# Patient Record
Sex: MALE | Race: WHITE | Employment: OTHER | URBAN - METROPOLITAN AREA
[De-identification: names, ages, dates, MRNs, and addresses within clinical notes are randomized per-mention and may not be internally consistent; named-entity substitution may affect disease eponyms.]

---

## 2017-04-17 PROBLEM — J45.20 MILD INTERMITTENT ASTHMA WITHOUT COMPLICATION: Status: ACTIVE | Noted: 2017-04-17

## 2018-05-04 ENCOUNTER — OFFICE VISIT (OUTPATIENT)
Dept: PRIMARY CARE CLINIC | Age: 73
End: 2018-05-04
Payer: COMMERCIAL

## 2018-05-04 VITALS
BODY MASS INDEX: 27 KG/M2 | HEIGHT: 67 IN | TEMPERATURE: 97.9 F | WEIGHT: 172 LBS | HEART RATE: 64 BPM | OXYGEN SATURATION: 95 % | DIASTOLIC BLOOD PRESSURE: 72 MMHG | SYSTOLIC BLOOD PRESSURE: 138 MMHG

## 2018-05-04 DIAGNOSIS — F41.8 DEPRESSION WITH ANXIETY: ICD-10-CM

## 2018-05-04 DIAGNOSIS — Z00.00 ROUTINE GENERAL MEDICAL EXAMINATION AT A HEALTH CARE FACILITY: Primary | ICD-10-CM

## 2018-05-04 PROCEDURE — G0438 PPPS, INITIAL VISIT: HCPCS | Performed by: FAMILY MEDICINE

## 2018-05-04 RX ORDER — CLONAZEPAM 0.5 MG/1
0.5 TABLET ORAL 3 TIMES DAILY PRN
Qty: 90 TABLET | Refills: 2 | Status: SHIPPED | OUTPATIENT
Start: 2018-05-04 | End: 2018-08-15 | Stop reason: SDUPTHER

## 2018-05-04 ASSESSMENT — LIFESTYLE VARIABLES: HOW OFTEN DO YOU HAVE A DRINK CONTAINING ALCOHOL: 0

## 2018-05-04 ASSESSMENT — PATIENT HEALTH QUESTIONNAIRE - PHQ9
SUM OF ALL RESPONSES TO PHQ QUESTIONS 1-9: 0
SUM OF ALL RESPONSES TO PHQ QUESTIONS 1-9: 2

## 2018-05-04 ASSESSMENT — ANXIETY QUESTIONNAIRES
GAD7 TOTAL SCORE: 1
GAD7 TOTAL SCORE: 0

## 2018-08-15 ENCOUNTER — OFFICE VISIT (OUTPATIENT)
Dept: PRIMARY CARE CLINIC | Age: 73
End: 2018-08-15
Payer: COMMERCIAL

## 2018-08-15 VITALS
WEIGHT: 168 LBS | OXYGEN SATURATION: 98 % | SYSTOLIC BLOOD PRESSURE: 126 MMHG | TEMPERATURE: 98.2 F | HEART RATE: 56 BPM | BODY MASS INDEX: 26.31 KG/M2 | DIASTOLIC BLOOD PRESSURE: 82 MMHG

## 2018-08-15 DIAGNOSIS — F41.8 DEPRESSION WITH ANXIETY: ICD-10-CM

## 2018-08-15 PROCEDURE — 99213 OFFICE O/P EST LOW 20 MIN: CPT | Performed by: FAMILY MEDICINE

## 2018-08-15 RX ORDER — CLONAZEPAM 0.5 MG/1
0.5 TABLET ORAL 3 TIMES DAILY PRN
Qty: 90 TABLET | Refills: 2 | Status: SHIPPED | OUTPATIENT
Start: 2018-08-15 | End: 2018-11-14 | Stop reason: SDUPTHER

## 2018-08-15 NOTE — PROGRESS NOTES
visit:    Depression with anxiety  -     clonazePAM (KLONOPIN) 0.5 MG tablet; Take 1 tablet by mouth 3 times daily as needed for Anxiety for up to 30 days. .      Controlled Substances Monitoring:     RX Monitoring 8/15/2018   Attestation The Prescription Monitoring Report for this patient was reviewed today. Documentation No signs of potential drug abuse or diversion identified. - continue current med and dose  - continue exercise  - [facility]. Return in about 3 months (around 11/15/2018) for or for acute problem.     Pavan Carney, DO

## 2018-11-14 ENCOUNTER — OFFICE VISIT (OUTPATIENT)
Dept: PRIMARY CARE CLINIC | Age: 73
End: 2018-11-14
Payer: COMMERCIAL

## 2018-11-14 VITALS
DIASTOLIC BLOOD PRESSURE: 78 MMHG | TEMPERATURE: 97.4 F | WEIGHT: 169 LBS | HEART RATE: 66 BPM | OXYGEN SATURATION: 95 % | BODY MASS INDEX: 26.47 KG/M2 | SYSTOLIC BLOOD PRESSURE: 122 MMHG

## 2018-11-14 DIAGNOSIS — F41.8 DEPRESSION WITH ANXIETY: ICD-10-CM

## 2018-11-14 PROCEDURE — 99213 OFFICE O/P EST LOW 20 MIN: CPT | Performed by: FAMILY MEDICINE

## 2018-11-14 RX ORDER — CLONAZEPAM 0.5 MG/1
0.5 TABLET ORAL 3 TIMES DAILY PRN
Qty: 90 TABLET | Refills: 2 | Status: SHIPPED | OUTPATIENT
Start: 2018-11-14 | End: 2019-02-11 | Stop reason: SDUPTHER

## 2018-11-14 ASSESSMENT — ENCOUNTER SYMPTOMS: CONSTIPATION: 1

## 2019-02-11 ENCOUNTER — OFFICE VISIT (OUTPATIENT)
Dept: PRIMARY CARE CLINIC | Age: 74
End: 2019-02-11
Payer: COMMERCIAL

## 2019-02-11 VITALS
TEMPERATURE: 96.9 F | OXYGEN SATURATION: 98 % | WEIGHT: 166 LBS | BODY MASS INDEX: 26 KG/M2 | HEART RATE: 67 BPM | DIASTOLIC BLOOD PRESSURE: 64 MMHG | SYSTOLIC BLOOD PRESSURE: 122 MMHG

## 2019-02-11 DIAGNOSIS — Z12.11 COLON CANCER SCREENING: Primary | ICD-10-CM

## 2019-02-11 DIAGNOSIS — F41.8 DEPRESSION WITH ANXIETY: ICD-10-CM

## 2019-02-11 PROCEDURE — 99213 OFFICE O/P EST LOW 20 MIN: CPT | Performed by: FAMILY MEDICINE

## 2019-02-11 RX ORDER — CLONAZEPAM 0.5 MG/1
0.5 TABLET ORAL 3 TIMES DAILY PRN
Qty: 90 TABLET | Refills: 2 | Status: SHIPPED | OUTPATIENT
Start: 2019-02-11 | End: 2019-08-19 | Stop reason: SDUPTHER

## 2019-02-11 ASSESSMENT — PATIENT HEALTH QUESTIONNAIRE - PHQ9
2. FEELING DOWN, DEPRESSED OR HOPELESS: 0
SUM OF ALL RESPONSES TO PHQ QUESTIONS 1-9: 0
1. LITTLE INTEREST OR PLEASURE IN DOING THINGS: 0
SUM OF ALL RESPONSES TO PHQ9 QUESTIONS 1 & 2: 0
SUM OF ALL RESPONSES TO PHQ QUESTIONS 1-9: 0

## 2019-02-11 ASSESSMENT — ENCOUNTER SYMPTOMS: BACK PAIN: 1

## 2019-03-25 DIAGNOSIS — Z12.11 COLON CANCER SCREENING: ICD-10-CM

## 2019-05-13 ENCOUNTER — OFFICE VISIT (OUTPATIENT)
Dept: PRIMARY CARE CLINIC | Age: 74
End: 2019-05-13
Payer: COMMERCIAL

## 2019-05-13 VITALS
TEMPERATURE: 98 F | HEIGHT: 67 IN | DIASTOLIC BLOOD PRESSURE: 76 MMHG | WEIGHT: 153 LBS | BODY MASS INDEX: 24.01 KG/M2 | OXYGEN SATURATION: 96 % | HEART RATE: 68 BPM | SYSTOLIC BLOOD PRESSURE: 118 MMHG

## 2019-05-13 DIAGNOSIS — F41.8 DEPRESSION WITH ANXIETY: ICD-10-CM

## 2019-05-13 DIAGNOSIS — Z00.00 ROUTINE GENERAL MEDICAL EXAMINATION AT A HEALTH CARE FACILITY: Primary | ICD-10-CM

## 2019-05-13 PROCEDURE — G0439 PPPS, SUBSEQ VISIT: HCPCS | Performed by: FAMILY MEDICINE

## 2019-05-13 RX ORDER — CLONAZEPAM 0.5 MG/1
0.5 TABLET ORAL 3 TIMES DAILY PRN
Qty: 90 TABLET | Refills: 2 | Status: SHIPPED | OUTPATIENT
Start: 2019-05-13 | End: 2019-10-14 | Stop reason: SDUPTHER

## 2019-05-13 RX ORDER — CLONAZEPAM 0.5 MG/1
0.5 TABLET ORAL 3 TIMES DAILY PRN
COMMUNITY
End: 2019-05-13 | Stop reason: SDUPTHER

## 2019-05-13 ASSESSMENT — PATIENT HEALTH QUESTIONNAIRE - PHQ9
SUM OF ALL RESPONSES TO PHQ QUESTIONS 1-9: 0
SUM OF ALL RESPONSES TO PHQ QUESTIONS 1-9: 0

## 2019-05-13 ASSESSMENT — ANXIETY QUESTIONNAIRES: GAD7 TOTAL SCORE: 0

## 2019-05-13 ASSESSMENT — ENCOUNTER SYMPTOMS
CONSTIPATION: 1
BLOOD IN STOOL: 0
DIARRHEA: 0

## 2019-05-13 ASSESSMENT — LIFESTYLE VARIABLES: HOW OFTEN DO YOU HAVE A DRINK CONTAINING ALCOHOL: 0

## 2019-05-13 NOTE — PATIENT INSTRUCTIONS
Patient Education        Advance Directives: Care Instructions  Your Care Instructions  An advance directive is a legal way to state your wishes at the end of your life. It tells your family and your doctor what to do if you can no longer say what you want. There are two main types of advance directives. You can change them any time that your wishes change. · A living will tells your family and your doctor your wishes about life support and other treatment. · A durable power of  for health care lets you name a person to make treatment decisions for you when you can't speak for yourself. This person is called a health care agent. If you do not have an advance directive, decisions about your medical care may be made by a doctor or a  who doesn't know you. It may help to think of an advance directive as a gift to the people who care for you. If you have one, they won't have to make tough decisions by themselves. Follow-up care is a key part of your treatment and safety. Be sure to make and go to all appointments, and call your doctor if you are having problems. It's also a good idea to know your test results and keep a list of the medicines you take. How can you care for yourself at home? · Discuss your wishes with your loved ones and your doctor. This way, there are no surprises. · Many states have a unique form. Or you might use a universal form that has been approved by many states. This kind of form can sometimes be completed and stored online. Your electronic copy will then be available wherever you have a connection to the Internet. In most cases, doctors will respect your wishes even if you have a form from a different state. · You don't need a  to do an advance directive. But you may want to get legal advice. · Think about these questions when you prepare an advance directive:  ? Who do you want to make decisions about your medical care if you are not able to?  Many people choose a family member or close friend. ? Do you know enough about life support methods that might be used? If not, talk to your doctor so you understand. ? What are you most afraid of that might happen? You might be afraid of having pain, losing your independence, or being kept alive by machines. ? Where would you prefer to die? Choices include your home, a hospital, or a nursing home. ? Would you like to have information about hospice care to support you and your family? ? Do you want to donate organs when you die? ? Do you want certain Cheondoism practices performed before you die? If so, put your wishes in the advance directive. · Read your advance directive every year, and make changes as needed. When should you call for help? Be sure to contact your doctor if you have any questions. Where can you learn more? Go to https://[email or web address] org and sign in to your [employer] account. Enter R264 in the Dialogic box to learn more about \"Advance Directives: Care Instructions. \"     If you do not have an account, please click on the \"Sign Up Now\" link. Current as of: April 18, 2018  Content Version: 12.0  © 4671-4414 Healthwise, [employer]. Care instructions adapted under license by [facility] ([facility]). If you have questions about a medical condition or this instruction, always ask your healthcare professional. [place] [address] any warranty or liability for your use of this information. Patient Education        Advance Directives: Care Instructions  Your Care Instructions  An advance directive is a legal way to state your wishes at the end of your life. It tells your family and your doctor what to do if you can no longer say what you want. There are two main types of advance directives. You can change them any time that your wishes change. · A living will tells your family and your doctor your wishes about life support and other treatment.   · A durable power of want to donate organs when you die? ? Do you want certain Temple practices performed before you die? If so, put your wishes in the advance directive. · Read your advance directive every year, and make changes as needed. When should you call for help? Be sure to contact your doctor if you have any questions. Where can you learn more? Go to [email or web address] and sign in to your [employer] account. Enter R264 in the ZOOM TV box to learn more about \"Advance Directives: Care Instructions. \"     If you do not have an account, please click on the \"Sign Up Now\" link. Current as of: April 18, 2018  Content Version: 12.0  © 1081-3971 Healthwise, [employer]. Care instructions adapted under license by [facility] ([facility]). If you have questions about a medical condition or this instruction, always ask your healthcare professional. [address] any warranty or liability for your use of this information. Personalized Preventive Plan for Ruth Mueller - 5/13/2019  Medicare offers a range of preventive health benefits. Some of the tests and screenings are paid in full while other may be subject to a deductible, co-insurance, and/or copay. Some of these benefits include a comprehensive review of your medical history including lifestyle, illnesses that may run in your family, and various assessments and screenings as appropriate. After reviewing your medical record and screening and assessments performed today your provider may have ordered immunizations, labs, imaging, and/or referrals for you. A list of these orders (if applicable) as well as your Preventive Care list are included within your After Visit Summary for your review. Other Preventive Recommendations:    · A preventive eye exam performed by an eye specialist is recommended every 1-2 years to screen for glaucoma; cataracts, macular degeneration, and other eye disorders.   · A preventive

## 2019-05-13 NOTE — PROGRESS NOTES
Medicare Annual Wellness Visit subsequent  Name: Fanny Magana Date: 2019   MRN: <[id number]> Sex: Male   Age: 68 y.o. Ethnicity: Non-/Non    : 1945 Race: White      Perry Benz Record is here for [facility] for behavioral, psychosocial and functional/safety risks, and cognitive dysfunction are all negative except as indicated below. These results, as well as other patient data from the [address] form, are documented in Flowsheets linked to this Encounter. No Known Allergies  Prior to Visit Medications    Medication Sig Taking? Authorizing Provider   clonazePAM (KLONOPIN) 0.5 MG tablet Take 0.5 mg by mouth 3 times daily as needed. Yes Historical Provider, MD   fluticasone (FLONASE) 50 MCG/ACT nasal spray 2 sprays by Nasal route daily Yes Jude Carney DO   Misc Natural Products (PROSTATE SUPPORT PO) Take by mouth daily Yes Historical Provider, MD   SYMBICORT 160-4.5 MCG/ACT AERO 2 puffs as needed  Yes Historical Provider, MD   clonazePAM (KLONOPIN) 0.5 MG tablet Take 1 tablet by mouth 3 times daily as needed for Anxiety for up to 30 days. Jun Zarate DO     Past Medical History:   Diagnosis Date    Anxiety     Asthma     Bipolar disorder ([facility] [address]     Depression     Diverticulosis      Past Surgical History:   Procedure Laterality Date    CHOLECYSTECTOMY      COLONOSCOPY  2004    HERNIA REPAIR       No family history on file.     CareTeam (Including outside providers/suppliers regularly involved in providing care):   Patient Care Team:  Mateo Parikh DO as PCP - General  Mateo Parikh DO as PCP - S Attributed Provider    Wt Readings from Last 3 Encounters:   19 153 lb (69.4 kg)   19 166 lb (75.3 kg)   18 169 lb (76.7 kg)     Vitals:    19 0938   BP: 118/76   Pulse: 68   Temp: 98 °F (36.7 °C)   SpO2: 96%   Weight: 153 lb (69.4 kg)   Height: 5' 7\" (1.702 m) Body mass index is 23.96 kg/m². Based upon direct observation of the patient, evaluation of cognition reveals recent and remote memory intact. General Appearance: alert and oriented to person, place and time, well developed and well- nourished, in no acute distress  Skin: warm and dry, no rash or erythema  Head: normocephalic and atraumatic  Eyes: pupils equal, round, and reactive to light, extraocular eye movements intact, conjunctivae normal  ENT: tympanic membrane, external ear and ear canal normal bilaterally, nose without deformity, nasal mucosa and turbinates normal without polyps  Neck: supple and non-tender without mass, no thyromegaly or thyroid nodules, no cervical lymphadenopathy  Pulmonary/Chest: clear to auscultation bilaterally- no wheezes, rales or rhonchi, normal air movement, no respiratory distress  Cardiovascular: normal rate, regular rhythm, normal S1 and S2, no murmurs, rubs, clicks, or gallops, distal pulses intact, no carotid bruits  Abdomen: soft, non-tender, non-distended, normal bowel sounds, no masses or organomegaly  Extremities: no cyanosis, clubbing or edema  Musculoskeletal: normal range of motion, no joint swelling, deformity or tenderness  Neurologic: reflexes normal and symmetric, no cranial nerve deficit, gait, coordination and speech normal    Patient's complete Health Risk Assessment and screening values have been reviewed and are found in Flowsheets. The following problems were reviewed today and where indicated follow up appointments were made and/or referrals ordered. Positive Risk Factor Screenings with Interventions:     Health Habits/Nutrition:  Health Habits/Nutrition  Do you exercise for at least 20 minutes 2-3 times per week?: Yes  Have you lost any weight without trying in the past 3 months?: (!) Yes  Do you eat fewer than 2 meals per day?: (!) Yes  Have you seen a dentist within the past year?: Yes  Body mass index is 23.96 kg/m².   Health Habits/Nutrition Interventions:  · thinks wt loss is due to all his walking, which is 9 miles 3 times a wk. not eating meat and desserts. irecommend eating 3 meals. Safety:  Safety  Do you have working smoke detectors?: Yes  Have all throw rugs been removed or fastened?: (!) No(no throw rugs in house)  Do you have non-slip mats in all bathtubs?: Yes  Do all of your stairways have a railing or banister?: Yes  Are your doorways, halls and stairs free of clutter?: Yes  Do you always fasten your seatbelt when you are in a car?: Yes  Safety Interventions:  · Home safety tips provided    Personalized Preventive Plan   Current Health Maintenance Status  Immunization History   Administered Date(s) Administered    Pneumococcal 13-valent Conjugate ([id number]) 01/26/2018    Pneumococcal Polysaccharide ([id number]) 02/22/2016        Health Maintenance   Topic Date Due    Hepatitis C screen  1945    DTaP/Tdap/Td vaccine (1 - Tdap) 08/17/1964    Shingles Vaccine (1 of 2) 08/17/1995    Flu vaccine (Season Ended) 09/01/2019    Colon cancer screen fecal DNA test (Cologuard)  03/03/2022    Lipid screen  10/01/2023    Pneumococcal 65+ years Vaccine  Completed     Recommendations for Preventive Services Due: see orders and patient instructions/AVS.  .   Recommended screening schedule for the next 5-10 years is provided to the patient in written form: see Patient Instructions/AVS.

## 2019-05-13 NOTE — PROGRESS NOTES
Peterson Arita, a male of 68 y.o. came to the office 5/13/2019. Patient Active Problem List   Diagnosis    Depression with anxiety    Mild intermittent asthma without complication          HPI anxiety/depression: doing well with med. However, he says he forgetting things. No Known Allergies    Current Outpatient Medications on File Prior to Visit   Medication Sig Dispense Refill    fluticasone (FLONASE) 50 MCG/ACT nasal spray 2 sprays by Nasal route daily 1 Bottle 3    Misc Natural Products (PROSTATE SUPPORT PO) Take by mouth daily      SYMBICORT 160-4.5 MCG/ACT AERO 2 puffs as needed       clonazePAM (KLONOPIN) 0.5 MG tablet Take 1 tablet by mouth 3 times daily as needed for Anxiety for up to 30 days. . 90 tablet 2     No current facility-administered medications on file prior to visit. Review of Systems   Gastrointestinal: Positive for constipation. Negative for blood in stool and diarrhea. Psychiatric/Behavioral: Positive for sleep disturbance. Negative for agitation and dysphoric mood. The patient is not nervous/anxious. All other review of systems reviewed and are negative    OBJECTIVE:  /76   Pulse 68   Temp 98 °F (36.7 °C)   Ht 5' 7\" (1.702 m)   Wt 153 lb (69.4 kg)   SpO2 96%   BMI 23.96 kg/m²      Physical Exam   Constitutional: He is oriented to person, place, and time. He appears well-nourished. Eyes: Conjunctivae are normal. No scleral icterus. Neck: Neck supple. Carotid bruit is not present. No thyromegaly present. Cardiovascular: Normal rate and regular rhythm. No murmur heard. Pulmonary/Chest: Effort normal and breath sounds normal. He has no wheezes. He has no rales. Abdominal: Soft. Bowel sounds are normal. He exhibits no mass. There is no tenderness. There is no rebound and no guarding. Musculoskeletal: Normal range of motion. He exhibits no edema. Lymphadenopathy:     He has no cervical adenopathy.    Neurological: He is alert and oriented to person, place, and time. Skin: Skin is warm and dry. Psychiatric: He has a normal mood and affect. Vitals reviewed. ASSESSMENT AND PLAN:    Charlie Anna was seen today for medicare awv. Diagnoses and all orders for this visit:    Routine general medical examination at a health care facility    Depression with anxiety  -     clonazePAM (KLONOPIN) 0.5 MG tablet; Take 1 tablet by mouth 3 times daily as needed for Anxiety for up to 31 days. - encourage 3 meals a day. Get protein in diet, ie start to eat meat. - monitor memory, may need mmse next ov. Controlled Substances Monitoring:     RX Monitoring 5/13/2019   Attestation The Prescription Monitoring Report for this patient was reviewed today. Chronic Pain Routine Monitoring No signs of potential drug abuse or diversion identified: otherwise, see note documentation         Return for Medicare Annual Wellness Visit in 1 year.     Darylene Frizzle Economus,

## 2019-07-02 LAB
AVERAGE GLUCOSE: NORMAL
CHOLESTEROL, TOTAL: 147 MG/DL
CHOLESTEROL/HDL RATIO: NORMAL
HBA1C MFR BLD: 4.8 %
HDLC SERPL-MCNC: 60 MG/DL (ref 35–70)
LDL CHOLESTEROL CALCULATED: 76 MG/DL (ref 0–160)
PROSTATE SPECIFIC ANTIGEN: 13.3 NG/ML
TRIGL SERPL-MCNC: 58 MG/DL
VLDLC SERPL CALC-MCNC: NORMAL MG/DL

## 2019-08-19 ENCOUNTER — OFFICE VISIT (OUTPATIENT)
Dept: PRIMARY CARE CLINIC | Age: 74
End: 2019-08-19
Payer: COMMERCIAL

## 2019-08-19 VITALS
TEMPERATURE: 97.3 F | DIASTOLIC BLOOD PRESSURE: 80 MMHG | OXYGEN SATURATION: 98 % | WEIGHT: 159 LBS | HEART RATE: 66 BPM | BODY MASS INDEX: 24.96 KG/M2 | SYSTOLIC BLOOD PRESSURE: 130 MMHG | HEIGHT: 67 IN

## 2019-08-19 DIAGNOSIS — F41.8 DEPRESSION WITH ANXIETY: ICD-10-CM

## 2019-08-19 DIAGNOSIS — R97.20 ELEVATED PSA, BETWEEN 10 AND LESS THAN 20 NG/ML: Primary | ICD-10-CM

## 2019-08-19 PROCEDURE — 99213 OFFICE O/P EST LOW 20 MIN: CPT | Performed by: FAMILY MEDICINE

## 2019-08-19 RX ORDER — CLONAZEPAM 0.5 MG/1
0.5 TABLET ORAL 3 TIMES DAILY PRN
Qty: 90 TABLET | Refills: 2 | Status: SHIPPED | OUTPATIENT
Start: 2019-08-19 | End: 2019-12-13 | Stop reason: SDUPTHER

## 2019-10-14 DIAGNOSIS — F41.8 DEPRESSION WITH ANXIETY: ICD-10-CM

## 2019-10-14 RX ORDER — CLONAZEPAM 0.5 MG/1
0.5 TABLET ORAL 3 TIMES DAILY PRN
Qty: 90 TABLET | Refills: 0 | Status: SHIPPED | OUTPATIENT
Start: 2019-10-14 | End: 2019-11-20 | Stop reason: ALTCHOICE

## 2019-11-20 ENCOUNTER — OFFICE VISIT (OUTPATIENT)
Dept: PRIMARY CARE CLINIC | Age: 74
End: 2019-11-20
Payer: COMMERCIAL

## 2019-11-20 VITALS
HEART RATE: 72 BPM | OXYGEN SATURATION: 96 % | SYSTOLIC BLOOD PRESSURE: 132 MMHG | DIASTOLIC BLOOD PRESSURE: 76 MMHG | WEIGHT: 161 LBS | BODY MASS INDEX: 25.22 KG/M2 | TEMPERATURE: 97.3 F

## 2019-11-20 DIAGNOSIS — F41.8 DEPRESSION WITH ANXIETY: Primary | ICD-10-CM

## 2019-11-20 DIAGNOSIS — R97.20 ELEVATED PSA, BETWEEN 10 AND LESS THAN 20 NG/ML: ICD-10-CM

## 2019-11-20 PROCEDURE — 99213 OFFICE O/P EST LOW 20 MIN: CPT | Performed by: FAMILY MEDICINE

## 2019-12-13 DIAGNOSIS — F41.8 DEPRESSION WITH ANXIETY: ICD-10-CM

## 2019-12-13 RX ORDER — CLONAZEPAM 0.5 MG/1
0.5 TABLET ORAL 3 TIMES DAILY PRN
Qty: 90 TABLET | Refills: 0 | Status: SHIPPED | OUTPATIENT
Start: 2019-12-13 | End: 2020-01-13 | Stop reason: SDUPTHER

## 2020-01-03 LAB — PROSTATE SPECIFIC ANTIGEN: 20.5 NG/ML

## 2020-01-13 RX ORDER — CLONAZEPAM 0.5 MG/1
0.5 TABLET ORAL 3 TIMES DAILY PRN
Qty: 90 TABLET | Refills: 2 | Status: SHIPPED
Start: 2020-01-13 | End: 2020-04-14

## 2020-03-09 ENCOUNTER — OFFICE VISIT (OUTPATIENT)
Dept: PRIMARY CARE CLINIC | Age: 75
End: 2020-03-09
Payer: MEDICARE

## 2020-03-09 VITALS
SYSTOLIC BLOOD PRESSURE: 132 MMHG | OXYGEN SATURATION: 96 % | HEART RATE: 52 BPM | DIASTOLIC BLOOD PRESSURE: 64 MMHG | BODY MASS INDEX: 25.84 KG/M2 | WEIGHT: 165 LBS | TEMPERATURE: 97.1 F

## 2020-03-09 PROCEDURE — 99213 OFFICE O/P EST LOW 20 MIN: CPT | Performed by: FAMILY MEDICINE

## 2020-03-09 ASSESSMENT — ENCOUNTER SYMPTOMS
DIFFICULTY BREATHING: 0
CHEST TIGHTNESS: 0
WHEEZING: 0
COUGH: 0
SHORTNESS OF BREATH: 0

## 2020-04-14 RX ORDER — CLONAZEPAM 0.5 MG/1
TABLET ORAL
Qty: 90 TABLET | Refills: 0 | Status: SHIPPED
Start: 2020-04-14 | End: 2020-05-11 | Stop reason: SDUPTHER

## 2020-05-07 ENCOUNTER — HOSPITAL ENCOUNTER (OUTPATIENT)
Age: 75
Discharge: HOME | End: 2020-05-09

## 2020-05-07 PROCEDURE — 88305 TISSUE EXAM BY PATHOLOGIST: CPT

## 2020-05-07 PROCEDURE — 88342 IMHCHEM/IMCYTCHM 1ST ANTB: CPT

## 2020-05-11 RX ORDER — CLONAZEPAM 0.5 MG/1
TABLET ORAL
Qty: 90 TABLET | Refills: 0 | Status: SHIPPED
Start: 2020-05-11 | End: 2020-06-08 | Stop reason: SDUPTHER

## 2020-06-04 ENCOUNTER — HOSPITAL ENCOUNTER (OUTPATIENT)
Age: 75
Discharge: HOME | End: 2020-06-06
Payer: MEDICARE

## 2020-06-04 ENCOUNTER — HOSPITAL ENCOUNTER (OUTPATIENT)
Dept: GENERAL RADIOLOGY | Age: 75
Discharge: HOME | End: 2020-06-06
Payer: MEDICARE

## 2020-06-04 PROCEDURE — 72100 X-RAY EXAM L-S SPINE 2/3 VWS: CPT

## 2020-06-08 RX ORDER — CLONAZEPAM 0.5 MG/1
TABLET ORAL
Qty: 270 TABLET | Refills: 0 | Status: SHIPPED
Start: 2020-06-08 | End: 2020-10-22 | Stop reason: SDUPTHER

## 2020-06-15 ENCOUNTER — TELEPHONE (OUTPATIENT)
Dept: CASE MANAGEMENT | Age: 75
End: 2020-06-15

## 2020-06-15 ENCOUNTER — HOSPITAL ENCOUNTER (OUTPATIENT)
Dept: RADIATION ONCOLOGY | Age: 75
Discharge: HOME | End: 2020-06-15
Payer: OTHER GOVERNMENT

## 2020-06-15 VITALS
WEIGHT: 166.7 LBS | DIASTOLIC BLOOD PRESSURE: 80 MMHG | BODY MASS INDEX: 26.11 KG/M2 | RESPIRATION RATE: 18 BRPM | HEART RATE: 66 BPM | TEMPERATURE: 97.6 F | SYSTOLIC BLOOD PRESSURE: 134 MMHG | OXYGEN SATURATION: 96 %

## 2020-06-15 PROCEDURE — 99205 OFFICE O/P NEW HI 60 MIN: CPT

## 2020-06-15 PROCEDURE — 99205 OFFICE O/P NEW HI 60 MIN: CPT | Performed by: RADIOLOGY

## 2020-06-15 RX ORDER — BUPROPION HYDROCHLORIDE 75 MG/1
450 TABLET ORAL DAILY
COMMUNITY
End: 2022-02-02 | Stop reason: ALTCHOICE

## 2020-06-15 RX ORDER — LAMOTRIGINE 150 MG/1
150 TABLET ORAL DAILY
COMMUNITY
End: 2022-02-02 | Stop reason: ALTCHOICE

## 2020-06-15 RX ORDER — OLANZAPINE 5 MG/1
5 TABLET, ORALLY DISINTEGRATING ORAL NIGHTLY
COMMUNITY
End: 2021-08-26 | Stop reason: ALTCHOICE

## 2020-06-15 NOTE — PROGRESS NOTES
patient/family/caregiver on falls prevention  6.  Reassess in 12 weeks or with any noted change in patient condition which places them at a risk for a fall   4-6 Moderate Risk 1. Provide assistance as indicated for ambulation activities  2. Reorient confused/cognitively impaired patient  3. Call-light/bell within patient's reach  4. Chair/bed in low position, stretcher/bed with siderails up except when performing patient care activities  5. Educate patient/family/caregiver on falls prevention  6. Falls risk precaution (Yellow sticker Level II) placed on patient chart   7 or   Higher High Risk 1. Place patient in easily observable treatment room  2. Patient attended at all times by family member or staff  3. Provide assistance as indicated for ambulation activities  4. Reorient confused/cognitively impaired patient  5. Call-light/bell within patient's reach  6. Chair/bed in low position, stretcher/bed with siderails up except when performing patient care activities  7. Educate patient/family/caregiver on falls prevention  8. Falls risk precaution (Yellow sticker Level III) placed on patient chart           MALNUTRITION RISK SCREENING ASSESSMENT    Instructions:  Assess the patient and enter the appropriate indicators that are present for nutrition risk identification. Total the numbers entered and assign a risk score. Follow the appropriate action for total score listed below. Assessment   Date  6/15/2020     1. Have you lost weight without trying? 0- No     2. Have you been eating poorly because of a decreased appetite? 0- No   3. Do you have a diagnosis of head and neck cancer?       0- No                                                                                    TOTAL 0          Score of 0-1: No action  Score 2 or greater:  · For Non-Diabetic Patient: Recommend adding Ensure Complete 2 x daily and provide patient with Ensure wellness bag with coupons  · For Diabetic Patient: Recommend adding Glucerna Shake 2 x daily and provide patient with Glucerna Wellness bag with coupons  · Route to the dietitian via [id number]    · Are you having  difficulty performing daily routine tasks  due to fatigue or weakness (ie: bathing/showering, dressing, housework, meal prep, work, child Ry Emily): No     · Do you have any arm flexibility/ROM restrictions, swelling or pain that limit activity: No     · Any changes in memory, attention/focus that impact daily activities: No     · Do you avoid participation in leisure/social activity due weakness, fatigue or pain: No     ARE ANY OF THE ABOVE ARE ANSWERED YES: No          PT ASSESSMENT FOR REFERRAL    · Have you had any recent falls in past 2 months: No     · Do you have difficulty  going up/down stairs: No     · Are you having difficulty walking: No     · Do you often hold onto furniture/environmental supports or feel off balance when you are walking: No     · Do you need to take rest breaks when you are walking: No     · Any pain on scale of 1-10 that limits your mobility: No 0/10    ARE ANY OF THE ABOVE ARE ANSWERED YES: No           LYMPHEDEMA SCREENING ASSESSMENT FOR PATIENTS WITH BREAST CANCER    The patient reports the following signs/symptoms of lymphedema: None    Please ask the provider to assess patient for lymphedema for any reported signs or symptoms so a referral to Lymphedema Therapy can be considered. PREHAB AUDIOLOGY REFERRAL    - Is patient planned to receive Cisplatin? No. This patient is not planned to start Cisplatin. - Is patient planned to receive radiation therapy that may be directed toward auditory canals or nerves? No. Patient is not planned to start radiation therapy to auditory canals or nerves. - Is patient complaining of new onset hearing loss? No. Patient is not complaining of new onset hearing loss. Patient education given on radiation therapy to the pelvis/prostate bed.

## 2020-06-15 NOTE — PROGRESS NOTES
Prostate right lateral base, needle biopsy: Benign prostatic tissue  D.  Prostate right medial base, needle biopsy: Benign prostatic tissue  E.  Prostate right lateral mid, needle biopsy: Benign prostatic tissue  F.  Prostate right medial mid, needle biopsy: Benign prostatic tissue  G.  Prostate right lateral apex, needle biopsy: Benign prostatic tissue  H.  Prostate right medial apex, needle biopsy: Benign prostatic tissue  I.  Prostate left lateral base, needle biopsy: Benign prostatic tissue  J.  Prostate left medial base, needle biopsy: Benign prostatic and  seminal vesicle tissue  K.  Prostate left lateral mid, needle biopsy: Benign prostatic tissue  L.  Prostate left medial mid, needle biopsy: Benign prostatic tissue  M.  Prostate left lateral apex, needle biopsy: Benign prostatic tissue  N.  Prostate left medial apex, needle biopsy: Prostatic tissue  Comment:     In part B, focal glands show cyto-architectural atypia;  cytokeratin 903 stain is equivocal for the presence of basal cells in  these foci.  Intradepartmental consultation is obtained.         -----        Past Medical History:   Diagnosis Date    Anxiety     Asthma     Bipolar disorder ([facility] [address]     Depression     Diverticulosis 12/04       Past Surgical History:   Procedure Laterality Date    CHOLECYSTECTOMY      COLONOSCOPY  12/21/2004    HERNIA REPAIR         Family History   Problem Relation Age of Onset    Cancer Mother 72        bone       Current Outpatient Medications   Medication Sig Dispense Refill    buPROPion (WELLBUTRIN) 75 MG tablet Take 150 mg by mouth 2 times daily      OLANZapine zydis (ZYPREXA) 5 MG disintegrating tablet Take 5 mg by mouth nightly      BUDESONIDE ER PO Take 4.5 mcg by mouth      lamoTRIgine (LAMICTAL) 150 MG tablet Take 150 mg by mouth daily      clonazePAM (KLONOPIN) 0.5 MG tablet TAKE 1 TABLET BY MOUTH THREE TIMES DAILY AS NEEDED FOR ANXIETY 270 tablet 0    fluticasone (FLONASE) 50 MCG/ACT nasal spray 2 sprays by Nasal route daily 1 Bottle 3    Misc Natural Products (PROSTATE SUPPORT PO) Take by mouth daily      SYMBICORT 160-4.5 MCG/ACT AERO 2 puffs as needed        No current facility-administered medications for this encounter.         No Known Allergies      Social History     Socioeconomic History    Marital status:      Spouse name: None    Number of children: None    Years of education: None    Highest education level: None   Occupational History    None   Social Needs    Financial resource strain: None    Food insecurity     Worry: None     Inability: None    Transportation needs     Medical: None     Non-medical: None   Tobacco Use    Smoking status: Never Smoker    Smokeless tobacco: Never Used   Substance and Sexual Activity    Alcohol use: Yes     Comment: 1 glass every evening since diagnosis    Drug use: No    Sexual activity: None   Lifestyle    Physical activity     Days per week: None     Minutes per session: None    Stress: None   Relationships    Social connections     Talks on phone: None     Gets together: None     Attends Congregational service: None     Active member of club or organization: None     Attends meetings of clubs or organizations: None     Relationship status: None    Intimate partner violence     Fear of current or ex partner: None     Emotionally abused: None     Physically abused: None     Forced sexual activity: None   Other Topics Concern    None   Social History Narrative    None          Review of Systems - History obtained from chart review and the patient  General ROS: negative  Psychological ROS: negative  Ophthalmic ROS: negative  ENT ROS: negative  Allergy and Immunology ROS: negative  Hematological and Lymphatic ROS: negative  Endocrine ROS: negative  Breast ROS: negative for breast lumps  Respiratory ROS: no cough, shortness of breath, or wheezing  Cardiovascular ROS: no chest pain or dyspnea on exertion  Gastrointestinal ROS: no abdominal pain, change in bowel habits, or black or bloody stools  Genito-Urinary ROS: no dysuria, trouble voiding, or hematuria  Musculoskeletal ROS: negative  Neurological ROS: no TIA or stroke symptoms  Dermatological ROS: negative        Physical Exam  HENT:      Head: Normocephalic and atraumatic. Right Ear: External ear normal.      Left Ear: External ear normal.      Nose: Nose normal.      Mouth/Throat:      Mouth: Mucous membranes are moist.   Eyes:      Pupils: Pupils are equal, round, and reactive to light. Neck:      Musculoskeletal: Normal range of motion. Cardiovascular:      Pulses: Normal pulses. Pulmonary:      Effort: Pulmonary effort is normal.   Abdominal:      General: Abdomen is flat. Palpations: Abdomen is soft. Musculoskeletal: Normal range of motion. Skin:     General: Skin is warm and dry. Neurological:      General: No focal deficit present. Mental Status: He is alert and oriented to person, place, and time. Psychiatric:         Mood and Affect: Mood normal.         Behavior: Behavior normal.         Thought Content: Thought content normal.         Judgment: Judgment normal.           Imaging reviewed:        MRI pelvis 2/4/20 --- also see Media tab.         Radiation Safety and Treatment Support:  -previous Radiation history: No  -history of connective tissue disease: No  -history of autoimmune disease: No  -pregnant: no  -fertility conservation and /or contraception discussed: no  -nutrition consult prior to [facility] janet: Yes  -PEG: No  -Dental evaluation prior to treatment:No  -Social Work requested: Yes  -Oncology Nurse Navigator requested: Yes  -pre + post treatment PT / Rehab / PM+R evaluation considered: Yes  -ICD: No   -ICD brand: -  -[facility] patient navigator: Howard More  -Nurse Practitioners for Radiation Oncology:    ---Loly Mack, MSN, RN, FNP-C   ---Marcelina So, MSN, RN, FNP-BC        Assessment and Plan: Candis Nicole is a pleasant and cooperative 76year old with a recent diagnosis of high risk prostate cancer. 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 He has a life expectancy > 10 years and desires definitve management. Today, we discussed the treatment paradigm of prostate cancer in general including options such as surgery, primary androgen deprivation therapy,  observation (active surveillance), cryotherapy, HIFU, brachytherapy, combination brachytherapy-EBRT, HDR radiotherapy, and external beam radiation therapy (using IMRT with daily image guidance, either with CBCT or [place] radiofrequency fiducial beacons). Included in this discussion was a brief overview of each. Our discussion focused on IG-IMRT and detailed the risks, benefits, and alternatives of this component of definitive management. Specifically included were the acute risks of ED, fatigue, loose stools, hematuria, hematochezia and urinary frequency changes. There is also a small risk of skin changes with IMRT and hair loss. Chronic effects that were specifically mentioned include but are not limited to: second malignancy, proctitis, enteritis, incontinence (and retention), hematuria, dysuria, frequency, erectile dysfunction, and small bowl obstruction (we also noted that there can be no guarantee of cure or a specific disease free interval). The patient verbalized an understanding of these items. Included in this conversation was a description of side effect rates noting that grade 2-3 late complications are less common with modern techniques. It was a pleasure meeting Rich today and we appreciate the referral and opportunity to be involved in his care. We had an extensive discussion today regarding the course to date (including a focused review of theapplicable radiographic and laboratory information), multidisciplinary approach to cancer care, and indications for external beam radiation therapy as a component therein.  A literature review and

## 2020-06-19 ENCOUNTER — TELEPHONE (OUTPATIENT)
Dept: RADIATION ONCOLOGY | Age: 75
End: 2020-06-19

## 2020-07-10 ENCOUNTER — TELEPHONE (OUTPATIENT)
Dept: RADIATION ONCOLOGY | Age: 75
End: 2020-07-10

## 2020-07-10 NOTE — TELEPHONE ENCOUNTER
RN placed call to patient's wife regarding appointment for CT Sim-patient to arrive at Raisa Tello's office on 7/29/2020 at 10 am. Instructions for patient arriving with a full bladder again given-RN took a moment to answer all of the patient's wife's questions to her satisfaction.

## 2020-07-14 ENCOUNTER — TELEPHONE (OUTPATIENT)
Dept: RADIATION ONCOLOGY | Age: 75
End: 2020-07-14

## 2020-07-14 NOTE — TELEPHONE ENCOUNTER
Reached out to patient based off of a referral from Susa Dubin, RN. Spoke with patient's wife who informed me that they did get approval through the [place] and all treatments have been covered so they have no other questions/concerns at this time.  Electronically signed by Moses Donahue on 7/14/2020 at 1:40 PM

## 2020-07-29 ENCOUNTER — HOSPITAL ENCOUNTER (OUTPATIENT)
Dept: RADIATION ONCOLOGY | Age: 75
Discharge: HOME | End: 2020-07-29
Attending: RADIOLOGY
Payer: OTHER GOVERNMENT

## 2020-07-29 PROCEDURE — 77334 RADIATION TREATMENT AID(S): CPT | Performed by: RADIOLOGY

## 2020-07-29 PROCEDURE — 77263 THER RADIOLOGY TX PLNG CPLX: CPT | Performed by: RADIOLOGY

## 2020-07-31 ENCOUNTER — OFFICE VISIT (OUTPATIENT)
Dept: PRIMARY CARE CLINIC | Age: 75
End: 2020-07-31
Payer: MEDICARE

## 2020-07-31 VITALS
OXYGEN SATURATION: 95 % | DIASTOLIC BLOOD PRESSURE: 80 MMHG | HEIGHT: 67 IN | SYSTOLIC BLOOD PRESSURE: 134 MMHG | WEIGHT: 166 LBS | BODY MASS INDEX: 26.06 KG/M2 | TEMPERATURE: 97.2 F | HEART RATE: 60 BPM

## 2020-07-31 PROCEDURE — G0439 PPPS, SUBSEQ VISIT: HCPCS | Performed by: FAMILY MEDICINE

## 2020-07-31 ASSESSMENT — LIFESTYLE VARIABLES
HAS A RELATIVE, FRIEND, DOCTOR, OR ANOTHER HEALTH PROFESSIONAL EXPRESSED CONCERN ABOUT YOUR DRINKING OR SUGGESTED YOU CUT DOWN: 0
AUDIT-C TOTAL SCORE: 3
AUDIT TOTAL SCORE: 3
HOW MANY STANDARD DRINKS CONTAINING ALCOHOL DO YOU HAVE ON A TYPICAL DAY: 0
HOW OFTEN DURING THE LAST YEAR HAVE YOU FAILED TO DO WHAT WAS NORMALLY EXPECTED FROM YOU BECAUSE OF DRINKING: 0
HOW OFTEN DURING THE LAST YEAR HAVE YOU BEEN UNABLE TO REMEMBER WHAT HAPPENED THE NIGHT BEFORE BECAUSE YOU HAD BEEN DRINKING: 0
HOW OFTEN DO YOU HAVE A DRINK CONTAINING ALCOHOL: 3
HOW OFTEN DURING THE LAST YEAR HAVE YOU FOUND THAT YOU WERE NOT ABLE TO STOP DRINKING ONCE YOU HAD STARTED: 0
HAVE YOU OR SOMEONE ELSE BEEN INJURED AS A RESULT OF YOUR DRINKING: 0
HOW OFTEN DO YOU HAVE SIX OR MORE DRINKS ON ONE OCCASION: 0
HOW OFTEN DURING THE LAST YEAR HAVE YOU NEEDED AN ALCOHOLIC DRINK FIRST THING IN THE MORNING TO GET YOURSELF GOING AFTER A NIGHT OF HEAVY DRINKING: 0
HOW OFTEN DURING THE LAST YEAR HAVE YOU HAD A FEELING OF GUILT OR REMORSE AFTER DRINKING: 0

## 2020-07-31 ASSESSMENT — PATIENT HEALTH QUESTIONNAIRE - PHQ9
SUM OF ALL RESPONSES TO PHQ QUESTIONS 1-9: 0
SUM OF ALL RESPONSES TO PHQ QUESTIONS 1-9: 0

## 2020-07-31 NOTE — PROGRESS NOTES
Russ Valenzuela, a male of 76 y.o. came to the office 7/31/2020. Patient Active Problem List   Diagnosis    Depression with anxiety    Mild intermittent asthma without complication          HPI depression: moods are stable with meds. He is active. Recently diagnosed with Prostate Cancer and is going to start radiation treatments and is ok with this. No Known Allergies    Current Outpatient Medications on File Prior to Visit   Medication Sig Dispense Refill    buPROPion (WELLBUTRIN) 75 MG tablet Take 150 mg by mouth 2 times daily      OLANZapine zydis (ZYPREXA) 5 MG disintegrating tablet Take 5 mg by mouth nightly      BUDESONIDE ER PO Take 4.5 mcg by mouth      lamoTRIgine (LAMICTAL) 150 MG tablet Take 150 mg by mouth daily      clonazePAM (KLONOPIN) 0.5 MG tablet TAKE 1 TABLET BY MOUTH THREE TIMES DAILY AS NEEDED FOR ANXIETY 270 tablet 0    fluticasone (FLONASE) 50 MCG/ACT nasal spray 2 sprays by Nasal route daily 1 Bottle 3    Misc Natural Products (PROSTATE SUPPORT PO) Take by mouth daily      SYMBICORT 160-4.5 MCG/ACT AERO 2 puffs as needed        No current facility-administered medications on file prior to visit. Review of Systems   Constitutional: Negative for activity change and appetite change. Psychiatric/Behavioral: Positive for dysphoric mood and sleep disturbance. Negative for agitation and decreased concentration. The patient is not nervous/anxious. other review of systems reviewed and are negative    OBJECTIVE:  /80   Pulse 60   Temp 97.2 °F (36.2 °C)   Ht 5' 7\" (1.702 m)   Wt 166 lb (75.3 kg)   SpO2 95%   BMI 26.00 kg/m²      Physical Exam  Vitals signs reviewed. HENT:      Right Ear: Tympanic membrane normal.      Left Ear: Tympanic membrane normal.      Nose: No mucosal edema or rhinorrhea. Right Sinus: No maxillary sinus tenderness or frontal sinus tenderness. Left Sinus: No maxillary sinus tenderness or frontal sinus tenderness. Mouth/Throat:      Pharynx: No oropharyngeal exudate or posterior oropharyngeal erythema. Eyes:      General: No scleral icterus. Conjunctiva/sclera: Conjunctivae normal.   Neck:      Musculoskeletal: Neck supple. Thyroid: No thyromegaly. Vascular: No carotid bruit. Cardiovascular:      Rate and Rhythm: Normal rate and regular rhythm. Heart sounds: No murmur. Pulmonary:      Effort: Pulmonary effort is normal.      Breath sounds: Normal breath sounds. No wheezing or rales. Abdominal:      General: Bowel sounds are normal.      Palpations: Abdomen is soft. There is no mass. Tenderness: There is no abdominal tenderness. There is no guarding or rebound. Musculoskeletal: Normal range of motion. Lymphadenopathy:      Cervical: No cervical adenopathy. Skin:     General: Skin is warm and dry. Neurological:      Mental Status: He is alert and oriented to person, place, and time. Psychiatric:         Mood and Affect: Mood normal.         ASSESSMENT AND PLAN:    Chace Inman was seen today for medicare awv. Diagnoses and all orders for this visit:    Routine general medical examination at a health care facility    Depression with anxiety    - continue current meds   - start Radiation treatment per rad/onc. Return in 3 months (on 10/31/2020) for Medicare Annual Wellness Visit in 1 year, or for acute problem.     Quincy Carney,

## 2020-07-31 NOTE — PATIENT INSTRUCTIONS
Personalized Preventive Plan for Glenroy Rodriguez - 7/31/2020  Medicare offers a range of preventive health benefits. Some of the tests and screenings are paid in full while other may be subject to a deductible, co-insurance, and/or copay. Some of these benefits include a comprehensive review of your medical history including lifestyle, illnesses that may run in your family, and various assessments and screenings as appropriate. After reviewing your medical record and screening and assessments performed today your provider may have ordered immunizations, labs, imaging, and/or referrals for you. A list of these orders (if applicable) as well as your Preventive Care list are included within your After Visit Summary for your review. Other Preventive Recommendations:    · A preventive eye exam performed by an eye specialist is recommended every 1-2 years to screen for glaucoma; cataracts, macular degeneration, and other eye disorders. · A preventive dental visit is recommended every 6 months. · Try to get at least 150 minutes of exercise per week or 10,000 steps per day on a pedometer . · Order or download the FREE \"Exercise & Physical Activity: Your Everyday Guide\" from [employer]. Call 0-289.406.5631 or search [employer] online. · You need 7612-0284 mg of calcium and 2727-7866 IU of vitamin D per day. It is possible to meet your calcium requirement with diet alone, but a vitamin D supplement is usually necessary to meet this goal.  · When exposed to the sun, use a sunscreen that protects against both UVA and UVB radiation with an SPF of 30 or greater. Reapply every 2 to 3 hours or after sweating, drying off with a towel, or swimming. · Always wear a seat belt when traveling in a car. Always wear a helmet when riding a bicycle or motorcycle.

## 2020-07-31 NOTE — PROGRESS NOTES
Medicare Annual Wellness Visit  Name: Jayson Bryson Date: 2020   MRN: [id number] Sex: Male   Age: 76 y.o. Ethnicity: Non-/Non    : 1945 Race: White      Perry JAZZ Miller is here for [employer] for behavioral, psychosocial and functional/safety risks, and cognitive dysfunction are all negative except as indicated below. These results, as well as other patient data from the [address] form, are documented in Flowsheets linked to this Encounter. No Known Allergies    Prior to Visit Medications    Medication Sig Taking?  Authorizing Provider   buPROPion (WELLBUTRIN) 75 MG tablet Take 150 mg by mouth 2 times daily Yes Historical Provider, MD   OLANZapine zydis (ZYPREXA) 5 MG disintegrating tablet Take 5 mg by mouth nightly Yes Historical Provider, MD   BUDESONIDE ER PO Take 4.5 mcg by mouth Yes Historical Provider, MD   lamoTRIgine (LAMICTAL) 150 MG tablet Take 150 mg by mouth daily Yes Historical Provider, MD   clonazePAM (KLONOPIN) 0.5 MG tablet TAKE 1 TABLET BY MOUTH THREE TIMES DAILY AS NEEDED FOR ANXIETY Yes Jude Carney,    fluticasone (FLONASE) 50 MCG/ACT nasal spray 2 sprays by Nasal route daily Yes Flora Carney DO   Misc Natural Products (PROSTATE SUPPORT PO) Take by mouth daily Yes Historical Provider, MD   SYMBICORT 160-4.5 MCG/ACT AERO 2 puffs as needed  Yes Historical Provider, MD       Past Medical History:   Diagnosis Date    Anxiety     Asthma     Bipolar disorder ([facility] [address]     Depression     Diverticulosis        Past Surgical History:   Procedure Laterality Date    CHOLECYSTECTOMY      COLONOSCOPY  2004    HERNIA REPAIR         Family History   Problem Relation Age of Onset    Cancer Mother 72        bone       CareTeam (Including outside providers/suppliers regularly involved in providing care):   Patient Care Team:  Rose Snow DO as PCP - General  Flora Carney DO as PCP - [facility] Empaneled Provider  Don Felty, RN as Nurse Navigator (Oncology)    Wt Readings from Last 3 Encounters:   07/31/20 166 lb (75.3 kg)   06/15/20 166 lb 11.2 oz (75.6 kg)   03/09/20 165 lb (74.8 kg)     Vitals:    07/31/20 1347   BP: 134/80   Pulse: 60   Temp: 97.2 °F (36.2 °C)   SpO2: 95%   Weight: 166 lb (75.3 kg)   Height: 5' 7\" (1.702 m)     Body mass index is 26 kg/m². Based upon direct observation of the patient, evaluation of cognition reveals recent and remote memory intact. General Appearance: alert and oriented to person, place and time, well developed and well- nourished, in no acute distress  Skin: warm and dry, no rash or erythema  Head: normocephalic and atraumatic  Eyes: pupils equal, round, and reactive to light, extraocular eye movements intact, conjunctivae normal  ENT: tympanic membrane, external ear and ear canal normal bilaterally, nose without deformity, nasal mucosa and turbinates normal without polyps  Neck: supple and non-tender without mass, no thyromegaly or thyroid nodules, no cervical lymphadenopathy  Pulmonary/Chest: clear to auscultation bilaterally- no wheezes, rales or rhonchi, normal air movement, no respiratory distress  Cardiovascular: normal rate, regular rhythm, normal S1 and S2, no murmurs, rubs, clicks, or gallops, distal pulses intact, no carotid bruits  Abdomen: soft, non-tender, non-distended, normal bowel sounds, no masses or organomegaly  Extremities: no cyanosis, clubbing or edema  Musculoskeletal: normal range of motion, no joint swelling, deformity or tenderness  Neurologic: reflexes normal and symmetric, no cranial nerve deficit, gait, coordination and speech normal    Patient's complete Health Risk Assessment and screening values have been reviewed and are found in Flowsheets. The following problems were reviewed today and where indicated follow up appointments were made and/or referrals ordered.     Positive Risk Factor Screenings with Interventions:     Safety:  Safety  Do you have working smoke detectors?: Yes  Have all throw rugs been removed or fastened?: Yes  Do you have non-slip mats or surfaces in all bathtubs/showers?: (!) No  Do all of your stairways have a railing or banister?: Yes  Are your doorways, halls and stairs free of clutter?: Yes  Do you always fasten your seatbelt when you are in a car?: Yes  Safety Interventions:  · recommend non-slip mats for shower. Personalized Preventive Plan   Current Health Maintenance Status  Immunization History   Administered Date(s) Administered    Pneumococcal Conjugate 13-valent ([id number]) 01/26/2018    Pneumococcal Polysaccharide ([id number]) 02/22/2016        Health Maintenance   Topic Date Due    Hepatitis C screen  1945    DTaP/Tdap/Td vaccine (1 - Tdap) 08/17/1964    Shingles Vaccine (1 of 2) 08/17/1995    Annual Wellness Visit (AWV)  05/29/2019    Flu vaccine (1) 09/01/2020    PSA counseling  01/03/2021    Colon cancer screen fecal DNA test (Cologuard)  03/03/2022    Lipid screen  07/02/2024    Pneumococcal 65+ years Vaccine  Completed    Hepatitis A vaccine  Aged Out    Hepatitis B vaccine  Aged Out    Hib vaccine  Aged Out    Meningococcal (ACWY) vaccine  Aged Out     Recommendations for [employer] Due: see orders and patient instructions/AVS.  . Recommended screening schedule for the next 5-10 years is provided to the patient in written form: see Patient Instructions/AVS.    There are no diagnoses linked to this encounter.

## 2020-08-20 ENCOUNTER — HOSPITAL ENCOUNTER (OUTPATIENT)
Dept: RADIATION ONCOLOGY | Age: 75
Discharge: HOME | End: 2020-08-20
Attending: RADIOLOGY
Payer: OTHER GOVERNMENT

## 2020-08-20 PROCEDURE — 77300 RADIATION THERAPY DOSE PLAN: CPT | Performed by: RADIOLOGY

## 2020-08-20 PROCEDURE — 77338 DESIGN MLC DEVICE FOR IMRT: CPT | Performed by: RADIOLOGY

## 2020-08-20 PROCEDURE — 77301 RADIOTHERAPY DOSE PLAN IMRT: CPT | Performed by: RADIOLOGY

## 2020-08-24 ENCOUNTER — HOSPITAL ENCOUNTER (OUTPATIENT)
Dept: RADIATION ONCOLOGY | Age: 75
Discharge: HOME | End: 2020-08-24
Attending: RADIOLOGY
Payer: OTHER GOVERNMENT

## 2020-08-24 PROCEDURE — 77014 PR CT GUIDANCE PLACEMENT RAD THERAPY FIELDS: CPT | Performed by: RADIOLOGY

## 2020-08-24 PROCEDURE — 77385 HC NTSTY MODUL RAD TX DLVR SMPL: CPT | Performed by: RADIOLOGY

## 2020-08-25 ENCOUNTER — HOSPITAL ENCOUNTER (OUTPATIENT)
Dept: RADIATION ONCOLOGY | Age: 75
Discharge: HOME | End: 2020-08-25
Attending: RADIOLOGY
Payer: OTHER GOVERNMENT

## 2020-08-25 PROCEDURE — 77385 HC NTSTY MODUL RAD TX DLVR SMPL: CPT | Performed by: RADIOLOGY

## 2020-08-25 PROCEDURE — 77014 PR CT GUIDANCE PLACEMENT RAD THERAPY FIELDS: CPT | Performed by: RADIOLOGY

## 2020-08-26 ENCOUNTER — HOSPITAL ENCOUNTER (OUTPATIENT)
Dept: RADIATION ONCOLOGY | Age: 75
Discharge: HOME | End: 2020-08-26
Attending: RADIOLOGY
Payer: OTHER GOVERNMENT

## 2020-08-26 VITALS
SYSTOLIC BLOOD PRESSURE: 134 MMHG | RESPIRATION RATE: 18 BRPM | WEIGHT: 168 LBS | DIASTOLIC BLOOD PRESSURE: 68 MMHG | HEART RATE: 56 BPM | OXYGEN SATURATION: 98 % | TEMPERATURE: 97.8 F | BODY MASS INDEX: 26.31 KG/M2

## 2020-08-26 PROCEDURE — 77385 HC NTSTY MODUL RAD TX DLVR SMPL: CPT | Performed by: RADIOLOGY

## 2020-08-26 PROCEDURE — 99999 PR OFFICE/OUTPT VISIT,PROCEDURE ONLY: CPT | Performed by: RADIOLOGY

## 2020-08-26 PROCEDURE — 77014 PR CT GUIDANCE PLACEMENT RAD THERAPY FIELDS: CPT | Performed by: RADIOLOGY

## 2020-08-26 NOTE — PROGRESS NOTES
Perry Wilder  8/26/2020  Wt Readings from Last 3 Encounters:   07/31/20 166 lb (75.3 kg)   06/15/20 166 lb 11.2 oz (75.6 kg)   03/09/20 165 lb (74.8 kg)     There is no height or weight on file to calculate BMI. Treatment Area:pelvis + Pros/Prostate +Prox SV    Patient was seen today for weekly visit. Comfort Alteration  KPS:90%  Fatigue: None    Nutritional Alteration  Anorexia: No  Nausea: No  Vomiting: No     Elimination Alterations  Constipation: no  Diarrhea:  no  Urinary Frequency/Urgency: No  Urinary Retention: No  Dysuria: No  Urinary Incontinence: No  Proctitis: No  Nocturia: Yes #/night: 1     Skin Alteration   Sensation:na    Radiation Dermatitis:  na    Emotional  Coping: effective    Sexuality Alteration  na    Injury, potential bleeding or infection: na    Lab Results   Component Value Date    WBC 7.1 10/21/2014     10/21/2014         Temp 97.8 °F (36.6 °C) (Temporal)   Resp 18   BP within normal range? yes         Assessment/Plan: patient has completed 3/44 fractions, 540cGy/5040.     Celso Orellana

## 2020-08-27 ENCOUNTER — TELEPHONE (OUTPATIENT)
Dept: RADIATION ONCOLOGY | Age: 75
End: 2020-08-27

## 2020-08-27 ENCOUNTER — HOSPITAL ENCOUNTER (OUTPATIENT)
Dept: RADIATION ONCOLOGY | Age: 75
Discharge: HOME | End: 2020-08-27
Attending: RADIOLOGY
Payer: OTHER GOVERNMENT

## 2020-08-27 PROCEDURE — 77385 HC NTSTY MODUL RAD TX DLVR SMPL: CPT | Performed by: RADIOLOGY

## 2020-08-27 PROCEDURE — 77014 PR CT GUIDANCE PLACEMENT RAD THERAPY FIELDS: CPT | Performed by: RADIOLOGY

## 2020-08-27 NOTE — TELEPHONE ENCOUNTER
SW met with pt today after his daily radiation treatment. SW introduced self and role with our oncology patient. Pt did not easily engage and only provided one word answers to questions. He was provided with SW contact information if assistance could be provided. He did ask to ensure that his secondary insurance through [employer] was on file to be billed after [facility] coverage. SW spoke with  and confirmed that it was. Pt denies any needs at this time and states he will notify if needs arise.  Emir Jones, MSW, LISW-S, OSW-C  Oncology Social Worker

## 2020-08-27 NOTE — TELEPHONE ENCOUNTER
I reached out to patient based off of a referral from [address] [place] [address] who informed me that patient has some billing questions. Patient just wanted to know when we drop the bills to the [place], he didn't know if we would do it daily or weekly. I advised patient that we actually drop it monthly for radiation, so all DOS in August will drop in September. Patient verbalized understanding and was appreciative of me explaining this to him. He states he has no further questions/concerns at this time but was in agreement to reach out to me should any arise.  Electronically signed by Alondra Broussard on 8/27/2020 at 8:47 AM

## 2020-08-28 ENCOUNTER — HOSPITAL ENCOUNTER (OUTPATIENT)
Dept: RADIATION ONCOLOGY | Age: 75
Discharge: HOME | End: 2020-08-28
Attending: RADIOLOGY
Payer: OTHER GOVERNMENT

## 2020-08-28 PROCEDURE — 77427 RADIATION TX MANAGEMENT X5: CPT | Performed by: RADIOLOGY

## 2020-08-28 PROCEDURE — 77336 RADIATION PHYSICS CONSULT: CPT | Performed by: RADIOLOGY

## 2020-08-28 PROCEDURE — 77014 PR CT GUIDANCE PLACEMENT RAD THERAPY FIELDS: CPT | Performed by: RADIOLOGY

## 2020-08-28 PROCEDURE — 77385 HC NTSTY MODUL RAD TX DLVR SMPL: CPT | Performed by: RADIOLOGY

## 2020-08-31 ENCOUNTER — HOSPITAL ENCOUNTER (OUTPATIENT)
Dept: RADIATION ONCOLOGY | Age: 75
Discharge: HOME | End: 2020-08-31
Attending: RADIOLOGY
Payer: OTHER GOVERNMENT

## 2020-08-31 PROCEDURE — 77014 PR CT GUIDANCE PLACEMENT RAD THERAPY FIELDS: CPT | Performed by: RADIOLOGY

## 2020-08-31 PROCEDURE — 77385 HC NTSTY MODUL RAD TX DLVR SMPL: CPT | Performed by: RADIOLOGY

## 2020-09-01 ENCOUNTER — HOSPITAL ENCOUNTER (OUTPATIENT)
Dept: RADIATION ONCOLOGY | Age: 75
Discharge: HOME | End: 2020-09-01
Attending: RADIOLOGY
Payer: OTHER GOVERNMENT

## 2020-09-01 PROCEDURE — 77385 HC NTSTY MODUL RAD TX DLVR SMPL: CPT | Performed by: RADIOLOGY

## 2020-09-01 PROCEDURE — 77014 PR CT GUIDANCE PLACEMENT RAD THERAPY FIELDS: CPT | Performed by: RADIOLOGY

## 2020-09-02 ENCOUNTER — HOSPITAL ENCOUNTER (OUTPATIENT)
Dept: RADIATION ONCOLOGY | Age: 75
Discharge: HOME | End: 2020-09-02
Attending: RADIOLOGY
Payer: OTHER GOVERNMENT

## 2020-09-02 VITALS
DIASTOLIC BLOOD PRESSURE: 80 MMHG | SYSTOLIC BLOOD PRESSURE: 132 MMHG | TEMPERATURE: 97.2 F | RESPIRATION RATE: 18 BRPM | BODY MASS INDEX: 26.09 KG/M2 | WEIGHT: 166.6 LBS | HEART RATE: 57 BPM | OXYGEN SATURATION: 96 %

## 2020-09-02 PROCEDURE — 77014 PR CT GUIDANCE PLACEMENT RAD THERAPY FIELDS: CPT | Performed by: RADIOLOGY

## 2020-09-02 PROCEDURE — 77385 HC NTSTY MODUL RAD TX DLVR SMPL: CPT | Performed by: RADIOLOGY

## 2020-09-02 PROCEDURE — 99999 PR OFFICE/OUTPT VISIT,PROCEDURE ONLY: CPT | Performed by: RADIOLOGY

## 2020-09-02 NOTE — PATIENT INSTRUCTIONS
Continue daily fractionated radiation therapy as scheduled. Please see weekly OTV note and intial consultation letter in [facility] for clinical details. Taylor Barreto. Marsha Cole MD MS Amanuel Meredith:  [phone]   FAX: [phone] [address]:  [phone]   FAX:    [phone] [address]:  [phone]   FAX:  [phone]  Email: [email or web address]

## 2020-09-02 NOTE — PROGRESS NOTES
DEPARTMENT OF RADIATION ONCOLOGY ON TREATMENT VISIT         9/2/2020      NAME:  Funmi Wilder    YOB: 1945    Diagnosis: prostate cancer    SUBJECTIVE:   Mary Maria has now received fractionated external beam radiation therapy - ongoing. Past medical, surgical, social and family histories reviewed and updated as indicated. Pain: controlled    ALLERGIES:  Patient has no known allergies. Current Outpatient Medications   Medication Sig Dispense Refill    buPROPion (WELLBUTRIN) 75 MG tablet Take 150 mg by mouth 2 times daily      OLANZapine zydis (ZYPREXA) 5 MG disintegrating tablet Take 5 mg by mouth nightly      BUDESONIDE ER PO Take 4.5 mcg by mouth      lamoTRIgine (LAMICTAL) 150 MG tablet Take 150 mg by mouth daily      clonazePAM (KLONOPIN) 0.5 MG tablet TAKE 1 TABLET BY MOUTH THREE TIMES DAILY AS NEEDED FOR ANXIETY 270 tablet 0    fluticasone (FLONASE) 50 MCG/ACT nasal spray 2 sprays by Nasal route daily 1 Bottle 3    Misc Natural Products (PROSTATE SUPPORT PO) Take by mouth daily      SYMBICORT 160-4.5 MCG/ACT AERO 2 puffs as needed        No current facility-administered medications for this encounter. OBJECTIVE:  Alert and fully ambulatory. Pleasant and conversant. Physical Examination: General appearance - alert, well appearing, and in no distress. Wt Readings from Last 3 Encounters:   09/02/20 166 lb 9.6 oz (75.6 kg)   08/26/20 168 lb (76.2 kg)   07/31/20 166 lb (75.3 kg)         ASSESSMENT/PLAN:     Patient is tolerating treatments well with expected toxicities. RBA were reviewed prior to first fraction and PRN. Current and planned dose reviewed. Goals of treatment and potential side effects were reviewed with the patient PRN. Treatment imaging has been personally reviewed for accuracy and precision. Questions answered to apparent satisfaction. Treatments will continue as planned. Emelia Gage.  Shira Jeffery, MD MS MCGRATH  Radiation Oncologist        PHYSICIANS [facility] ([address] [place] [address]): [phone] /// FAX: [phone]  [facility]): [phone] /// FAX: [phone]  [address] [place] [address]): [phone] /// FAX: [phone]

## 2020-09-02 NOTE — PROGRESS NOTES
Perry JAZZ Wilder  9/2/2020  Wt Readings from Last 3 Encounters:   08/26/20 168 lb (76.2 kg)   07/31/20 166 lb (75.3 kg)   06/15/20 166 lb 11.2 oz (75.6 kg)     There is no height or weight on file to calculate BMI. Treatment Area:pelvis + Pros/prostate + proximal SV    Patient was seen today for weekly visit. Comfort Alteration  KPS:90%  Fatigue: None    Nutritional Alteration  Anorexia: No  Nausea: No  Vomiting: No     Elimination Alterations  Constipation: no  Diarrhea:  no  Urinary Frequency/Urgency: No  Urinary Retention: No  Dysuria: No  Urinary Incontinence: No  Proctitis: No  Nocturia: No #/night: 0     Skin Alteration   Sensation:na    Radiation Dermatitis:  na    Emotional  Coping: effective patient concerned regarding his finances and cost of treatment-at Doctor's request, Joel Luna notified to call patient by end of week. Sexuality Alteration  NA    Injury, potential bleeding or infection: NA    Lab Results   Component Value Date    WBC 7.1 10/21/2014     10/21/2014         There were no vitals taken for this visit. BP within normal range? yes        Assessment/Plan: patient has completed 8/44 fractions, 1440cGy/7920.     Shalini Del Real

## 2020-09-03 ENCOUNTER — HOSPITAL ENCOUNTER (OUTPATIENT)
Dept: RADIATION ONCOLOGY | Age: 75
Discharge: HOME | End: 2020-09-03
Attending: RADIOLOGY
Payer: OTHER GOVERNMENT

## 2020-09-03 ENCOUNTER — TELEPHONE (OUTPATIENT)
Dept: RADIATION ONCOLOGY | Age: 75
End: 2020-09-03

## 2020-09-03 PROCEDURE — 77385 HC NTSTY MODUL RAD TX DLVR SMPL: CPT | Performed by: RADIOLOGY

## 2020-09-03 PROCEDURE — 77014 PR CT GUIDANCE PLACEMENT RAD THERAPY FIELDS: CPT | Performed by: RADIOLOGY

## 2020-09-03 NOTE — TELEPHONE ENCOUNTER
I was asked by Сергей Zee RN, to reach out to patient. Message was left encouraging patient to return my call.  Electronically signed by Edouard York on 9/3/2020 at 9:31 AM

## 2020-09-04 ENCOUNTER — HOSPITAL ENCOUNTER (OUTPATIENT)
Dept: RADIATION ONCOLOGY | Age: 75
Discharge: HOME | End: 2020-09-04
Attending: RADIOLOGY
Payer: OTHER GOVERNMENT

## 2020-09-04 PROCEDURE — 77336 RADIATION PHYSICS CONSULT: CPT | Performed by: RADIOLOGY

## 2020-09-04 PROCEDURE — 77385 HC NTSTY MODUL RAD TX DLVR SMPL: CPT | Performed by: RADIOLOGY

## 2020-09-04 PROCEDURE — 77427 RADIATION TX MANAGEMENT X5: CPT | Performed by: RADIOLOGY

## 2020-09-04 PROCEDURE — 77014 PR CT GUIDANCE PLACEMENT RAD THERAPY FIELDS: CPT | Performed by: RADIOLOGY

## 2020-09-08 ENCOUNTER — TELEPHONE (OUTPATIENT)
Dept: RADIATION ONCOLOGY | Age: 75
End: 2020-09-08

## 2020-09-08 ENCOUNTER — HOSPITAL ENCOUNTER (OUTPATIENT)
Dept: RADIATION ONCOLOGY | Age: 75
Discharge: HOME | End: 2020-09-08
Attending: RADIOLOGY
Payer: OTHER GOVERNMENT

## 2020-09-08 PROCEDURE — 77014 PR CT GUIDANCE PLACEMENT RAD THERAPY FIELDS: CPT | Performed by: RADIOLOGY

## 2020-09-08 PROCEDURE — 77385 HC NTSTY MODUL RAD TX DLVR SMPL: CPT | Performed by: RADIOLOGY

## 2020-09-08 NOTE — TELEPHONE ENCOUNTER
Patient called back and wanted to know how we drop the bills. I advised him again that we drop it at the end of the month for all treatments provided during the month. Patient verbalized understanding and states he has no further questions/concerns at this time.  Electronically signed by Luis Armando Church on 9/8/2020 at 10:31 AM

## 2020-09-09 ENCOUNTER — HOSPITAL ENCOUNTER (OUTPATIENT)
Dept: RADIATION ONCOLOGY | Age: 75
Discharge: HOME | End: 2020-09-09
Attending: RADIOLOGY
Payer: OTHER GOVERNMENT

## 2020-09-09 VITALS
SYSTOLIC BLOOD PRESSURE: 134 MMHG | HEART RATE: 57 BPM | TEMPERATURE: 97.7 F | BODY MASS INDEX: 25.91 KG/M2 | RESPIRATION RATE: 18 BRPM | WEIGHT: 165.4 LBS | DIASTOLIC BLOOD PRESSURE: 66 MMHG | OXYGEN SATURATION: 99 %

## 2020-09-09 PROCEDURE — 77385 HC NTSTY MODUL RAD TX DLVR SMPL: CPT | Performed by: RADIOLOGY

## 2020-09-09 PROCEDURE — 99999 PR OFFICE/OUTPT VISIT,PROCEDURE ONLY: CPT | Performed by: RADIOLOGY

## 2020-09-09 PROCEDURE — 77014 PR CT GUIDANCE PLACEMENT RAD THERAPY FIELDS: CPT | Performed by: RADIOLOGY

## 2020-09-09 NOTE — PROGRESS NOTES
DEPARTMENT OF RADIATION ONCOLOGY ON TREATMENT VISIT         9/9/2020      NAME:  Perry Wilder    YOB: 1945    Diagnosis: prostate cancer    SUBJECTIVE:   Fili Boyd has now received fractionated external beam radiation therapy - ongoing. Past medical, surgical, social and family histories reviewed and updated as indicated. Pain: controlled    ALLERGIES:  Patient has no known allergies. Current Outpatient Medications   Medication Sig Dispense Refill    buPROPion (WELLBUTRIN) 75 MG tablet Take 150 mg by mouth 2 times daily      OLANZapine zydis (ZYPREXA) 5 MG disintegrating tablet Take 5 mg by mouth nightly      BUDESONIDE ER PO Take 4.5 mcg by mouth      lamoTRIgine (LAMICTAL) 150 MG tablet Take 150 mg by mouth daily      clonazePAM (KLONOPIN) 0.5 MG tablet TAKE 1 TABLET BY MOUTH THREE TIMES DAILY AS NEEDED FOR ANXIETY 270 tablet 0    fluticasone (FLONASE) 50 MCG/ACT nasal spray 2 sprays by Nasal route daily 1 Bottle 3    Misc Natural Products (PROSTATE SUPPORT PO) Take by mouth daily      SYMBICORT 160-4.5 MCG/ACT AERO 2 puffs as needed        No current facility-administered medications for this encounter. OBJECTIVE:  Alert and fully ambulatory. Pleasant and conversant. Physical Examination: General appearance - alert, well appearing, and in no distress. Wt Readings from Last 3 Encounters:   09/09/20 165 lb 6.4 oz (75 kg)   09/02/20 166 lb 9.6 oz (75.6 kg)   08/26/20 168 lb (76.2 kg)         ASSESSMENT/PLAN:     Patient is tolerating treatments well with expected toxicities. RBA were reviewed prior to first fraction and PRN. Current and planned dose reviewed. Goals of treatment and potential side effects were reviewed with the patient PRN. Treatment imaging has been personally reviewed for accuracy and precision. Questions answered to apparent satisfaction. Treatments will continue as planned. Jemima Ceja MD MS ALCIDES BERNABE  Radiation Oncologist        PHYSICIANS [facility] ([facility]): [phone] /// FAX: [phone]  [facility]): [phone] /// FAX: [phone]  [address] [place] [address]): [phone] /// FAX: [phone]

## 2020-09-09 NOTE — PROGRESS NOTES
Perry Wilder  9/9/2020  Wt Readings from Last 3 Encounters:   09/02/20 166 lb 9.6 oz (75.6 kg)   08/26/20 168 lb (76.2 kg)   07/31/20 166 lb (75.3 kg)     There is no height or weight on file to calculate BMI. Treatment Area:pelvis/pros/prostate/prox SV    Patient was seen today for weekly visit. Comfort Alteration  KPS:90%  Fatigue: Mild    Nutritional Alteration  Anorexia: No  Nausea: No  Vomiting: No     Elimination Alterations  Constipation: no  Diarrhea:  Yes 5 times/day with start being last Thurs/Fri  Urinary Frequency/Urgency: Yes  Urinary Retention: No  Dysuria: Yes, first thing every morning there is an issue with starting stream  Urinary Incontinence: No  Proctitis: yes-pt is utlixing A&D ointment and desitin  Nocturia: Yes #/night: 1     Skin Alteration   Sensation: anus is tender     Radiation Dermatitis:  See above    Emotional  Coping: effective    Sexuality Alteration  na    Injury, potential bleeding or infection: na    Lab Results   Component Value Date    WBC 7.1 10/21/2014     10/21/2014         There were no vitals taken for this visit. BP within normal range? yes         Assessment/Plan: patient has completed 12/44 fractions, 2160 cGy/7920.     Maricarmen Mays

## 2020-09-09 NOTE — PATIENT INSTRUCTIONS
Continue daily fractionated radiation therapy as scheduled. Please see weekly OTV note and intial consultation letter in [facility] for clinical details. Dilma Boy. Nicolas Hughes MD MS Oshea Barry:  [phone]   FAX: [phone] [address]:  [phone]   FAX:    [phone]  [address] [place] [address] Road:  [phone]   FAX:  [phone]  Email: [email or web address]

## 2020-09-10 ENCOUNTER — HOSPITAL ENCOUNTER (OUTPATIENT)
Dept: RADIATION ONCOLOGY | Age: 75
Discharge: HOME | End: 2020-09-10
Attending: RADIOLOGY
Payer: OTHER GOVERNMENT

## 2020-09-10 PROCEDURE — 77385 HC NTSTY MODUL RAD TX DLVR SMPL: CPT | Performed by: RADIOLOGY

## 2020-09-10 PROCEDURE — 77014 PR CT GUIDANCE PLACEMENT RAD THERAPY FIELDS: CPT | Performed by: RADIOLOGY

## 2020-09-11 ENCOUNTER — HOSPITAL ENCOUNTER (OUTPATIENT)
Dept: RADIATION ONCOLOGY | Age: 75
Discharge: HOME | End: 2020-09-11
Attending: RADIOLOGY
Payer: OTHER GOVERNMENT

## 2020-09-11 PROCEDURE — 77014 PR CT GUIDANCE PLACEMENT RAD THERAPY FIELDS: CPT | Performed by: RADIOLOGY

## 2020-09-11 PROCEDURE — 77385 HC NTSTY MODUL RAD TX DLVR SMPL: CPT | Performed by: RADIOLOGY

## 2020-09-14 ENCOUNTER — APPOINTMENT (OUTPATIENT)
Dept: RADIATION ONCOLOGY | Age: 75
End: 2020-09-14
Attending: RADIOLOGY
Payer: OTHER GOVERNMENT

## 2020-09-15 ENCOUNTER — APPOINTMENT (OUTPATIENT)
Dept: RADIATION ONCOLOGY | Age: 75
End: 2020-09-15
Attending: RADIOLOGY
Payer: OTHER GOVERNMENT

## 2020-09-15 ENCOUNTER — TELEPHONE (OUTPATIENT)
Dept: RADIATION ONCOLOGY | Age: 75
End: 2020-09-15

## 2020-09-15 NOTE — TELEPHONE ENCOUNTER
Radiation Oncology RN called to check on patient as he has been unable to come to treatment Mon/Tues. Patient wife stated patient has not felt well since [address] feels like a cold is coming on-weak only eating coffee and toast-RN encouraged wife to have patient come to treatment tomorrow as it is doctor day-Vitals and possible fluid administration can be addressed if needed. Wife states she will try her best and that she is hoping patient is not slipping into depression. Dr Alfredo Lu will be updated.

## 2020-09-16 ENCOUNTER — HOSPITAL ENCOUNTER (OUTPATIENT)
Dept: RADIATION ONCOLOGY | Age: 75
Discharge: HOME | End: 2020-09-16
Attending: RADIOLOGY
Payer: OTHER GOVERNMENT

## 2020-09-16 VITALS
SYSTOLIC BLOOD PRESSURE: 120 MMHG | WEIGHT: 163.9 LBS | OXYGEN SATURATION: 98 % | DIASTOLIC BLOOD PRESSURE: 64 MMHG | RESPIRATION RATE: 18 BRPM | BODY MASS INDEX: 25.67 KG/M2 | TEMPERATURE: 97.1 F | HEART RATE: 84 BPM

## 2020-09-16 PROCEDURE — 77336 RADIATION PHYSICS CONSULT: CPT | Performed by: RADIOLOGY

## 2020-09-16 PROCEDURE — 99999 PR OFFICE/OUTPT VISIT,PROCEDURE ONLY: CPT | Performed by: RADIOLOGY

## 2020-09-16 PROCEDURE — 77427 RADIATION TX MANAGEMENT X5: CPT | Performed by: RADIOLOGY

## 2020-09-16 PROCEDURE — 77014 PR CT GUIDANCE PLACEMENT RAD THERAPY FIELDS: CPT | Performed by: RADIOLOGY

## 2020-09-16 PROCEDURE — 77385 HC NTSTY MODUL RAD TX DLVR SMPL: CPT | Performed by: RADIOLOGY

## 2020-09-16 NOTE — PROGRESS NOTES
DEPARTMENT OF RADIATION ONCOLOGY ON TREATMENT VISIT         9/16/2020      NAME:  Perry Wilder    YOB: 1945    Diagnosis: prostate cancer    SUBJECTIVE:   Glen Harkins has now received fractionated external beam radiation therapy - ongoing. Past medical, surgical, social and family histories reviewed and updated as indicated. Pain: controlled    ALLERGIES:  Patient has no known allergies. Current Outpatient Medications   Medication Sig Dispense Refill    buPROPion (WELLBUTRIN) 75 MG tablet Take 150 mg by mouth 2 times daily      OLANZapine zydis (ZYPREXA) 5 MG disintegrating tablet Take 5 mg by mouth nightly      BUDESONIDE ER PO Take 4.5 mcg by mouth      lamoTRIgine (LAMICTAL) 150 MG tablet Take 150 mg by mouth daily      clonazePAM (KLONOPIN) 0.5 MG tablet TAKE 1 TABLET BY MOUTH THREE TIMES DAILY AS NEEDED FOR ANXIETY 270 tablet 0    fluticasone (FLONASE) 50 MCG/ACT nasal spray 2 sprays by Nasal route daily 1 Bottle 3    Misc Natural Products (PROSTATE SUPPORT PO) Take by mouth daily      SYMBICORT 160-4.5 MCG/ACT AERO 2 puffs as needed        No current facility-administered medications for this encounter. OBJECTIVE:  Alert and fully ambulatory. Pleasant and conversant. Physical Examination: General appearance - alert, well appearing, and in no distress. Wt Readings from Last 3 Encounters:   09/16/20 163 lb 14.4 oz (74.3 kg)   09/09/20 165 lb 6.4 oz (75 kg)   09/02/20 166 lb 9.6 oz (75.6 kg)         ASSESSMENT/PLAN:     Patient is tolerating treatments well with expected toxicities. RBA were reviewed prior to first fraction and PRN. Current and planned dose reviewed. Goals of treatment and potential side effects were reviewed with the patient PRN. Treatment imaging has been personally reviewed for accuracy and precision. Questions answered to apparent satisfaction. Treatments will continue as planned.         River Kirksey.

## 2020-09-16 NOTE — PROGRESS NOTES
Perry JAZZ Wilder  9/16/2020  Wt Readings from Last 3 Encounters:   09/09/20 165 lb 6.4 oz (75 kg)   09/02/20 166 lb 9.6 oz (75.6 kg)   08/26/20 168 lb (76.2 kg)     There is no height or weight on file to calculate BMI. Treatment Area:pelvis/pros/prostate/prox SV    Patient was seen today for weekly visit. Comfort Alteration  KPS:60%-not feeling well since Saturday night-cold-like symptoms  Fatigue: Severe    Nutritional Alteration  Anorexia: Yes  Nausea: Yes  Vomiting: No     Elimination Alterations  Constipation: no  Diarrhea:  yes  Urinary Frequency/Urgency: No  Urinary Retention: No  Dysuria: No  Urinary Incontinence: No  Proctitis: Yes  Nocturia: Yes #/night: 3     Skin Alteration   Sensation:na    Radiation Dermatitis:  na    Emotional  Coping: somewhat effective    Sexuality Alteration  na    Injury, potential bleeding or infection: na    Lab Results   Component Value Date    WBC 7.1 10/21/2014     10/21/2014         Temp 97.1 °F (36.2 °C) (Temporal)   BP within normal range?  yes           Assessment/Plan: patient has completed 25/44 fractions, 2700 cGy/7920    Gomez Avila

## 2020-09-16 NOTE — PATIENT INSTRUCTIONS
Continue daily fractionated radiation therapy as scheduled. Please see weekly OTV note and intial consultation letter in [facility] for clinical details. Dilma Boy. Nicolas Hughes MD MS Oshea Barry:  [phone]   FAX: [phone]  [address]:  [phone]   FAX:    [phone]  [address] [place] [address] Road:  [phone]   FAX:  [phone]  Email: [email or web address]

## 2020-09-17 ENCOUNTER — HOSPITAL ENCOUNTER (OUTPATIENT)
Dept: RADIATION ONCOLOGY | Age: 75
Discharge: HOME | End: 2020-09-17
Attending: RADIOLOGY
Payer: OTHER GOVERNMENT

## 2020-09-17 PROCEDURE — 77385 HC NTSTY MODUL RAD TX DLVR SMPL: CPT | Performed by: RADIOLOGY

## 2020-09-17 PROCEDURE — 77014 PR CT GUIDANCE PLACEMENT RAD THERAPY FIELDS: CPT | Performed by: RADIOLOGY

## 2020-09-18 ENCOUNTER — HOSPITAL ENCOUNTER (OUTPATIENT)
Dept: RADIATION ONCOLOGY | Age: 75
Discharge: HOME | End: 2020-09-18
Attending: RADIOLOGY
Payer: OTHER GOVERNMENT

## 2020-09-18 PROCEDURE — 77385 HC NTSTY MODUL RAD TX DLVR SMPL: CPT | Performed by: RADIOLOGY

## 2020-09-18 PROCEDURE — 77014 PR CT GUIDANCE PLACEMENT RAD THERAPY FIELDS: CPT | Performed by: RADIOLOGY

## 2020-09-21 ENCOUNTER — HOSPITAL ENCOUNTER (OUTPATIENT)
Dept: RADIATION ONCOLOGY | Age: 75
Discharge: HOME | End: 2020-09-21
Attending: RADIOLOGY
Payer: OTHER GOVERNMENT

## 2020-09-21 PROCEDURE — 77014 PR CT GUIDANCE PLACEMENT RAD THERAPY FIELDS: CPT | Performed by: RADIOLOGY

## 2020-09-21 PROCEDURE — 77385 HC NTSTY MODUL RAD TX DLVR SMPL: CPT | Performed by: RADIOLOGY

## 2020-09-22 ENCOUNTER — HOSPITAL ENCOUNTER (OUTPATIENT)
Dept: RADIATION ONCOLOGY | Age: 75
Discharge: HOME | End: 2020-09-22
Attending: RADIOLOGY
Payer: OTHER GOVERNMENT

## 2020-09-22 PROCEDURE — 77014 PR CT GUIDANCE PLACEMENT RAD THERAPY FIELDS: CPT | Performed by: RADIOLOGY

## 2020-09-22 PROCEDURE — 77385 HC NTSTY MODUL RAD TX DLVR SMPL: CPT | Performed by: RADIOLOGY

## 2020-09-23 ENCOUNTER — HOSPITAL ENCOUNTER (OUTPATIENT)
Dept: RADIATION ONCOLOGY | Age: 75
Discharge: HOME | End: 2020-09-23
Attending: RADIOLOGY
Payer: OTHER GOVERNMENT

## 2020-09-23 ENCOUNTER — TELEPHONE (OUTPATIENT)
Dept: RADIATION ONCOLOGY | Age: 75
End: 2020-09-23

## 2020-09-23 VITALS
HEART RATE: 73 BPM | OXYGEN SATURATION: 95 % | BODY MASS INDEX: 25.2 KG/M2 | WEIGHT: 160.9 LBS | RESPIRATION RATE: 18 BRPM | SYSTOLIC BLOOD PRESSURE: 138 MMHG | DIASTOLIC BLOOD PRESSURE: 76 MMHG

## 2020-09-23 PROCEDURE — 77385 HC NTSTY MODUL RAD TX DLVR SMPL: CPT | Performed by: RADIOLOGY

## 2020-09-23 PROCEDURE — 77427 RADIATION TX MANAGEMENT X5: CPT | Performed by: RADIOLOGY

## 2020-09-23 PROCEDURE — 77014 PR CT GUIDANCE PLACEMENT RAD THERAPY FIELDS: CPT | Performed by: RADIOLOGY

## 2020-09-23 PROCEDURE — 99999 PR OFFICE/OUTPT VISIT,PROCEDURE ONLY: CPT | Performed by: RADIOLOGY

## 2020-09-23 PROCEDURE — 77336 RADIATION PHYSICS CONSULT: CPT | Performed by: RADIOLOGY

## 2020-09-23 NOTE — TELEPHONE ENCOUNTER
Patient called in stating he received something that says \"this is not a bill\" but doesn't show insurance as paying anything. I explained to them that this was an EOB. As of today patient has no outstanding balance with the hospital.  July DOS have been paid at 100% and August is still pending insurance. September DOS will not drop to insurance until October. Patients wife verbalized understanding and was appreciative of me explaining this to her. She states they have no further questions/concerns at this time.  Electronically signed by Edouard York on 9/23/2020 at 2:07 PM

## 2020-09-23 NOTE — PATIENT INSTRUCTIONS
Continue daily fractionated radiation therapy as scheduled. Please see weekly OTV note and intial consultation letter in [facility] for clinical details. Jeremy Kang MD MS [place]:  [phone]   FAX: [phone]  [facility]:  [phone]   FAX:    [phone]  [address] [place] [address] Road:  [phone]   FAX:  [phone]  Email: [email or web address]

## 2020-09-23 NOTE — PROGRESS NOTES
Perry Wilder  9/23/2020  Wt Readings from Last 3 Encounters:   09/16/20 163 lb 14.4 oz (74.3 kg)   09/09/20 165 lb 6.4 oz (75 kg)   09/02/20 166 lb 9.6 oz (75.6 kg)     There is no height or weight on file to calculate BMI. Treatment Area:CTV pelvis/pros/prostate/prox SV    Patient was seen today for weekly visit. Comfort Alteration  KPS:90%  Fatigue: None    Nutritional Alteration  Anorexia: No  Nausea: No  Vomiting: No     Elimination Alterations  Constipation: yes, at times but improves in a short timeframe  Diarrhea:  Yes, at times, again improves fairly quickly  Urinary Frequency/Urgency: Yes  Urinary Retention: No  Dysuria: Yes, at night, takes time to start flow and then stops mid-way through flow  Urinary Incontinence: No  Proctitis: No  Nocturia: Yes #/night: 3     Skin Alteration   Sensation:na    Radiation Dermatitis:  na    Emotional  Coping: effective    Sexuality Alteration  na    Injury, potential bleeding or infection: na    Lab Results   Component Value Date    WBC 7.1 10/21/2014     10/21/2014         There were no vitals taken for this visit. BP within normal range?  yes         Assessment/Plan: patient has completed 20/44 fractions, 3600 cGy/7920    Ally Sparrow

## 2020-09-23 NOTE — PROGRESS NOTES
DEPARTMENT OF RADIATION ONCOLOGY ON TREATMENT VISIT         9/23/2020      NAME:  Stephanie Wilder    YOB: 1945    Diagnosis: prostate cancer    SUBJECTIVE:   Fili Boyd has now received fractionated external beam radiation therapy - ongoing. Past medical, surgical, social and family histories reviewed and updated as indicated. Pain: controlled    ALLERGIES:  Patient has no known allergies. Current Outpatient Medications   Medication Sig Dispense Refill    buPROPion (WELLBUTRIN) 75 MG tablet Take 150 mg by mouth 2 times daily      OLANZapine zydis (ZYPREXA) 5 MG disintegrating tablet Take 5 mg by mouth nightly      BUDESONIDE ER PO Take 4.5 mcg by mouth      lamoTRIgine (LAMICTAL) 150 MG tablet Take 150 mg by mouth daily      clonazePAM (KLONOPIN) 0.5 MG tablet TAKE 1 TABLET BY MOUTH THREE TIMES DAILY AS NEEDED FOR ANXIETY 270 tablet 0    fluticasone (FLONASE) 50 MCG/ACT nasal spray 2 sprays by Nasal route daily 1 Bottle 3    Misc Natural Products (PROSTATE SUPPORT PO) Take by mouth daily      SYMBICORT 160-4.5 MCG/ACT AERO 2 puffs as needed        No current facility-administered medications for this encounter. OBJECTIVE:  Alert and fully ambulatory. Pleasant and conversant. Physical Examination: General appearance - alert, well appearing, and in no distress. Wt Readings from Last 3 Encounters:   09/23/20 160 lb 14.4 oz (73 kg)   09/16/20 163 lb 14.4 oz (74.3 kg)   09/09/20 165 lb 6.4 oz (75 kg)         ASSESSMENT/PLAN:     Patient is tolerating treatments well with expected toxicities. RBA were reviewed prior to first fraction and PRN. Current and planned dose reviewed. Goals of treatment and potential side effects were reviewed with the patient PRN. Treatment imaging has been personally reviewed for accuracy and precision. Questions answered to apparent satisfaction. Treatments will continue as planned.         Jemima Garcia. Duane Hopping, MD MS DABR  Radiation Oncologist        [facility] ([address] [place] [address]): [phone] /// FAX: [phone]  [facility]): [phone] /// FAX: [phone]  [facility]): [phone] /// FAX: [phone]

## 2020-09-24 ENCOUNTER — HOSPITAL ENCOUNTER (OUTPATIENT)
Dept: RADIATION ONCOLOGY | Age: 75
Discharge: HOME | End: 2020-09-24
Attending: RADIOLOGY
Payer: OTHER GOVERNMENT

## 2020-09-24 PROCEDURE — 77385 HC NTSTY MODUL RAD TX DLVR SMPL: CPT | Performed by: RADIOLOGY

## 2020-09-24 PROCEDURE — 77014 PR CT GUIDANCE PLACEMENT RAD THERAPY FIELDS: CPT | Performed by: RADIOLOGY

## 2020-09-25 ENCOUNTER — HOSPITAL ENCOUNTER (OUTPATIENT)
Dept: RADIATION ONCOLOGY | Age: 75
Discharge: HOME | End: 2020-09-25
Attending: RADIOLOGY
Payer: OTHER GOVERNMENT

## 2020-09-25 PROCEDURE — 77385 HC NTSTY MODUL RAD TX DLVR SMPL: CPT | Performed by: RADIOLOGY

## 2020-09-25 PROCEDURE — 77014 PR CT GUIDANCE PLACEMENT RAD THERAPY FIELDS: CPT | Performed by: RADIOLOGY

## 2020-09-28 ENCOUNTER — HOSPITAL ENCOUNTER (OUTPATIENT)
Dept: RADIATION ONCOLOGY | Age: 75
Discharge: HOME | End: 2020-09-28
Attending: RADIOLOGY
Payer: OTHER GOVERNMENT

## 2020-09-28 PROCEDURE — 77385 HC NTSTY MODUL RAD TX DLVR SMPL: CPT | Performed by: RADIOLOGY

## 2020-09-28 PROCEDURE — 77014 PR CT GUIDANCE PLACEMENT RAD THERAPY FIELDS: CPT | Performed by: RADIOLOGY

## 2020-09-29 ENCOUNTER — HOSPITAL ENCOUNTER (OUTPATIENT)
Dept: RADIATION ONCOLOGY | Age: 75
Discharge: HOME | End: 2020-09-29
Attending: RADIOLOGY
Payer: OTHER GOVERNMENT

## 2020-09-29 PROCEDURE — 77385 HC NTSTY MODUL RAD TX DLVR SMPL: CPT | Performed by: RADIOLOGY

## 2020-09-29 PROCEDURE — 77014 PR CT GUIDANCE PLACEMENT RAD THERAPY FIELDS: CPT | Performed by: RADIOLOGY

## 2020-09-30 ENCOUNTER — HOSPITAL ENCOUNTER (OUTPATIENT)
Dept: RADIATION ONCOLOGY | Age: 75
Discharge: HOME | End: 2020-09-30
Attending: RADIOLOGY
Payer: OTHER GOVERNMENT

## 2020-09-30 VITALS — BODY MASS INDEX: 25.15 KG/M2 | WEIGHT: 160.6 LBS

## 2020-09-30 PROCEDURE — 77385 HC NTSTY MODUL RAD TX DLVR SMPL: CPT | Performed by: RADIOLOGY

## 2020-09-30 PROCEDURE — 77014 PR CT GUIDANCE PLACEMENT RAD THERAPY FIELDS: CPT | Performed by: RADIOLOGY

## 2020-09-30 PROCEDURE — 99999 PR OFFICE/OUTPT VISIT,PROCEDURE ONLY: CPT | Performed by: RADIOLOGY

## 2020-09-30 PROCEDURE — 77336 RADIATION PHYSICS CONSULT: CPT | Performed by: RADIOLOGY

## 2020-09-30 PROCEDURE — 77427 RADIATION TX MANAGEMENT X5: CPT | Performed by: RADIOLOGY

## 2020-09-30 RX ORDER — TAMSULOSIN HYDROCHLORIDE 0.4 MG/1
0.4 CAPSULE ORAL DAILY
COMMUNITY
End: 2020-12-11

## 2020-09-30 NOTE — PROGRESS NOTES
Perry JAZZ Wilder  9/30/2020  Wt Readings from Last 3 Encounters:   09/30/20 160 lb 9.6 oz (72.8 kg)   09/23/20 160 lb 14.4 oz (73 kg)   09/16/20 163 lb 14.4 oz (74.3 kg)     Body mass index is 25.15 kg/m². Treatment Area:prostate   Patient was seen today for weekly visit. Comfort Alteration  KPS:80%  Fatigue: Mild    Nutritional Alteration  Anorexia: No  Nausea: No  Vomiting: No     Elimination Alterations  Constipation: no  Diarrhea:  Yes - recommended immodium  Urinary Frequency/Urgency: Yes  Urinary Retention: Yes  Dysuria: No  Urinary Incontinence: Yes  Proctitis: No  Nocturia: Yes #/night: 3     Skin Alteration   Sensation:NA     Radiation Dermatitis:  NA     Emotional  Coping: effective    Sexuality Alteration  NA     Injury, potential bleeding or infection: NA     Lab Results   Component Value Date    WBC 7.1 10/21/2014     10/21/2014         Wt 160 lb 9.6 oz (72.8 kg)   BMI 25.15 kg/m²   BP within normal range? yes       Assessment/Plan:   Pt is complaining of urinary retention & nocturia that is becoming very bothersome. Flomax called in to [facility]. No other complaints.     Erin Balderas

## 2020-10-01 ENCOUNTER — HOSPITAL ENCOUNTER (OUTPATIENT)
Dept: RADIATION ONCOLOGY | Age: 75
Discharge: HOME | End: 2020-10-01
Attending: RADIOLOGY
Payer: OTHER GOVERNMENT

## 2020-10-01 PROCEDURE — 77385 HC NTSTY MODUL RAD TX DLVR SMPL: CPT | Performed by: RADIOLOGY

## 2020-10-01 PROCEDURE — 77014 PR CT GUIDANCE PLACEMENT RAD THERAPY FIELDS: CPT | Performed by: RADIOLOGY

## 2020-10-02 ENCOUNTER — HOSPITAL ENCOUNTER (OUTPATIENT)
Dept: RADIATION ONCOLOGY | Age: 75
Discharge: HOME | End: 2020-10-02
Attending: RADIOLOGY
Payer: OTHER GOVERNMENT

## 2020-10-02 PROCEDURE — 77385 HC NTSTY MODUL RAD TX DLVR SMPL: CPT | Performed by: RADIOLOGY

## 2020-10-02 PROCEDURE — 77014 PR CT GUIDANCE PLACEMENT RAD THERAPY FIELDS: CPT | Performed by: RADIOLOGY

## 2020-10-05 ENCOUNTER — HOSPITAL ENCOUNTER (OUTPATIENT)
Dept: RADIATION ONCOLOGY | Age: 75
Discharge: HOME | End: 2020-10-05
Attending: RADIOLOGY
Payer: OTHER GOVERNMENT

## 2020-10-05 PROCEDURE — 77014 PR CT GUIDANCE PLACEMENT RAD THERAPY FIELDS: CPT | Performed by: RADIOLOGY

## 2020-10-05 PROCEDURE — 77385 HC NTSTY MODUL RAD TX DLVR SMPL: CPT | Performed by: RADIOLOGY

## 2020-10-05 NOTE — PATIENT INSTRUCTIONS
Continue daily fractionated radiation therapy as scheduled. Please see weekly OTV note and intial consultation letter in [facility] for clinical details. Gauri Chou MD MS Landen Juniorar:  [phone]   FAX: [phone]  [address] [place] [address]:  [phone]   FAX:    [phone]  [address] [place] [address] Road:  [phone]   FAX:  [phone]  Email: [email or web address]

## 2020-10-05 NOTE — PROGRESS NOTES
DEPARTMENT OF RADIATION ONCOLOGY ON TREATMENT VISIT         10/5/2020        NAME:  Dmitry Wilder    YOB: 1945    Diagnosis: prostate cancer    SUBJECTIVE:   Kayla Conway has now received fractionated external beam radiation therapy - ongoing. Past medical, surgical, social and family histories reviewed and updated as indicated. Pain: controlled    ALLERGIES:  Patient has no known allergies. Current Outpatient Medications   Medication Sig Dispense Refill    tamsulosin (FLOMAX) 0.4 MG capsule Take 0.4 mg by mouth daily In evening      buPROPion (WELLBUTRIN) 75 MG tablet Take 150 mg by mouth 2 times daily      OLANZapine zydis (ZYPREXA) 5 MG disintegrating tablet Take 5 mg by mouth nightly      BUDESONIDE ER PO Take 4.5 mcg by mouth      lamoTRIgine (LAMICTAL) 150 MG tablet Take 150 mg by mouth daily      clonazePAM (KLONOPIN) 0.5 MG tablet TAKE 1 TABLET BY MOUTH THREE TIMES DAILY AS NEEDED FOR ANXIETY 270 tablet 0    fluticasone (FLONASE) 50 MCG/ACT nasal spray 2 sprays by Nasal route daily 1 Bottle 3    Misc Natural Products (PROSTATE SUPPORT PO) Take by mouth daily      SYMBICORT 160-4.5 MCG/ACT AERO 2 puffs as needed        No current facility-administered medications for this encounter. OBJECTIVE:  Alert and fully ambulatory. Pleasant and conversant.    -dysuria    Physical Examination: General appearance - alert, well appearing, and in no distress. Wt Readings from Last 3 Encounters:   09/30/20 160 lb 9.6 oz (72.8 kg)   09/23/20 160 lb 14.4 oz (73 kg)   09/16/20 163 lb 14.4 oz (74.3 kg)         ASSESSMENT/PLAN:     Patient is tolerating treatments well with expected toxicities. RBA were reviewed prior to first fraction and PRN. Current and planned dose reviewed. Goals of treatment and potential side effects were reviewed with the patient PRN.  Treatment imaging has been personally reviewed for accuracy and precision. Questions answered to apparent satisfaction. Treatments will continue as planned. Simba Álvarez.  Chet Martin MD MS DABR  Radiation Oncologist        [facility] ([facility]): [phone] /// FAX: [phone]  [facility]): [phone] /// FAX: [phone]  [address] [place] [address]): [phone] /// FAX: [phone]

## 2020-10-06 ENCOUNTER — HOSPITAL ENCOUNTER (OUTPATIENT)
Dept: RADIATION ONCOLOGY | Age: 75
Discharge: HOME | End: 2020-10-06
Attending: RADIOLOGY
Payer: OTHER GOVERNMENT

## 2020-10-06 PROCEDURE — 77385 HC NTSTY MODUL RAD TX DLVR SMPL: CPT | Performed by: RADIOLOGY

## 2020-10-06 PROCEDURE — 77014 PR CT GUIDANCE PLACEMENT RAD THERAPY FIELDS: CPT | Performed by: RADIOLOGY

## 2020-10-07 ENCOUNTER — HOSPITAL ENCOUNTER (OUTPATIENT)
Dept: RADIATION ONCOLOGY | Age: 75
Discharge: HOME | End: 2020-10-07
Attending: RADIOLOGY
Payer: OTHER GOVERNMENT

## 2020-10-07 VITALS
OXYGEN SATURATION: 98 % | HEART RATE: 62 BPM | BODY MASS INDEX: 25.12 KG/M2 | WEIGHT: 160.38 LBS | RESPIRATION RATE: 18 BRPM | DIASTOLIC BLOOD PRESSURE: 70 MMHG | SYSTOLIC BLOOD PRESSURE: 136 MMHG | TEMPERATURE: 96.7 F

## 2020-10-07 PROCEDURE — 77427 RADIATION TX MANAGEMENT X5: CPT | Performed by: RADIOLOGY

## 2020-10-07 PROCEDURE — 77014 PR CT GUIDANCE PLACEMENT RAD THERAPY FIELDS: CPT | Performed by: RADIOLOGY

## 2020-10-07 PROCEDURE — 77336 RADIATION PHYSICS CONSULT: CPT | Performed by: RADIOLOGY

## 2020-10-07 PROCEDURE — 99999 PR OFFICE/OUTPT VISIT,PROCEDURE ONLY: CPT | Performed by: RADIOLOGY

## 2020-10-07 PROCEDURE — 77385 HC NTSTY MODUL RAD TX DLVR SMPL: CPT | Performed by: RADIOLOGY

## 2020-10-07 NOTE — PROGRESS NOTES
DEPARTMENT OF RADIATION ONCOLOGY ON TREATMENT VISIT         10/7/2020      NAME:  Perry Wilder    YOB: 1945    Diagnosis:  Prostate cancer    SUBJECTIVE:   Kayla Escobar has now received fractionated external beam radiation therapy - ongoing. Past medical, surgical, social and family histories reviewed and updated as indicated. Pain: controlled    ALLERGIES:  Patient has no known allergies. Current Outpatient Medications   Medication Sig Dispense Refill    tamsulosin (FLOMAX) 0.4 MG capsule Take 0.4 mg by mouth daily In evening      buPROPion (WELLBUTRIN) 75 MG tablet Take 150 mg by mouth 2 times daily      OLANZapine zydis (ZYPREXA) 5 MG disintegrating tablet Take 5 mg by mouth nightly      BUDESONIDE ER PO Take 4.5 mcg by mouth      lamoTRIgine (LAMICTAL) 150 MG tablet Take 150 mg by mouth daily      clonazePAM (KLONOPIN) 0.5 MG tablet TAKE 1 TABLET BY MOUTH THREE TIMES DAILY AS NEEDED FOR ANXIETY 270 tablet 0    fluticasone (FLONASE) 50 MCG/ACT nasal spray 2 sprays by Nasal route daily 1 Bottle 3    Misc Natural Products (PROSTATE SUPPORT PO) Take by mouth daily      SYMBICORT 160-4.5 MCG/ACT AERO 2 puffs as needed        No current facility-administered medications for this encounter. OBJECTIVE:  Alert and fully ambulatory. Pleasant and conversant. Physical Examination: General appearance - alert, well appearing, and in no distress. Wt Readings from Last 3 Encounters:   10/07/20 160 lb 6 oz (72.7 kg)   09/30/20 160 lb 9.6 oz (72.8 kg)   09/23/20 160 lb 14.4 oz (73 kg)         ASSESSMENT/PLAN:     Patient is tolerating treatments well with expected toxicities. RBA were reviewed prior to first fraction and PRN. Current and planned dose reviewed. Goals of treatment and potential side effects were reviewed with the patient PRN. Treatment imaging has been personally reviewed for accuracy and precision.     Questions answered to apparent satisfaction. Treatments will continue as planned. Marylen Pass.  Kya Cruz MD MS DABR  Radiation Oncologist        [facility] ([facility]): [phone] /// FAX: [phone]  [facility]): [phone] /// FAX: [phone]  [facility]): [phone] /// FAX: [phone]

## 2020-10-08 ENCOUNTER — HOSPITAL ENCOUNTER (OUTPATIENT)
Dept: RADIATION ONCOLOGY | Age: 75
Discharge: HOME | End: 2020-10-08
Attending: RADIOLOGY
Payer: OTHER GOVERNMENT

## 2020-10-08 PROCEDURE — 77385 HC NTSTY MODUL RAD TX DLVR SMPL: CPT | Performed by: RADIOLOGY

## 2020-10-08 PROCEDURE — 77014 PR CT GUIDANCE PLACEMENT RAD THERAPY FIELDS: CPT | Performed by: RADIOLOGY

## 2020-10-09 ENCOUNTER — HOSPITAL ENCOUNTER (OUTPATIENT)
Dept: RADIATION ONCOLOGY | Age: 75
Discharge: HOME | End: 2020-10-09
Attending: RADIOLOGY
Payer: OTHER GOVERNMENT

## 2020-10-09 PROCEDURE — 77014 PR CT GUIDANCE PLACEMENT RAD THERAPY FIELDS: CPT | Performed by: RADIOLOGY

## 2020-10-09 PROCEDURE — 77385 HC NTSTY MODUL RAD TX DLVR SMPL: CPT | Performed by: RADIOLOGY

## 2020-10-12 ENCOUNTER — HOSPITAL ENCOUNTER (OUTPATIENT)
Dept: RADIATION ONCOLOGY | Age: 75
Discharge: HOME | End: 2020-10-12
Attending: RADIOLOGY
Payer: OTHER GOVERNMENT

## 2020-10-12 PROCEDURE — 77385 HC NTSTY MODUL RAD TX DLVR SMPL: CPT | Performed by: RADIOLOGY

## 2020-10-12 PROCEDURE — 77014 PR CT GUIDANCE PLACEMENT RAD THERAPY FIELDS: CPT | Performed by: RADIOLOGY

## 2020-10-13 ENCOUNTER — HOSPITAL ENCOUNTER (OUTPATIENT)
Dept: RADIATION ONCOLOGY | Age: 75
Discharge: HOME | End: 2020-10-13
Attending: RADIOLOGY
Payer: OTHER GOVERNMENT

## 2020-10-13 PROCEDURE — 77385 HC NTSTY MODUL RAD TX DLVR SMPL: CPT | Performed by: RADIOLOGY

## 2020-10-13 PROCEDURE — 77014 PR CT GUIDANCE PLACEMENT RAD THERAPY FIELDS: CPT | Performed by: RADIOLOGY

## 2020-10-14 ENCOUNTER — HOSPITAL ENCOUNTER (OUTPATIENT)
Dept: RADIATION ONCOLOGY | Age: 75
Discharge: HOME | End: 2020-10-14
Attending: RADIOLOGY
Payer: OTHER GOVERNMENT

## 2020-10-14 VITALS
TEMPERATURE: 97.2 F | RESPIRATION RATE: 18 BRPM | BODY MASS INDEX: 25.06 KG/M2 | HEART RATE: 60 BPM | DIASTOLIC BLOOD PRESSURE: 70 MMHG | SYSTOLIC BLOOD PRESSURE: 138 MMHG | WEIGHT: 160 LBS

## 2020-10-14 PROCEDURE — 77336 RADIATION PHYSICS CONSULT: CPT | Performed by: RADIOLOGY

## 2020-10-14 PROCEDURE — 77014 PR CT GUIDANCE PLACEMENT RAD THERAPY FIELDS: CPT | Performed by: RADIOLOGY

## 2020-10-14 PROCEDURE — 77427 RADIATION TX MANAGEMENT X5: CPT | Performed by: RADIOLOGY

## 2020-10-14 PROCEDURE — 77385 HC NTSTY MODUL RAD TX DLVR SMPL: CPT | Performed by: RADIOLOGY

## 2020-10-14 PROCEDURE — 99999 PR OFFICE/OUTPT VISIT,PROCEDURE ONLY: CPT | Performed by: RADIOLOGY

## 2020-10-14 NOTE — PATIENT INSTRUCTIONS
Continue daily fractionated radiation therapy as scheduled. Please see weekly OTV note and intial consultation letter in [facility] for clinical details. Emiyl Vasquez. Cortez Trivedi MD MS [facility]:  [phone]   FAX: [phone]  [address]:  [phone]   FAX:    [phone] [address]:  [phone]   FAX:  [phone]  Email: [email or web address]

## 2020-10-15 ENCOUNTER — HOSPITAL ENCOUNTER (OUTPATIENT)
Dept: RADIATION ONCOLOGY | Age: 75
Discharge: HOME | End: 2020-10-15
Attending: RADIOLOGY
Payer: OTHER GOVERNMENT

## 2020-10-15 PROCEDURE — 77014 PR CT GUIDANCE PLACEMENT RAD THERAPY FIELDS: CPT | Performed by: RADIOLOGY

## 2020-10-15 PROCEDURE — 77385 HC NTSTY MODUL RAD TX DLVR SMPL: CPT | Performed by: RADIOLOGY

## 2020-10-16 ENCOUNTER — HOSPITAL ENCOUNTER (OUTPATIENT)
Dept: RADIATION ONCOLOGY | Age: 75
Discharge: HOME | End: 2020-10-16
Attending: RADIOLOGY
Payer: OTHER GOVERNMENT

## 2020-10-16 PROCEDURE — 77385 HC NTSTY MODUL RAD TX DLVR SMPL: CPT | Performed by: RADIOLOGY

## 2020-10-16 PROCEDURE — 77014 PR CT GUIDANCE PLACEMENT RAD THERAPY FIELDS: CPT | Performed by: RADIOLOGY

## 2020-10-19 ENCOUNTER — HOSPITAL ENCOUNTER (OUTPATIENT)
Dept: RADIATION ONCOLOGY | Age: 75
Discharge: HOME | End: 2020-10-19
Attending: RADIOLOGY
Payer: OTHER GOVERNMENT

## 2020-10-19 PROCEDURE — 77385 HC NTSTY MODUL RAD TX DLVR SMPL: CPT | Performed by: RADIOLOGY

## 2020-10-19 PROCEDURE — 77014 PR CT GUIDANCE PLACEMENT RAD THERAPY FIELDS: CPT | Performed by: RADIOLOGY

## 2020-10-20 ENCOUNTER — HOSPITAL ENCOUNTER (OUTPATIENT)
Dept: RADIATION ONCOLOGY | Age: 75
Discharge: HOME | End: 2020-10-20
Attending: RADIOLOGY
Payer: OTHER GOVERNMENT

## 2020-10-20 PROCEDURE — 77014 PR CT GUIDANCE PLACEMENT RAD THERAPY FIELDS: CPT | Performed by: RADIOLOGY

## 2020-10-20 PROCEDURE — 77385 HC NTSTY MODUL RAD TX DLVR SMPL: CPT | Performed by: RADIOLOGY

## 2020-10-21 ENCOUNTER — HOSPITAL ENCOUNTER (OUTPATIENT)
Dept: RADIATION ONCOLOGY | Age: 75
Discharge: HOME | End: 2020-10-21
Attending: RADIOLOGY
Payer: OTHER GOVERNMENT

## 2020-10-21 VITALS
DIASTOLIC BLOOD PRESSURE: 72 MMHG | RESPIRATION RATE: 18 BRPM | WEIGHT: 159.6 LBS | HEART RATE: 64 BPM | BODY MASS INDEX: 25 KG/M2 | OXYGEN SATURATION: 98 % | SYSTOLIC BLOOD PRESSURE: 112 MMHG

## 2020-10-21 PROCEDURE — G6002 STEREOSCOPIC X-RAY GUIDANCE: HCPCS | Performed by: RADIOLOGY

## 2020-10-21 PROCEDURE — 77385 HC NTSTY MODUL RAD TX DLVR SMPL: CPT | Performed by: RADIOLOGY

## 2020-10-21 PROCEDURE — 77336 RADIATION PHYSICS CONSULT: CPT | Performed by: RADIOLOGY

## 2020-10-21 PROCEDURE — 99999 PR OFFICE/OUTPT VISIT,PROCEDURE ONLY: CPT | Performed by: RADIOLOGY

## 2020-10-21 PROCEDURE — 77427 RADIATION TX MANAGEMENT X5: CPT | Performed by: RADIOLOGY

## 2020-10-22 ENCOUNTER — HOSPITAL ENCOUNTER (OUTPATIENT)
Dept: RADIATION ONCOLOGY | Age: 75
Discharge: HOME | End: 2020-10-22
Attending: RADIOLOGY
Payer: OTHER GOVERNMENT

## 2020-10-22 PROCEDURE — 77014 PR CT GUIDANCE PLACEMENT RAD THERAPY FIELDS: CPT | Performed by: RADIOLOGY

## 2020-10-22 PROCEDURE — 77385 HC NTSTY MODUL RAD TX DLVR SMPL: CPT | Performed by: RADIOLOGY

## 2020-10-22 RX ORDER — CLONAZEPAM 0.5 MG/1
TABLET ORAL
Qty: 270 TABLET | Refills: 0 | Status: SHIPPED
Start: 2020-10-22 | End: 2021-02-03 | Stop reason: SDUPTHER

## 2020-10-23 ENCOUNTER — HOSPITAL ENCOUNTER (OUTPATIENT)
Dept: RADIATION ONCOLOGY | Age: 75
Discharge: HOME | End: 2020-10-23
Attending: RADIOLOGY
Payer: OTHER GOVERNMENT

## 2020-10-23 PROCEDURE — 77014 PR CT GUIDANCE PLACEMENT RAD THERAPY FIELDS: CPT | Performed by: RADIOLOGY

## 2020-10-23 PROCEDURE — 77385 HC NTSTY MODUL RAD TX DLVR SMPL: CPT | Performed by: RADIOLOGY

## 2020-10-26 ENCOUNTER — HOSPITAL ENCOUNTER (OUTPATIENT)
Dept: RADIATION ONCOLOGY | Age: 75
Discharge: HOME | End: 2020-10-26
Attending: RADIOLOGY
Payer: OTHER GOVERNMENT

## 2020-10-26 PROCEDURE — 77385 HC NTSTY MODUL RAD TX DLVR SMPL: CPT | Performed by: RADIOLOGY

## 2020-10-26 PROCEDURE — 77014 PR CT GUIDANCE PLACEMENT RAD THERAPY FIELDS: CPT | Performed by: RADIOLOGY

## 2020-10-26 NOTE — PATIENT INSTRUCTIONS
Continue daily fractionated radiation therapy as scheduled. Please see weekly OTV note and intial consultation letter in [facility] for clinical details. Familia Pablo. Scott Bucio MD MS Anais Schneider:  [phone]   FAX: [phone]  [address] [facility]:  [phone]   FAX:    [phone]  [address] [place] [address] Road:  [phone]   FAX:  [phone]  Email: [email or web address]

## 2020-10-26 NOTE — PROGRESS NOTES
Perry Wilder  10/21/2020  Wt Readings from Last 3 Encounters:   10/14/20 160 lb (72.6 kg)   10/07/20 160 lb 6 oz (72.7 kg)   09/30/20 160 lb 9.6 oz (72.8 kg)     There is no height or weight on file to calculate BMI. Treatment Area:CTV pelvis/pros/prostate/prox SV    Patient was seen today for weekly visit. Comfort Alteration  KPS:90%  Fatigue: None    Nutritional Alteration  Anorexia: No  Nausea: No  Vomiting: No     Elimination Alterations  Constipation: no  Diarrhea:  yes  Urinary Frequency/Urgency: Yes  Urinary Retention: No  Dysuria: No  Urinary Incontinence: No  Proctitis: No  Nocturia: Yes #/night: 5     Skin Alteration   Sensation:na    Radiation Dermatitis:  na    Emotional  Coping: effective    Sexuality Alteration  na    Injury, potential bleeding or infection: na    Lab Results   Component Value Date    WBC 7.1 10/21/2014     10/21/2014         There were no vitals taken for this visit. BP within normal range?  yes        Assessment/Plan: patient has completed 40/44 fractions, 7200 cGy/7940    Concepcion Lara
answered to apparent satisfaction. Treatments will continue as planned. Catrachito Sharma.  Vinicius Alegria MD MS DABR  Radiation Oncologist        [facility] ([address] [place] [address]): [phone] /// FAX: [phone]  [facility]): [phone] /// FAX: [phone]  [facility]): [phone] /// FAX: [phone]

## 2020-10-27 ENCOUNTER — HOSPITAL ENCOUNTER (OUTPATIENT)
Dept: RADIATION ONCOLOGY | Age: 75
Discharge: HOME | End: 2020-10-27
Attending: RADIOLOGY
Payer: OTHER GOVERNMENT

## 2020-10-27 PROCEDURE — 77336 RADIATION PHYSICS CONSULT: CPT | Performed by: RADIOLOGY

## 2020-10-27 PROCEDURE — 77385 HC NTSTY MODUL RAD TX DLVR SMPL: CPT | Performed by: RADIOLOGY

## 2020-10-27 PROCEDURE — 77014 PR CT GUIDANCE PLACEMENT RAD THERAPY FIELDS: CPT | Performed by: RADIOLOGY

## 2020-10-27 NOTE — PROGRESS NOTES
Perry Wilder  10/27/2020  8:03 AM          Current Outpatient Medications   Medication Sig Dispense Refill    clonazePAM (KLONOPIN) 0.5 MG tablet TAKE 1 TABLET BY MOUTH THREE TIMES DAILY AS NEEDED FOR ANXIETY 270 tablet 0    tamsulosin (FLOMAX) 0.4 MG capsule Take 0.4 mg by mouth daily In evening      buPROPion (WELLBUTRIN) 75 MG tablet Take 150 mg by mouth 2 times daily      OLANZapine zydis (ZYPREXA) 5 MG disintegrating tablet Take 5 mg by mouth nightly      BUDESONIDE ER PO Take 4.5 mcg by mouth      lamoTRIgine (LAMICTAL) 150 MG tablet Take 150 mg by mouth daily      fluticasone (FLONASE) 50 MCG/ACT nasal spray 2 sprays by Nasal route daily 1 Bottle 3    Misc Natural Products (PROSTATE SUPPORT PO) Take by mouth daily      SYMBICORT 160-4.5 MCG/ACT AERO 2 puffs as needed        No current facility-administered medications for this encounter. This is an up-to-date medication list.    Please take this list to your next care provider, and discard any previous medication lists.

## 2020-10-30 ENCOUNTER — OFFICE VISIT (OUTPATIENT)
Dept: PRIMARY CARE CLINIC | Age: 75
End: 2020-10-30
Payer: MEDICARE

## 2020-10-30 VITALS
TEMPERATURE: 97.7 F | BODY MASS INDEX: 24.12 KG/M2 | WEIGHT: 154 LBS | HEART RATE: 82 BPM | DIASTOLIC BLOOD PRESSURE: 68 MMHG | OXYGEN SATURATION: 95 % | SYSTOLIC BLOOD PRESSURE: 124 MMHG

## 2020-10-30 PROCEDURE — 99213 OFFICE O/P EST LOW 20 MIN: CPT | Performed by: FAMILY MEDICINE

## 2020-10-30 RX ORDER — AZITHROMYCIN 250 MG/1
TABLET, FILM COATED ORAL
Qty: 1 PACKET | Refills: 0 | Status: SHIPPED
Start: 2020-10-30 | End: 2020-12-10

## 2020-10-30 ASSESSMENT — ENCOUNTER SYMPTOMS
BLOOD IN STOOL: 0
COUGH: 1
ABDOMINAL PAIN: 0
SHORTNESS OF BREATH: 1
SINUS PRESSURE: 1
NAUSEA: 0
RHINORRHEA: 1
WHEEZING: 0
VOMITING: 0
SINUS PAIN: 1
DIARRHEA: 1
SORE THROAT: 0

## 2020-10-30 NOTE — PROGRESS NOTES
congestion, postnasal drip, rhinorrhea, sinus pressure and sinus pain. Negative for ear pain and sore throat. Some loss of taste and smell. Respiratory: Positive for cough (slight) and shortness of breath. Negative for wheezing. Cardiovascular: Negative for chest pain, palpitations and leg swelling. Gastrointestinal: Positive for diarrhea. Negative for abdominal pain, blood in stool, nausea (since radiation treatments) and vomiting. Genitourinary: Positive for difficulty urinating and frequency. Urologist prescribed Tamsulosin for hesitaancy and difficulty urinating. It is helping. Musculoskeletal: Negative for arthralgias, joint pain and myalgias. Skin: Negative for rash. Neurological: Positive for headaches. Negative for dizziness, light-headedness and numbness. Psychiatric/Behavioral: Positive for sleep disturbance (secondary to sinus congestion). The patient is not nervous/anxious. other review of systems reviewed and are negative    OBJECTIVE:  /68   Pulse 82   Temp 97.7 °F (36.5 °C)   Wt 154 lb (69.9 kg)   SpO2 95%   BMI 24.12 kg/m²      Physical Exam  Constitutional:       Appearance: Normal appearance. HENT:      Head: Normocephalic and atraumatic. Right Ear: Tympanic membrane, ear canal and external ear normal.      Left Ear: Tympanic membrane, ear canal and external ear normal.      Nose: Congestion present. Mouth/Throat:      Mouth: Mucous membranes are moist.      Pharynx: Oropharynx is clear. No oropharyngeal exudate or posterior oropharyngeal erythema. Eyes:      Conjunctiva/sclera: Conjunctivae normal.      Pupils: Pupils are equal, round, and reactive to light. Cardiovascular:      Rate and Rhythm: Normal rate and regular rhythm. Pulses: Normal pulses. Heart sounds: Normal heart sounds. Pulmonary:      Effort: Pulmonary effort is normal.      Breath sounds: Normal breath sounds.    Abdominal:      General: Bowel sounds are normal.      Palpations: Abdomen is soft. Tenderness: There is no abdominal tenderness. Musculoskeletal:      Right lower leg: No edema. Left lower leg: No edema. Skin:     General: Skin is warm and dry. Neurological:      Mental Status: He is alert. Psychiatric:         Mood and Affect: Mood normal.         Behavior: Behavior normal.         ASSESSMENT AND PLAN:    Wilfrido Kye was seen today for 3 month follow-up and uri. Diagnoses and all orders for this visit:    Acute non-recurrent ethmoidal sinusitis  -     azithromycin (ZITHROMAX Z-ANAY) 250 MG tablet; Take 2 pills on day 1, then 1 pill days 2-5.    - consider covid testing if symptoms worsen or change. - tylenol prn     Return if symptoms worsen or fail to improve. April Ng DO  I reviewed the students documentation ( Hx, exam, MDM ), examined the patient and performed the A&P.

## 2020-11-16 ENCOUNTER — TELEPHONE (OUTPATIENT)
Dept: RADIATION ONCOLOGY | Age: 75
End: 2020-11-16

## 2020-11-18 ENCOUNTER — TELEPHONE (OUTPATIENT)
Dept: RADIATION ONCOLOGY | Age: 75
End: 2020-11-18

## 2020-11-18 NOTE — TELEPHONE ENCOUNTER
I received a call from patient and wife that he had VA coverage for treatment and just received a packet in the mail that Isidor Kawasaki was paying. Upon reviewing accounts, VA was billed for account [de-identified], but then Isidor Kawasaki was billed for the following two accounts and should not have been as [place] was the primary coverage [phone] and [phone]. This information was forwarded to , Dyan Rodriguez, to have it corrected. Patient and wife were appreciative of my help and have no further questions/concerns at this time.  Electronically signed by Jairon Chisholm on 11/18/2020 at 1:03 PM

## 2020-12-10 ENCOUNTER — TELEPHONE (OUTPATIENT)
Dept: CASE MANAGEMENT | Age: 75
End: 2020-12-10

## 2020-12-10 ENCOUNTER — HOSPITAL ENCOUNTER (OUTPATIENT)
Dept: RADIATION ONCOLOGY | Age: 75
Discharge: HOME | End: 2020-12-10
Attending: RADIOLOGY
Payer: MEDICARE

## 2020-12-10 VITALS
WEIGHT: 156.7 LBS | OXYGEN SATURATION: 98 % | SYSTOLIC BLOOD PRESSURE: 126 MMHG | RESPIRATION RATE: 18 BRPM | HEART RATE: 67 BPM | BODY MASS INDEX: 24.54 KG/M2 | DIASTOLIC BLOOD PRESSURE: 80 MMHG

## 2020-12-10 PROCEDURE — 99999 PR OFFICE/OUTPT VISIT,PROCEDURE ONLY: CPT | Performed by: NURSE PRACTITIONER

## 2020-12-10 NOTE — TELEPHONE ENCOUNTER
Met with patient prior to his follow up appointment with SUMA Moreno today. Patient completed his active treatment October 2020 for his Prostate cancer. Patient appears in good spirits. States he is doing well. He follows with the [facility] and has an appointment with them with lab work prior after the first of the year. Provided support and encouragement. Instructed patient in detail on his Cancer Treatment Summary and Survivorship Care Plan. Copies electronically sent to patient's PCP and VA. Provided written copies of both plus Patient Resource: Cancer Survivorship: A Guide for Patients and Their Families booklet,  Local Resources for Cancer Survivors, [facility] [employer]. Patient will follow with the [place] for his care. Instructed to call with any questions or concerns. Verbally agreed. Patient appreciative of visit and information.

## 2020-12-10 NOTE — PROGRESS NOTES
Perry Rashidyari  12/10/2020  9:53 AM      There were no vitals filed for this visit. :    Wt Readings from Last 3 Encounters:   10/30/20 154 lb (69.9 kg)   10/21/20 159 lb 9.6 oz (72.4 kg)   10/14/20 160 lb (72.6 kg)                Current Outpatient Medications:     clonazePAM (KLONOPIN) 0.5 MG tablet, TAKE 1 TABLET BY MOUTH THREE TIMES DAILY AS NEEDED FOR ANXIETY, Disp: 270 tablet, Rfl: 0    tamsulosin (FLOMAX) 0.4 MG capsule, Take 0.4 mg by mouth daily In evening, Disp: , Rfl:     buPROPion (WELLBUTRIN) 75 MG tablet, Take 150 mg by mouth 2 times daily, Disp: , Rfl:     OLANZapine zydis (ZYPREXA) 5 MG disintegrating tablet, Take 5 mg by mouth nightly, Disp: , Rfl:     lamoTRIgine (LAMICTAL) 150 MG tablet, Take 150 mg by mouth daily, Disp: , Rfl:     fluticasone (FLONASE) 50 MCG/ACT nasal spray, 2 sprays by Nasal route daily, Disp: 1 Bottle, Rfl: 3    Misc Natural Products (PROSTATE SUPPORT PO), Take by mouth daily, Disp: , Rfl:     SYMBICORT 160-4.5 MCG/ACT AERO, 2 puffs as needed , Disp: , Rfl:       Patient is seen today in follow up, 6 week check having received radiation therapy to the pelvis/prostate/prox sv/pros, 7920 cGy in 44 fractions from 8/20/2020 through 10/27/2020, related to high grade adenocarcinoma of the prostate, Reddy 4+4=8, to the right mid gland. Patient is a , continuing with healthy life style choices of walking 3 miles daily. Patient states he has lost weight with a decrease in his appetite and the types of foods that he eats. Patient states he is not eating a lot of red meat at this time. Patient has questions regarding how frequently his PSA should be checked post-treatment as well as who he will follow from this poit forward. Patient has in the past followed with Dr Cheryl Brunner, Urology, but does not have any additional appointments at this time. Patient states he follows with the [address] [place] [address] here in [facility] but does not have an appointment until next year.  Patient does states he has a frequency issue with urination but that he is managing with frequent trips to the restroom before leaving his home. Patient is not taking flomax as he feels it was not beneficial. Patient also states that, at times, he has some difficulty starting a stream but, that if he is patient, he is able to empty his bladder. FALLS RISK SCREENING ASSESSMENT    Instructions:  Assess the patient and enter the appropriate indicators that are present for fall risk identification. Total the numbers entered and assign a fall risk score from Table 2.  Reassess patient at a minimum every 12 weeks or with status change. Assessment   Date  12/10/2020     1. Mental Ability: confusion/cognitively impaired No - 0       2. Elimination Issues: incontinence, frequency No - 0       3. Ambulatory: use of assistive devices (walker, cane, off-loading devices), attached to equipment (IV pole, oxygen) No - 0     4. Sensory Limitations: dizziness, vertigo, impaired vision No - 0       5. Age 72 years or greater - 1       10. Medication: diuretics, strong analgesics, hypnotics, sedatives, antihypertensive agents   Yes - 3   7. Falls:  recent history of falls within the last 3 months (not to include slipping or tripping)   No - 0   TOTAL 4    If score of 4 or greater was education given? Yes       TABLE 2   Risk Score Risk Level Plan of Care   0-3 Little or  No Risk 1. Provide assistance as indicated for ambulation activities  2. Reorient confused/cognitively impaired patient  3. Call-light/bell within patient's reach  4. Chair/bed in low position, stretcher/bed with siderails up except when performing patient care activities  5. Educate patient/family/caregiver on falls prevention  6.  Reassess in 12 weeks or with any noted change in patient condition which places them at a risk for a fall   4-6 Moderate Risk 1. Provide assistance as indicated for ambulation activities  2.   Reorient confused/cognitively patient with [employer] bag with coupons  · Route to the dietitian via [address]

## 2020-12-10 NOTE — PROGRESS NOTES
RADIATION ONCOLOGY  6 week follow up         12/10/2020      NAME:  Mukund Wilder    YOB: 1945    CC: Pt returns today for re-assessment of radiation treatment area. HPI: Pooja Richards is a pleasant 76 y.o. male with a diagnosis of high risk prostate cancer, s/p ADT and radiation therapy. The patient underwent a course of radiation therapy to the prostate and proximal SV, which was completed on 10/27/20. He completed 7920 cGy in 44 fractions. States that he is doing well. Urinary symptoms include hesitancy. States that \"sometimes hard to get flow going. \" Refuses Flomax as \"this does not work for me. I've tried it. \" Denies burning or blood in urine. Nocturia as prior to XRT. Pt is following with urology at the [place]. States that PCP will be ordering PSAs, with next appt in January 2021. Pain: Denies pain at this time. Past medical, surgical, social and family histories reviewed and updated as indicated. ALLERGIES:  Patient has no known allergies. Current Outpatient Medications   Medication Sig Dispense Refill    clonazePAM (KLONOPIN) 0.5 MG tablet TAKE 1 TABLET BY MOUTH THREE TIMES DAILY AS NEEDED FOR ANXIETY 270 tablet 0    tamsulosin (FLOMAX) 0.4 MG capsule Take 0.4 mg by mouth daily In evening      buPROPion (WELLBUTRIN) 75 MG tablet Take 150 mg by mouth 2 times daily      OLANZapine zydis (ZYPREXA) 5 MG disintegrating tablet Take 5 mg by mouth nightly      lamoTRIgine (LAMICTAL) 150 MG tablet Take 150 mg by mouth daily      fluticasone (FLONASE) 50 MCG/ACT nasal spray 2 sprays by Nasal route daily 1 Bottle 3    Misc Natural Products (PROSTATE SUPPORT PO) Take by mouth daily      SYMBICORT 160-4.5 MCG/ACT AERO 2 puffs as needed        No current facility-administered medications for this encounter.           Physical Examination:   Vitals:    12/10/20 0955   BP: 126/80   Pulse: 67   Resp: 18   SpO2: 98%   Weight: 156 lb 11.2 oz (71.1 kg)       Wt Readings from Last 3 Encounters:   12/10/20 156 lb 11.2 oz (71.1 kg)   10/30/20 154 lb (69.9 kg)   10/21/20 159 lb 9.6 oz (72.4 kg)       Alert and fully ambulatory. Pleasant and conversant. Irradiated skin intact. HR reg, rhythm reg. Lungs CTA. ASSESSMENT/PLAN:     High risk prostate cancer, s/p ADT and radiation therapy to the prostate and proximal SV completed on 10/27/20 (7920 cGy in 44 fractions). Patient is doing well post-radiation completion. Skin care and sun care reviewed. Signs and symptoms of recurrence reviewed. PSA to be done per NCCN guidelines by PCP per patient report. Patient may try AZO OTC to help with hesitancy. Refuses Flomax stating \"it doesn't work on me. \"    Cont to follow with urology at the [facility]. States that PCP will be ordering PSAs, with next appt in January 2021. I discussed follow up plans with Anny Ahmadi. At this time, Dr Mary Ellen Cooper will see the patient back on PRN basis as he has [employer] insurance and is required [facility] unless otherwise instructed. Anny Ahmadi is to follow up with other providers involved in their care as directed (including but not limited to Medical Oncology, Primary Care, Pulmonary, and Surgery). The patient was given our contact number in the event that if at any time they change their mind and would like to return to the clinic to see either myself or one of the Radiation Oncologists, they can simply call us and we would be happy to see them sooner. Thank you for involving us in the management of this extremely pleasant patient. More than 15 min was in direct contact with pt coordinating/giving care. >50% of the visit was spent in counseling the pt on the following: Follow up care    The nurses notes were reviewed and incorporated into this assessment and plan. Questions answered to apparent satisfaction.       Nicole Kamara, MSN, RN, APRN-CNP  Nurse Practitioner for Radiation Oncology    [facility]) [facility]: [phone] (IIX: [phone])  [address] [place] [address]: [phone] (KUB: [phone])  [facility]) [facility]:  [phone] (WJF:  [phone])

## 2021-01-27 ENCOUNTER — TELEPHONE (OUTPATIENT)
Dept: RADIATION ONCOLOGY | Age: 76
End: 2021-01-27

## 2021-01-27 NOTE — TELEPHONE ENCOUNTER
I received a voicemail from patients wife stating we billed the wrong address for the [place] coverage. She was asking me to give her a call back to see if anything can be done. Attempt #1: Message was left encouraging patient to return my call.  Electronically signed by Zell Phalen on 1/27/2021 at 12:51 PM

## 2021-02-03 ENCOUNTER — OFFICE VISIT (OUTPATIENT)
Dept: PRIMARY CARE CLINIC | Age: 76
End: 2021-02-03
Payer: MEDICARE

## 2021-02-03 VITALS
HEART RATE: 64 BPM | BODY MASS INDEX: 25.69 KG/M2 | WEIGHT: 164 LBS | SYSTOLIC BLOOD PRESSURE: 134 MMHG | OXYGEN SATURATION: 98 % | DIASTOLIC BLOOD PRESSURE: 70 MMHG | TEMPERATURE: 97.4 F

## 2021-02-03 DIAGNOSIS — F41.8 DEPRESSION WITH ANXIETY: Primary | ICD-10-CM

## 2021-02-03 DIAGNOSIS — R51.9 BENIGN HEADACHE: ICD-10-CM

## 2021-02-03 PROCEDURE — 99213 OFFICE O/P EST LOW 20 MIN: CPT | Performed by: FAMILY MEDICINE

## 2021-02-03 RX ORDER — CLONAZEPAM 0.5 MG/1
TABLET ORAL
Qty: 270 TABLET | Refills: 0 | Status: SHIPPED
Start: 2021-02-03 | End: 2021-05-04 | Stop reason: SDUPTHER

## 2021-02-03 ASSESSMENT — ENCOUNTER SYMPTOMS
NAUSEA: 0
PHOTOPHOBIA: 0

## 2021-02-26 RX ORDER — ALBUTEROL SULFATE 90 UG/1
2 AEROSOL, METERED RESPIRATORY (INHALATION) EVERY 6 HOURS PRN
Qty: 1 INHALER | Refills: 1 | Status: SHIPPED
Start: 2021-02-26 | End: 2021-08-26 | Stop reason: SDUPTHER

## 2021-02-26 NOTE — PROGRESS NOTES
Spoke with patient's wife last night his asthma has been kicking up a little bit. He is back on his Symbicort but not using it as ordered which is 2 puffs twice a day. He should be on that until the asthma calms down then he can wean back off of it. I will also call in an albuterol metered-dose inhaler to use as needed for shortness of breath, wheeze, and cough. Follow-up if worsens.

## 2021-04-20 LAB
CHOLESTEROL, TOTAL: 207 MG/DL
CHOLESTEROL/HDL RATIO: ABNORMAL
HDLC SERPL-MCNC: 105 MG/DL (ref 35–70)
LDL CHOLESTEROL CALCULATED: 91 MG/DL (ref 0–160)
NONHDLC SERPL-MCNC: ABNORMAL MG/DL
PROSTATE SPECIFIC ANTIGEN: 0.1 NG/ML
TRIGL SERPL-MCNC: 40 MG/DL
VLDLC SERPL CALC-MCNC: ABNORMAL MG/DL

## 2021-05-04 ENCOUNTER — OFFICE VISIT (OUTPATIENT)
Dept: PRIMARY CARE CLINIC | Age: 76
End: 2021-05-04
Payer: MEDICARE

## 2021-05-04 VITALS
BODY MASS INDEX: 26 KG/M2 | OXYGEN SATURATION: 95 % | SYSTOLIC BLOOD PRESSURE: 134 MMHG | DIASTOLIC BLOOD PRESSURE: 70 MMHG | TEMPERATURE: 97.2 F | HEART RATE: 61 BPM | WEIGHT: 166 LBS

## 2021-05-04 DIAGNOSIS — F41.8 DEPRESSION WITH ANXIETY: ICD-10-CM

## 2021-05-04 PROCEDURE — 99213 OFFICE O/P EST LOW 20 MIN: CPT | Performed by: FAMILY MEDICINE

## 2021-05-04 RX ORDER — CLONAZEPAM 0.5 MG/1
TABLET ORAL
Qty: 270 TABLET | Refills: 0 | Status: SHIPPED
Start: 2021-05-04 | End: 2021-08-26 | Stop reason: SDUPTHER

## 2021-05-04 ASSESSMENT — ENCOUNTER SYMPTOMS
COUGH: 0
SINUS PRESSURE: 0
NAUSEA: 0
SINUS PAIN: 0
DIARRHEA: 0
SORE THROAT: 0
WHEEZING: 0
VOMITING: 0
SHORTNESS OF BREATH: 0
CONSTIPATION: 0
TROUBLE SWALLOWING: 0
BLOOD IN STOOL: 0
BACK PAIN: 0
ABDOMINAL PAIN: 0

## 2021-05-04 NOTE — PROGRESS NOTES
Genitourinary: Negative for difficulty urinating, dysuria and hematuria. Musculoskeletal: Negative for arthralgias and back pain. Skin: Negative for pallor and rash. Neurological: Negative for syncope, weakness, light-headedness, numbness and headaches. Hematological: Negative for adenopathy. Psychiatric/Behavioral: Negative for agitation, behavioral problems, decreased concentration, dysphoric mood and sleep disturbance. The patient is not nervous/anxious. other review of systems reviewed and are negative    OBJECTIVE:  /70   Pulse 61   Temp 97.2 °F (36.2 °C)   Wt 166 lb (75.3 kg)   SpO2 95%   BMI 26.00 kg/m²      Physical Exam  Constitutional:       Appearance: Normal appearance. HENT:      Head: Normocephalic and atraumatic. Right Ear: Tympanic membrane normal.      Left Ear: Tympanic membrane normal.      Nose: Nose normal.      Mouth/Throat:      Mouth: Mucous membranes are dry. Eyes:      Extraocular Movements: Extraocular movements intact. Pupils: Pupils are equal, round, and reactive to light. Neck:      Vascular: No carotid bruit. Cardiovascular:      Rate and Rhythm: Normal rate and regular rhythm. Pulses: Normal pulses. Heart sounds: No murmur. No gallop. Pulmonary:      Effort: Pulmonary effort is normal.      Breath sounds: Normal breath sounds. No wheezing, rhonchi or rales. Abdominal:      General: Bowel sounds are normal.      Palpations: Abdomen is soft. Tenderness: There is no abdominal tenderness. There is no guarding or rebound. Musculoskeletal: Normal range of motion. Right lower leg: No edema. Left lower leg: No edema. Lymphadenopathy:      Cervical: No cervical adenopathy. Skin:     General: Skin is warm and dry. Capillary Refill: Capillary refill takes less than 2 seconds. Neurological:      General: No focal deficit present. Mental Status: He is alert and oriented to person, place, and time. Psychiatric:         Mood and Affect: Mood normal.         Behavior: Behavior normal.         ASSESSMENT AND PLAN:    Thuy Ambrocio was seen today for depression and anxiety. Diagnoses and all orders for this visit:    Depression with anxiety  -     clonazePAM (KLONOPIN) 0.5 MG tablet; TAKE 1 TABLET BY MOUTH THREE TIMES DAILY AS NEEDED FOR ANXIETY    - reviewed labs from [place]  - continue current dose of Klonopin. Return in about 3 months (around 8/4/2021) for medicare pe, or for acute problem.     Prachi Carney, DO

## 2021-07-07 ENCOUNTER — VIRTUAL VISIT (OUTPATIENT)
Dept: PRIMARY CARE CLINIC | Age: 76
End: 2021-07-07
Payer: MEDICARE

## 2021-07-07 DIAGNOSIS — R43.0 LOSS OF SENSE OF SMELL: Primary | ICD-10-CM

## 2021-07-07 DIAGNOSIS — R43.2 LOSS OF TASTE: ICD-10-CM

## 2021-07-07 PROCEDURE — 99212 OFFICE O/P EST SF 10 MIN: CPT | Performed by: FAMILY MEDICINE

## 2021-07-07 RX ORDER — DOXYCYCLINE HYCLATE 100 MG
100 TABLET ORAL 2 TIMES DAILY
Qty: 14 TABLET | Refills: 0 | Status: SHIPPED | OUTPATIENT
Start: 2021-07-07 | End: 2021-07-14

## 2021-07-07 SDOH — ECONOMIC STABILITY: FOOD INSECURITY: WITHIN THE PAST 12 MONTHS, THE FOOD YOU BOUGHT JUST DIDN'T LAST AND YOU DIDN'T HAVE MONEY TO GET MORE.: NEVER TRUE

## 2021-07-07 SDOH — ECONOMIC STABILITY: FOOD INSECURITY: WITHIN THE PAST 12 MONTHS, YOU WORRIED THAT YOUR FOOD WOULD RUN OUT BEFORE YOU GOT MONEY TO BUY MORE.: NEVER TRUE

## 2021-07-07 ASSESSMENT — ENCOUNTER SYMPTOMS
SINUS PRESSURE: 0
RHINORRHEA: 0
SORE THROAT: 0
SHORTNESS OF BREATH: 0
COUGH: 0
SINUS PAIN: 0

## 2021-07-07 ASSESSMENT — SOCIAL DETERMINANTS OF HEALTH (SDOH): HOW HARD IS IT FOR YOU TO PAY FOR THE VERY BASICS LIKE FOOD, HOUSING, MEDICAL CARE, AND HEATING?: NOT HARD AT ALL

## 2021-07-07 NOTE — PROGRESS NOTES
Pooja Richards, a male of 76 y. o.did a virtual visit on 7/7/2021. Patient Active Problem List   Diagnosis    Depression with anxiety    Mild intermittent asthma without complication    Prostate cancer ([facility]          HPI heent: problems tasting and smelling for 6 wks. No other symptoms. Gen: no F/C. Has been vaccinated against covid. No Known Allergies    Current Outpatient Medications on File Prior to Visit   Medication Sig Dispense Refill    albuterol sulfate  (90 Base) MCG/ACT inhaler Inhale 2 puffs into the lungs every 6 hours as needed for Wheezing 1 Inhaler 1    buPROPion (WELLBUTRIN) 75 MG tablet Take 150 mg by mouth 2 times daily      OLANZapine zydis (ZYPREXA) 5 MG disintegrating tablet Take 5 mg by mouth nightly      lamoTRIgine (LAMICTAL) 150 MG tablet Take 150 mg by mouth daily      fluticasone (FLONASE) 50 MCG/ACT nasal spray 2 sprays by Nasal route daily 1 Bottle 3    Misc Natural Products (PROSTATE SUPPORT PO) Take by mouth daily      SYMBICORT 160-4.5 MCG/ACT AERO 2 puffs as needed       clonazePAM (KLONOPIN) 0.5 MG tablet TAKE 1 TABLET BY MOUTH THREE TIMES DAILY AS NEEDED FOR ANXIETY 270 tablet 0     No current facility-administered medications on file prior to visit. Review of Systems   Constitutional: Negative for chills and fever. HENT: Negative for congestion, ear pain, postnasal drip, rhinorrhea, sinus pressure, sinus pain and sore throat. Respiratory: Negative for cough and shortness of breath. Neurological: Negative for headaches. other review of systems reviewed and are negative    OBJECTIVE:  There were no vitals taken for this visit. Physical Exam  Constitutional:       General: He is not in acute distress. Appearance: Normal appearance. Neurological:      Mental Status: He is alert and oriented to person, place, and time.    Psychiatric:         Mood and Affect: Mood normal.         ASSESSMENT AND PLAN:    Mukund Wylie was seen today

## 2021-08-11 VITALS
HEART RATE: 96 BPM | BODY MASS INDEX: 24.8 KG/M2 | RESPIRATION RATE: 12 BRPM | WEIGHT: 158 LBS | HEIGHT: 67 IN | TEMPERATURE: 97.3 F | SYSTOLIC BLOOD PRESSURE: 156 MMHG | DIASTOLIC BLOOD PRESSURE: 81 MMHG | OXYGEN SATURATION: 94 %

## 2021-08-11 LAB
BACTERIA: ABNORMAL /HPF
BASOPHILS ABSOLUTE: 0.03 E9/L (ref 0–0.2)
BASOPHILS RELATIVE PERCENT: 0.6 % (ref 0–2)
BILIRUBIN URINE: NEGATIVE
BLOOD, URINE: NORMAL
CLARITY: CLEAR
COLOR: YELLOW
EOSINOPHILS ABSOLUTE: 0.25 E9/L (ref 0.05–0.5)
EOSINOPHILS RELATIVE PERCENT: 4.8 % (ref 0–6)
GLUCOSE URINE: NEGATIVE MG/DL
HCT VFR BLD CALC: 40.4 % (ref 37–54)
HEMOGLOBIN: 13.9 G/DL (ref 12.5–16.5)
IMMATURE GRANULOCYTES #: 0.02 E9/L
IMMATURE GRANULOCYTES %: 0.4 % (ref 0–5)
KETONES, URINE: NEGATIVE MG/DL
LEUKOCYTE ESTERASE, URINE: NEGATIVE
LYMPHOCYTES ABSOLUTE: 1.3 E9/L (ref 1.5–4)
LYMPHOCYTES RELATIVE PERCENT: 25 % (ref 20–42)
MCH RBC QN AUTO: 32.6 PG (ref 26–35)
MCHC RBC AUTO-ENTMCNC: 34.4 % (ref 32–34.5)
MCV RBC AUTO: 94.6 FL (ref 80–99.9)
MONOCYTES ABSOLUTE: 0.53 E9/L (ref 0.1–0.95)
MONOCYTES RELATIVE PERCENT: 10.2 % (ref 2–12)
NEUTROPHILS ABSOLUTE: 3.08 E9/L (ref 1.8–7.3)
NEUTROPHILS RELATIVE PERCENT: 59 % (ref 43–80)
NITRITE, URINE: NEGATIVE
PDW BLD-RTO: 13.1 FL (ref 11.5–15)
PH UA: 6 (ref 5–9)
PLATELET # BLD: 223 E9/L (ref 130–450)
PMV BLD AUTO: 9.8 FL (ref 7–12)
PROTEIN UA: NEGATIVE MG/DL
RBC # BLD: 4.27 E12/L (ref 3.8–5.8)
RBC UA: ABNORMAL /HPF (ref 0–2)
SPECIFIC GRAVITY UA: 1.02 (ref 1–1.03)
UROBILINOGEN, URINE: 0.2 E.U./DL
WBC # BLD: 5.2 E9/L (ref 4.5–11.5)
WBC UA: ABNORMAL /HPF (ref 0–5)

## 2021-08-11 PROCEDURE — 81001 URINALYSIS AUTO W/SCOPE: CPT

## 2021-08-11 PROCEDURE — 87088 URINE BACTERIA CULTURE: CPT

## 2021-08-11 PROCEDURE — 80053 COMPREHEN METABOLIC PANEL: CPT

## 2021-08-11 PROCEDURE — 85025 COMPLETE CBC W/AUTO DIFF WBC: CPT

## 2021-08-11 PROCEDURE — 36415 COLL VENOUS BLD VENIPUNCTURE: CPT

## 2021-08-11 PROCEDURE — 99283 EMERGENCY DEPT VISIT LOW MDM: CPT

## 2021-08-12 ENCOUNTER — HOSPITAL ENCOUNTER (EMERGENCY)
Age: 76
Discharge: HOME | End: 2021-08-12
Payer: MEDICARE

## 2021-08-12 DIAGNOSIS — N30.00 ACUTE CYSTITIS WITHOUT HEMATURIA: Primary | ICD-10-CM

## 2021-08-12 DIAGNOSIS — N40.1 BENIGN PROSTATIC HYPERPLASIA WITH URINARY FREQUENCY: ICD-10-CM

## 2021-08-12 DIAGNOSIS — R35.0 BENIGN PROSTATIC HYPERPLASIA WITH URINARY FREQUENCY: ICD-10-CM

## 2021-08-12 LAB
ALBUMIN SERPL-MCNC: 4.3 G/DL (ref 3.5–5.2)
ALP BLD-CCNC: 89 U/L (ref 40–129)
ALT SERPL-CCNC: 12 U/L (ref 0–40)
ANION GAP SERPL CALCULATED.3IONS-SCNC: 9 MMOL/L (ref 7–16)
AST SERPL-CCNC: 17 U/L (ref 0–39)
BILIRUB SERPL-MCNC: <0.2 MG/DL (ref 0–1.2)
BUN BLDV-MCNC: 17 MG/DL (ref 6–23)
CALCIUM SERPL-MCNC: 10.4 MG/DL (ref 8.6–10.2)
CHLORIDE BLD-SCNC: 104 MMOL/L (ref 98–107)
CO2: 26 MMOL/L (ref 22–29)
CREAT SERPL-MCNC: 0.8 MG/DL (ref 0.7–1.2)
GFR AFRICAN AMERICAN: >60
GFR NON-AFRICAN AMERICAN: >60 ML/MIN/1.73
GLUCOSE BLD-MCNC: 94 MG/DL (ref 74–99)
POTASSIUM SERPL-SCNC: 4.1 MMOL/L (ref 3.5–5)
SODIUM BLD-SCNC: 139 MMOL/L (ref 132–146)
TOTAL PROTEIN: 7.1 G/DL (ref 6.4–8.3)

## 2021-08-12 PROCEDURE — 6370000000 HC RX 637 (ALT 250 FOR IP): Performed by: NURSE PRACTITIONER

## 2021-08-12 RX ORDER — CEPHALEXIN 500 MG/1
500 CAPSULE ORAL ONCE
Status: COMPLETED | OUTPATIENT
Start: 2021-08-12 | End: 2021-08-12

## 2021-08-12 RX ORDER — CEPHALEXIN 500 MG/1
500 CAPSULE ORAL 2 TIMES DAILY
Qty: 20 CAPSULE | Refills: 0 | Status: SHIPPED | OUTPATIENT
Start: 2021-08-12 | End: 2021-08-22

## 2021-08-12 RX ADMIN — CEPHALEXIN 500 MG: 500 CAPSULE ORAL at 01:41

## 2021-08-12 NOTE — ED NOTES
FIRST PROVIDER CONTACT ASSESSMENT NOTE                                                                                                Department of Emergency Medicine                                                      First Provider Note  21  11:21 PM EDT  NAME: Pardeep Beasley  : 1945  MRN: [id number]    Chief Complaint: No chief complaint on file. History of Present Illness:   Pardeep Beasley is a 76 y.o. male who presents to the ED for urinary frequency hesitancy. Patient reports symptoms ongoing the last several days, patient does report a past history of prostate cancer was seen by urology and states that he had another taken care of. Patient denies fevers denies any back or flank pain. Blood noted in the urine. Focused Physical Exam:  VS:    ED Triage Vitals [21 2305]   BP Temp Temp src Pulse Resp SpO2 Height Weight   -- 97.3 °F (36.3 °C) -- 96 -- 94 % -- --        General: Alert and in no apparent distress. Medical History:  has a past medical history of Anxiety, Asthma, Bipolar disorder ([facility] [address] Degenerative disc disease, lumbar, Depression, Diverticulosis, and Prostate cancer ([facility] [address] [facility] Surgical History:  has a past surgical history that includes Cholecystectomy; hernia repair; and Colonoscopy (2004). Social History:  reports that he has never smoked. He has never used smokeless tobacco. He reports current alcohol use. He reports that he does not use drugs. Family History: family history includes Cancer (age of onset: 72) in his mother. Allergies: Patient has no known allergies.      Initial Plan of Care:  Initiate Treatment-Testing, Proceed toTreatment Area When Bed Available for ED Attending/MLP to Continue Care    -------------------------------------------------END OF FIRST PROVIDER CONTACT ASSESSMENT NOTE--------------------------------------------------------  Electronically signed by EMANUEL Dorantes CNP   DD: 21       Praveena Goncalves Saud Mcnamara, APRN - CNP  08/11/21 6012

## 2021-08-12 NOTE — ED PROVIDER NOTES
Independent    HPI:  8/12/21, Time: 1:32 AM EDT         Maco Michael is a 76 y.o. male presenting to the ED for urinary frequency hesitancy. Patient reports symptoms ongoing the last several days, patient does report a past history of prostate cancer was seen by urology and states that he had treatment and has been cancer free since. .  Patient denies fevers denies any back or flank pain. Denies any Blood noted in the urine, patient otherwise without any abdominal pain he also denies any back or flank pain as well as no associated chest pain or shortness of breath. Patient reports that he just repeatedly having urinary frequency as well as hesitancy does report discomfort but reports no actual burning with urination. Denies any unusual discharge. Symptoms mild in severity but persistent. Review of Systems:   A complete review of systems was performed and pertinent positives and negatives are stated within HPI, all other systems reviewed and are negative.          --------------------------------------------- PAST HISTORY ---------------------------------------------  Past Medical History:  has a past medical history of Anxiety, Asthma, Bipolar disorder ([facility] Degenerative disc disease, lumbar, Depression, Diverticulosis, and Prostate cancer ([facility] Past Surgical History:  has a past surgical history that includes Cholecystectomy; hernia repair; and Colonoscopy (12/21/2004). Social History:  reports that he has never smoked. He has never used smokeless tobacco. He reports current alcohol use. He reports that he does not use drugs. Family History: family history includes Cancer (age of onset: 72) in his mother. The patients home medications have been reviewed. Allergies: Patient has no known allergies.     -------------------------------------------------- RESULTS -------------------------------------------------  All laboratory and radiology results have been personally reviewed by myself   LABS:  Results for orders placed or performed during the hospital encounter of 08/12/21   Urinalysis   Result Value Ref Range    Color, UA Yellow Straw/Yellow    Clarity, UA Clear Clear    Glucose, Ur Negative Negative mg/dL    Bilirubin Urine Negative Negative    Ketones, Urine Negative Negative mg/dL    Specific Gravity, UA 1.020 1.005 - 1.030    Blood, Urine TRACE-INTACT Negative    pH, UA 6.0 5.0 - 9.0    Protein, UA Negative Negative mg/dL    Urobilinogen, Urine 0.2 <2.0 E.U./dL    Nitrite, Urine Negative Negative    Leukocyte Esterase, Urine Negative Negative   CBC Auto Differential   Result Value Ref Range    WBC 5.2 4.5 - 11.5 E9/L    RBC 4.27 3.80 - 5.80 E12/L    Hemoglobin 13.9 12.5 - 16.5 g/dL    Hematocrit 40.4 37.0 - 54.0 %    MCV 94.6 80.0 - 99.9 fL    MCH 32.6 26.0 - 35.0 pg    MCHC 34.4 32.0 - 34.5 %    RDW 13.1 11.5 - 15.0 fL    Platelets 177 323 - 062 E9/L    MPV 9.8 7.0 - 12.0 fL    Neutrophils % 59.0 43.0 - 80.0 %    Immature Granulocytes % 0.4 0.0 - 5.0 %    Lymphocytes % 25.0 20.0 - 42.0 %    Monocytes % 10.2 2.0 - 12.0 %    Eosinophils % 4.8 0.0 - 6.0 %    Basophils % 0.6 0.0 - 2.0 %    Neutrophils Absolute 3.08 1.80 - 7.30 E9/L    Immature Granulocytes # 0.02 E9/L    Lymphocytes Absolute 1.30 (L) 1.50 - 4.00 E9/L    Monocytes Absolute 0.53 0.10 - 0.95 E9/L    Eosinophils Absolute 0.25 0.05 - 0.50 E9/L    Basophils Absolute 0.03 0.00 - 0.20 E9/L   Comprehensive Metabolic Panel   Result Value Ref Range    Sodium 139 132 - 146 mmol/L    Potassium 4.1 3.5 - 5.0 mmol/L    Chloride 104 98 - 107 mmol/L    CO2 26 22 - 29 mmol/L    Anion Gap 9 7 - 16 mmol/L    Glucose 94 74 - 99 mg/dL    BUN 17 6 - 23 mg/dL    CREATININE 0.8 0.7 - 1.2 mg/dL    GFR Non-African American >60 >=60 mL/min/1.73    GFR African American >60     Calcium 10.4 (H) 8.6 - 10.2 mg/dL    Total Protein 7.1 6.4 - 8.3 g/dL    Albumin 4.3 3.5 - 5.2 g/dL    Total Bilirubin <0.2 0.0 - 1.2 mg/dL    Alkaline Phosphatase 89 40 - 129 U/L    ALT 12 0 - 40 U/L    AST 17 0 - 39 U/L   Microscopic Urinalysis   Result Value Ref Range    WBC, UA 0-1 0 - 5 /HPF    RBC, UA 2-5 0 - 2 /HPF    Bacteria, UA RARE (A) None Seen /HPF       RADIOLOGY:  Interpreted by Radiologist.  No orders to display       ------------------------- NURSING NOTES AND VITALS REVIEWED ---------------------------   The nursing notes within the ED encounter and vital signs as below have been reviewed. BP (!) 156/81   Pulse 96   Temp 97.3 °F (36.3 °C)   Resp 12   Ht 5' 7\" (1.702 m)   Wt 158 lb (71.7 kg)   SpO2 94%   BMI 24.75 kg/m²   Oxygen Saturation Interpretation: Normal      ---------------------------------------------------PHYSICAL EXAM--------------------------------------      Constitutional/General: Alert and oriented x3, well appearing, non toxic in NAD  Head: Normocephalic and atraumatic  Eyes: PERRL, EOMI  Mouth: Oropharynx clear, handling secretions, no trismus  Neck: Supple, full ROM,   Pulmonary: Lungs clear to auscultation bilaterally, no wheezes, rales, or rhonchi. Not in respiratory distress  Cardiovascular:  Regular rate and rhythm, no murmurs, gallops, or rubs. 2+ distal pulses  Abdomen: Soft, non tender, non distended,   Extremities: Moves all extremities x 4. Warm and well perfused  Skin: warm and dry without rash  Neurologic: GCS 15,  Psych: Normal Affect      ------------------------------ ED COURSE/MEDICAL DECISION MAKING----------------------  Medications   cephALEXin (KEFLEX) capsule 500 mg (500 mg Oral Given 8/12/21 0141)         ED COURSE:       Medical Decision Making:    Plan will be for urinalysis, urinalysis is negative we will also obtain labs, CBC negative, chemistry panel all within normal limits, urine culture will be pending, patient with concerns for a cystitis but also BPH but with his history significant for prostate cancer I made him aware that he must follow-up with urology.   He will be started on Keflex for cystitis irritation, he does not have any concerning urinary tract infection although symptoms are concerning otherwise. Patient was made aware of good follow-up care with urology and that may be a candidate for Flomax but with his prostate cancer history I do not want a mask any concerning symptoms. Patient made aware of good follow-up care as well as when to return back to the emergency department. Patient expressed understanding and safely discharged home    Counseling: The emergency provider has spoken with the patient and discussed todays results, in addition to providing specific details for the plan of care and counseling regarding the diagnosis and prognosis. Questions are answered at this time and they are agreeable with the plan.      --------------------------------- IMPRESSION AND DISPOSITION ---------------------------------    IMPRESSION  1. Acute cystitis without hematuria    2. Benign prostatic hyperplasia with urinary frequency        DISPOSITION  Disposition: Discharge to home  Patient condition is good      NOTE: This report was transcribed using voice recognition software.  Every effort was made to ensure accuracy; however, inadvertent computerized transcription errors may be present     EMANUEL Harris CNP  08/12/21 0255

## 2021-08-14 LAB — URINE CULTURE, ROUTINE: NORMAL

## 2021-08-26 ENCOUNTER — OFFICE VISIT (OUTPATIENT)
Dept: PRIMARY CARE CLINIC | Age: 76
End: 2021-08-26
Payer: MEDICARE

## 2021-08-26 VITALS
WEIGHT: 162 LBS | HEIGHT: 67 IN | BODY MASS INDEX: 25.43 KG/M2 | TEMPERATURE: 97.2 F | SYSTOLIC BLOOD PRESSURE: 132 MMHG | OXYGEN SATURATION: 95 % | DIASTOLIC BLOOD PRESSURE: 80 MMHG | HEART RATE: 61 BPM

## 2021-08-26 DIAGNOSIS — F41.8 DEPRESSION WITH ANXIETY: ICD-10-CM

## 2021-08-26 DIAGNOSIS — Z00.00 ROUTINE GENERAL MEDICAL EXAMINATION AT A HEALTH CARE FACILITY: Primary | ICD-10-CM

## 2021-08-26 DIAGNOSIS — C61 PROSTATE CANCER (HCC): ICD-10-CM

## 2021-08-26 PROCEDURE — G0439 PPPS, SUBSEQ VISIT: HCPCS | Performed by: FAMILY MEDICINE

## 2021-08-26 RX ORDER — CLONAZEPAM 0.5 MG/1
TABLET ORAL
Qty: 270 TABLET | Refills: 0 | Status: SHIPPED
Start: 2021-08-26 | End: 2021-11-03 | Stop reason: SDUPTHER

## 2021-08-26 RX ORDER — ALBUTEROL SULFATE 90 UG/1
2 AEROSOL, METERED RESPIRATORY (INHALATION) EVERY 6 HOURS PRN
Qty: 1 INHALER | Refills: 1 | Status: SHIPPED
Start: 2021-08-26 | End: 2022-02-02 | Stop reason: SDUPTHER

## 2021-08-26 ASSESSMENT — PATIENT HEALTH QUESTIONNAIRE - PHQ9
1. LITTLE INTEREST OR PLEASURE IN DOING THINGS: 0
SUM OF ALL RESPONSES TO PHQ QUESTIONS 1-9: 0
SUM OF ALL RESPONSES TO PHQ9 QUESTIONS 1 & 2: 0
2. FEELING DOWN, DEPRESSED OR HOPELESS: 0
SUM OF ALL RESPONSES TO PHQ QUESTIONS 1-9: 0
SUM OF ALL RESPONSES TO PHQ QUESTIONS 1-9: 0

## 2021-08-26 ASSESSMENT — LIFESTYLE VARIABLES
HOW OFTEN DO YOU HAVE SIX OR MORE DRINKS ON ONE OCCASION: 0
HAS A RELATIVE, FRIEND, DOCTOR, OR ANOTHER HEALTH PROFESSIONAL EXPRESSED CONCERN ABOUT YOUR DRINKING OR SUGGESTED YOU CUT DOWN: 0
HOW MANY STANDARD DRINKS CONTAINING ALCOHOL DO YOU HAVE ON A TYPICAL DAY: 0
AUDIT-C TOTAL SCORE: 4
HAVE YOU OR SOMEONE ELSE BEEN INJURED AS A RESULT OF YOUR DRINKING: 0
HOW OFTEN DO YOU HAVE A DRINK CONTAINING ALCOHOL: 4
AUDIT TOTAL SCORE: 4
HOW OFTEN DURING THE LAST YEAR HAVE YOU NEEDED AN ALCOHOLIC DRINK FIRST THING IN THE MORNING TO GET YOURSELF GOING AFTER A NIGHT OF HEAVY DRINKING: 0
HOW OFTEN DURING THE LAST YEAR HAVE YOU FAILED TO DO WHAT WAS NORMALLY EXPECTED FROM YOU BECAUSE OF DRINKING: 0
HOW OFTEN DURING THE LAST YEAR HAVE YOU FOUND THAT YOU WERE NOT ABLE TO STOP DRINKING ONCE YOU HAD STARTED: 0
HOW OFTEN DURING THE LAST YEAR HAVE YOU HAD A FEELING OF GUILT OR REMORSE AFTER DRINKING: 0
HOW OFTEN DURING THE LAST YEAR HAVE YOU BEEN UNABLE TO REMEMBER WHAT HAPPENED THE NIGHT BEFORE BECAUSE YOU HAD BEEN DRINKING: 0

## 2021-08-26 NOTE — PROGRESS NOTES
Medicare Annual Wellness Visit  Name: Florinda Milian Date: 2021   MRN: [id number] Sex: Male   Age: 68 y.o. Ethnicity: Non- / Non    : 1945 Race: White (non-)      Linwood Davenport is here for [place] (annual)    Screenings for behavioral, psychosocial and functional/safety risks, and cognitive dysfunction are all negative except as indicated below. These results, as well as other patient data from the [address] [facility] [address] form, are documented in Flowsheets linked to this Encounter. No Known Allergies      Prior to Visit Medications    Medication Sig Taking?  Authorizing Provider   albuterol sulfate  (90 Base) MCG/ACT inhaler Inhale 2 puffs into the lungs every 6 hours as needed for Wheezing Yes Jude Carney DO   clonazePAM (KLONOPIN) 0.5 MG tablet TAKE 1 TABLET BY MOUTH THREE TIMES DAILY AS NEEDED FOR ANXIETY Yes Jude Carney DO   buPROPion (WELLBUTRIN) 75 MG tablet Take 150 mg by mouth 2 times daily Yes Historical Provider, MD   lamoTRIgine (LAMICTAL) 150 MG tablet Take 150 mg by mouth daily Yes Historical Provider, MD   fluticasone (FLONASE) 50 MCG/ACT nasal spray 2 sprays by Nasal route daily Yes Jude Carney DO   SYMBICORT 160-4.5 MCG/ACT AERO 2 puffs as needed  Yes Historical Provider, MD         Past Medical History:   Diagnosis Date    Anxiety     Asthma     Bipolar disorder ([facility]     Degenerative disc disease, lumbar 2020    Depression     Diverticulosis     Prostate cancer ([facility]        Past Surgical History:   Procedure Laterality Date    CHOLECYSTECTOMY      COLONOSCOPY  2004    HERNIA REPAIR           Family History   Problem Relation Age of Onset    Cancer Mother 72        bone       CareTeam (Including outside providers/suppliers regularly involved in providing care):   Patient Care Team:  Joanne Henriquez DO as PCP - General  Joanne Henriquez DO as PCP - [facility] Empaneled Provider    Wt Readings from Last 3 Encounters:   08/26/21 162 lb (73.5 kg)   08/11/21 158 lb (71.7 kg)   05/04/21 166 lb (75.3 kg)     Vitals:    08/26/21 0906   BP: 132/80   Pulse: 61   Temp: 97.2 °F (36.2 °C)   SpO2: 95%   Weight: 162 lb (73.5 kg)   Height: 5' 7\" (1.702 m)     Body mass index is 25.37 kg/m². Based upon direct observation of the patient, evaluation of cognition reveals recent and remote memory intact. General Appearance: alert and oriented to person, place and time, well developed and well- nourished, in no acute distress  Skin: warm and dry, no rash or erythema  Head: normocephalic and atraumatic  Eyes: pupils equal, round, and reactive to light, extraocular eye movements intact, conjunctivae normal  ENT: tympanic membrane, external ear and ear canal normal bilaterally, nose without deformity, nasal mucosa and turbinates normal without polyps  Neck: supple and non-tender without mass, no thyromegaly or thyroid nodules, no cervical lymphadenopathy  Pulmonary/Chest: clear to auscultation bilaterally- no wheezes, rales or rhonchi, normal air movement, no respiratory distress  Cardiovascular: normal rate, regular rhythm, normal S1 and S2, no murmurs, rubs, clicks, or gallops, distal pulses intact, no carotid bruits  Abdomen: soft, non-tender, non-distended, normal bowel sounds, no masses or organomegaly  Extremities: no cyanosis, clubbing or edema  Musculoskeletal: normal range of motion, no joint swelling, deformity or tenderness  Neurologic: reflexes normal and symmetric, no cranial nerve deficit, gait, coordination and speech normal    Patient's complete Health Risk Assessment and screening values have been reviewed and are found in Flowsheets. The following problems were reviewed today and where indicated follow up appointments were made and/or referrals ordered.     Positive Risk Factor Screenings with Interventions:            General Health and ACP:  General  In general, how would you say your health is?: Good  In the past 7 days, have you experienced any of the following? New or Increased Pain, New or Increased Fatigue, Loneliness, Social Isolation, Stress or Anger?: None of These  Do you get the social and emotional support that you need?: Yes  Do you have a Living Will?: Yes  Advance Directives     Power of  Living Will ACP-Advance Directive ACP-Power of     Not on File Not on File Not on File Not on File      General Health Risk Interventions:  ·         Personalized Preventive Plan   Current Health Maintenance Status  Immunization History   Administered Date(s) Administered    COVID-19, Jorge Peter, PF, 30mcg/0.3mL 01/30/2021, 02/20/2021    Pneumococcal Conjugate 13-valent ([id number]) 01/26/2018    Pneumococcal Polysaccharide ([id number]) 02/22/2016        Health Maintenance   Topic Date Due    Hepatitis C screen  Never done    DTaP/Tdap/Td vaccine (1 - Tdap) 04/02/2005    Shingles Vaccine (2 of 3) 12/20/2017    Annual Wellness Visit (AWV)  Never done    Flu vaccine (1) 09/01/2021    PSA counseling  04/20/2022    Pneumococcal 65+ years Vaccine  Completed    COVID-19 Vaccine  Completed    Hepatitis A vaccine  Aged Out    Hepatitis B vaccine  Aged Out    Hib vaccine  Aged Out    Meningococcal (ACWY) vaccine  Aged Out     Recommendations for [place] Due: see orders and patient instructions/AVS.  . Recommended screening schedule for the next 5-10 years is provided to the patient in written form: see Patient Instructions/AVS.    Saleem Joyner was seen today for medicare awv. Diagnoses and all orders for this visit:    Routine general medical examination at a health care facility    Depression with anxiety  -     clonazePAM (KLONOPIN) 0.5 MG tablet; TAKE 1 TABLET BY MOUTH THREE TIMES DAILY AS NEEDED FOR ANXIETY    Prostate cancer ([address]    Other orders  -     albuterol sulfate  (90 Base) MCG/ACT inhaler;  Inhale 2 puffs into the lungs every 6 hours as needed for Wheezing                  Perry Mcguire, a male of 68 y.o. came to the office 8/26/2021. Patient Active Problem List   Diagnosis    Depression with anxiety    Mild intermittent asthma without complication    Prostate cancer ([facility]          HPI depression/anxiety: doing well regarding his moods. Walking 7 miles 3 times a wk.     prostate cancer: recent psa 0.1    No Known Allergies    Current Outpatient Medications on File Prior to Visit   Medication Sig Dispense Refill    buPROPion (WELLBUTRIN) 75 MG tablet Take 150 mg by mouth 2 times daily      lamoTRIgine (LAMICTAL) 150 MG tablet Take 150 mg by mouth daily      fluticasone (FLONASE) 50 MCG/ACT nasal spray 2 sprays by Nasal route daily 1 Bottle 3    SYMBICORT 160-4.5 MCG/ACT AERO 2 puffs as needed        No current facility-administered medications on file prior to visit. Review of Systems   Constitutional: Negative for activity change, appetite change and fatigue. Psychiatric/Behavioral: Negative for agitation, decreased concentration, dysphoric mood and sleep disturbance. The patient is not nervous/anxious. other review of systems reviewed and are negative    OBJECTIVE:  /80   Pulse 61   Temp 97.2 °F (36.2 °C)   Ht 5' 7\" (1.702 m)   Wt 162 lb (73.5 kg)   SpO2 95%   BMI 25.37 kg/m²      Physical Exam see above    ASSESSMENT AND PLAN:    Becky Miller was seen today for medicare aw. Diagnoses and all orders for this visit:    Routine general medical examination at a health care facility    Depression with anxiety  -     clonazePAM (KLONOPIN) 0.5 MG tablet; TAKE 1 TABLET BY MOUTH THREE TIMES DAILY AS NEEDED FOR ANXIETY    Prostate cancer ([facility]    Other orders  -     albuterol sulfate  (90 Base) MCG/ACT inhaler;  Inhale 2 puffs into the lungs every 6 hours as needed for Wheezing    - continue care with urologist  - continue meds for depression and anxiety    Return in 3 months (on 11/26/2021) for Medicare Annual Wellness Visit in 1 year, or for acute problem.     Morena Carney, DO

## 2021-08-26 NOTE — PATIENT INSTRUCTIONS
Personalized Preventive Plan for Ana Beverly - 8/26/2021  Medicare offers a range of preventive health benefits. Some of the tests and screenings are paid in full while other may be subject to a deductible, co-insurance, and/or copay. Some of these benefits include a comprehensive review of your medical history including lifestyle, illnesses that may run in your family, and various assessments and screenings as appropriate. After reviewing your medical record and screening and assessments performed today your provider may have ordered immunizations, labs, imaging, and/or referrals for you. A list of these orders (if applicable) as well as your Preventive Care list are included within your After Visit Summary for your review. Other Preventive Recommendations:    · A preventive eye exam performed by an eye specialist is recommended every 1-2 years to screen for glaucoma; cataracts, macular degeneration, and other eye disorders. · A preventive dental visit is recommended every 6 months. · Try to get at least 150 minutes of exercise per week or 10,000 steps per day on a pedometer . · Order or download the FREE \"Exercise & Physical Activity: Your Everyday Guide\" from [employer]. Call 1-697.495.9276 or search [employer] online. · You need 5948-0424 mg of calcium and 8997-5169 IU of vitamin D per day. It is possible to meet your calcium requirement with diet alone, but a vitamin D supplement is usually necessary to meet this goal.  · When exposed to the sun, use a sunscreen that protects against both UVA and UVB radiation with an SPF of 30 or greater. Reapply every 2 to 3 hours or after sweating, drying off with a towel, or swimming. · Always wear a seat belt when traveling in a car. Always wear a helmet when riding a bicycle or motorcycle.

## 2021-11-03 ENCOUNTER — OFFICE VISIT (OUTPATIENT)
Dept: PRIMARY CARE CLINIC | Age: 76
End: 2021-11-03
Payer: MEDICARE

## 2021-11-03 VITALS
DIASTOLIC BLOOD PRESSURE: 80 MMHG | OXYGEN SATURATION: 94 % | TEMPERATURE: 97.2 F | HEART RATE: 64 BPM | WEIGHT: 171.5 LBS | BODY MASS INDEX: 26.86 KG/M2 | SYSTOLIC BLOOD PRESSURE: 130 MMHG

## 2021-11-03 DIAGNOSIS — F41.8 DEPRESSION WITH ANXIETY: ICD-10-CM

## 2021-11-03 PROCEDURE — 99213 OFFICE O/P EST LOW 20 MIN: CPT | Performed by: FAMILY MEDICINE

## 2021-11-03 RX ORDER — CLONAZEPAM 0.5 MG/1
TABLET ORAL
Qty: 270 TABLET | Refills: 0 | Status: SHIPPED
Start: 2021-11-03 | End: 2022-02-02 | Stop reason: SDUPTHER

## 2021-11-03 NOTE — PROGRESS NOTES
Nico Figueroa, a male of 68 y.o. came to the office 11/3/2021. Patient Active Problem List   Diagnosis    Depression with anxiety    Mild intermittent asthma without complication    Prostate cancer ([facility]          HPI depression/anxiety: doing well on meds. Moods are good. Goes to [address] [facility] as well. No Known Allergies    Current Outpatient Medications on File Prior to Visit   Medication Sig Dispense Refill    albuterol sulfate  (90 Base) MCG/ACT inhaler Inhale 2 puffs into the lungs every 6 hours as needed for Wheezing 1 Inhaler 1    buPROPion (WELLBUTRIN) 75 MG tablet Take 150 mg by mouth 2 times daily      lamoTRIgine (LAMICTAL) 150 MG tablet Take 150 mg by mouth daily      fluticasone (FLONASE) 50 MCG/ACT nasal spray 2 sprays by Nasal route daily 1 Bottle 3    SYMBICORT 160-4.5 MCG/ACT AERO 2 puffs as needed        No current facility-administered medications on file prior to visit. Review of Systems   Constitutional: Negative for activity change, appetite change and fatigue. Psychiatric/Behavioral: Negative for agitation, decreased concentration, dysphoric mood and sleep disturbance. The patient is not nervous/anxious. other review of systems reviewed and are negative    OBJECTIVE:  /80   Pulse 64   Temp 97.2 °F (36.2 °C)   Wt 171 lb 8 oz (77.8 kg)   SpO2 94%   BMI 26.86 kg/m²      Physical Exam  Constitutional:       General: He is not in acute distress. Eyes:      General: No scleral icterus. Conjunctiva/sclera: Conjunctivae normal.   Neck:      Thyroid: No thyromegaly. Cardiovascular:      Rate and Rhythm: Normal rate and regular rhythm. Heart sounds: No murmur heard. Pulmonary:      Effort: Pulmonary effort is normal.      Breath sounds: Normal breath sounds. Musculoskeletal:      Cervical back: Neck supple. Lymphadenopathy:      Cervical: No cervical adenopathy. Skin:     General: Skin is warm and dry.    Neurological:      Mental Status: He is alert and oriented to person, place, and time. wt up 13 lbs. ASSESSMENT AND PLAN:    Daniella Nation was seen today for anxiety. Diagnoses and all orders for this visit:    Depression with anxiety  -     clonazePAM (KLONOPIN) 0.5 MG tablet; TAKE 1 TABLET BY MOUTH THREE TIMES DAILY AS NEEDED FOR ANXIETY    - continue current dose of med and [facility] ov's. - continue exercise   - encourage diet for wt loss. Return in about 3 months (around 2/3/2022) for or for acute problem.     Neal Carney, DO

## 2021-12-14 ENCOUNTER — APPOINTMENT (OUTPATIENT)
Dept: GENERAL RADIOLOGY | Age: 76
End: 2021-12-14
Payer: MEDICARE

## 2021-12-14 ENCOUNTER — APPOINTMENT (OUTPATIENT)
Dept: CT IMAGING | Age: 76
End: 2021-12-14
Payer: MEDICARE

## 2021-12-14 ENCOUNTER — HOSPITAL ENCOUNTER (OUTPATIENT)
Age: 76
Setting detail: OBSERVATION
Discharge: HOME | End: 2021-12-15
Attending: EMERGENCY MEDICINE | Admitting: INTERNAL MEDICINE
Payer: MEDICARE

## 2021-12-14 DIAGNOSIS — J44.1 COPD EXACERBATION (HCC): ICD-10-CM

## 2021-12-14 DIAGNOSIS — R06.02 SHORTNESS OF BREATH: Primary | ICD-10-CM

## 2021-12-14 LAB
ALBUMIN SERPL-MCNC: 4.4 G/DL (ref 3.5–5.2)
ALP BLD-CCNC: 94 U/L (ref 40–129)
ALT SERPL-CCNC: 15 U/L (ref 0–40)
ANION GAP SERPL CALCULATED.3IONS-SCNC: 12 MMOL/L (ref 7–16)
AST SERPL-CCNC: 18 U/L (ref 0–39)
BASOPHILS ABSOLUTE: 0.05 E9/L (ref 0–0.2)
BASOPHILS RELATIVE PERCENT: 0.8 % (ref 0–2)
BILIRUB SERPL-MCNC: 0.6 MG/DL (ref 0–1.2)
BUN BLDV-MCNC: 20 MG/DL (ref 6–23)
CALCIUM SERPL-MCNC: 10.4 MG/DL (ref 8.6–10.2)
CHLORIDE BLD-SCNC: 107 MMOL/L (ref 98–107)
CO2: 23 MMOL/L (ref 22–29)
CREAT SERPL-MCNC: 0.9 MG/DL (ref 0.7–1.2)
EOSINOPHILS ABSOLUTE: 0.58 E9/L (ref 0.05–0.5)
EOSINOPHILS RELATIVE PERCENT: 9 % (ref 0–6)
GFR AFRICAN AMERICAN: >60
GFR NON-AFRICAN AMERICAN: >60 ML/MIN/1.73
GLUCOSE BLD-MCNC: 119 MG/DL (ref 74–99)
HCT VFR BLD CALC: 46.3 % (ref 37–54)
HEMOGLOBIN: 15.5 G/DL (ref 12.5–16.5)
IMMATURE GRANULOCYTES #: 0.02 E9/L
IMMATURE GRANULOCYTES %: 0.3 % (ref 0–5)
LYMPHOCYTES ABSOLUTE: 1.93 E9/L (ref 1.5–4)
LYMPHOCYTES RELATIVE PERCENT: 30 % (ref 20–42)
MCH RBC QN AUTO: 33 PG (ref 26–35)
MCHC RBC AUTO-ENTMCNC: 33.5 % (ref 32–34.5)
MCV RBC AUTO: 98.7 FL (ref 80–99.9)
MONOCYTES ABSOLUTE: 0.73 E9/L (ref 0.1–0.95)
MONOCYTES RELATIVE PERCENT: 11.3 % (ref 2–12)
NEUTROPHILS ABSOLUTE: 3.13 E9/L (ref 1.8–7.3)
NEUTROPHILS RELATIVE PERCENT: 48.6 % (ref 43–80)
PDW BLD-RTO: 13.2 FL (ref 11.5–15)
PLATELET # BLD: 173 E9/L (ref 130–450)
PMV BLD AUTO: 10.4 FL (ref 7–12)
POTASSIUM SERPL-SCNC: 3.9 MMOL/L (ref 3.5–5)
PRO-BNP: 1690 PG/ML (ref 0–450)
RBC # BLD: 4.69 E12/L (ref 3.8–5.8)
REASON FOR REJECTION: NORMAL
REJECTED TEST: NORMAL
SODIUM BLD-SCNC: 142 MMOL/L (ref 132–146)
TOTAL PROTEIN: 6.8 G/DL (ref 6.4–8.3)
TROPONIN, HIGH SENSITIVITY: 19 NG/L (ref 0–11)
TROPONIN, HIGH SENSITIVITY: 22 NG/L (ref 0–11)
WBC # BLD: 6.4 E9/L (ref 4.5–11.5)

## 2021-12-14 PROCEDURE — 71045 X-RAY EXAM CHEST 1 VIEW: CPT

## 2021-12-14 PROCEDURE — 96375 TX/PRO/DX INJ NEW DRUG ADDON: CPT

## 2021-12-14 PROCEDURE — 6370000000 HC RX 637 (ALT 250 FOR IP): Performed by: PHYSICIAN ASSISTANT

## 2021-12-14 PROCEDURE — 80053 COMPREHEN METABOLIC PANEL: CPT

## 2021-12-14 PROCEDURE — 71275 CT ANGIOGRAPHY CHEST: CPT

## 2021-12-14 PROCEDURE — 99283 EMERGENCY DEPT VISIT LOW MDM: CPT

## 2021-12-14 PROCEDURE — 6360000004 HC RX CONTRAST MEDICATION: Performed by: RADIOLOGY

## 2021-12-14 PROCEDURE — 96365 THER/PROPH/DIAG IV INF INIT: CPT

## 2021-12-14 PROCEDURE — 93010 ELECTROCARDIOGRAM REPORT: CPT | Performed by: INTERNAL MEDICINE

## 2021-12-14 PROCEDURE — 85025 COMPLETE CBC W/AUTO DIFF WBC: CPT

## 2021-12-14 PROCEDURE — 93005 ELECTROCARDIOGRAM TRACING: CPT | Performed by: PHYSICIAN ASSISTANT

## 2021-12-14 PROCEDURE — 6370000000 HC RX 637 (ALT 250 FOR IP): Performed by: INTERNAL MEDICINE

## 2021-12-14 PROCEDURE — 83880 ASSAY OF NATRIURETIC PEPTIDE: CPT

## 2021-12-14 PROCEDURE — 6360000002 HC RX W HCPCS: Performed by: PHYSICIAN ASSISTANT

## 2021-12-14 PROCEDURE — G0378 HOSPITAL OBSERVATION PER HR: HCPCS

## 2021-12-14 PROCEDURE — 94640 AIRWAY INHALATION TREATMENT: CPT

## 2021-12-14 PROCEDURE — 96366 THER/PROPH/DIAG IV INF ADDON: CPT

## 2021-12-14 PROCEDURE — 84484 ASSAY OF TROPONIN QUANT: CPT

## 2021-12-14 RX ORDER — SODIUM CHLORIDE 0.9 % (FLUSH) 0.9 %
5-40 SYRINGE (ML) INJECTION PRN
Status: DISCONTINUED | OUTPATIENT
Start: 2021-12-14 | End: 2021-12-15 | Stop reason: HOSPADM

## 2021-12-14 RX ORDER — DOXYCYCLINE HYCLATE 100 MG/1
100 CAPSULE ORAL EVERY 12 HOURS SCHEDULED
Status: DISCONTINUED | OUTPATIENT
Start: 2021-12-14 | End: 2021-12-15 | Stop reason: HOSPADM

## 2021-12-14 RX ORDER — METHYLPREDNISOLONE SODIUM SUCCINATE 125 MG/2ML
125 INJECTION, POWDER, LYOPHILIZED, FOR SOLUTION INTRAMUSCULAR; INTRAVENOUS ONCE
Status: COMPLETED | OUTPATIENT
Start: 2021-12-14 | End: 2021-12-14

## 2021-12-14 RX ORDER — BUDESONIDE AND FORMOTEROL FUMARATE DIHYDRATE 160; 4.5 UG/1; UG/1
2 AEROSOL RESPIRATORY (INHALATION) 2 TIMES DAILY
Status: DISCONTINUED | OUTPATIENT
Start: 2021-12-14 | End: 2021-12-14 | Stop reason: CLARIF

## 2021-12-14 RX ORDER — ONDANSETRON 2 MG/ML
4 INJECTION INTRAMUSCULAR; INTRAVENOUS EVERY 6 HOURS PRN
Status: DISCONTINUED | OUTPATIENT
Start: 2021-12-14 | End: 2021-12-15 | Stop reason: HOSPADM

## 2021-12-14 RX ORDER — POLYETHYLENE GLYCOL 3350 17 G/17G
17 POWDER, FOR SOLUTION ORAL DAILY PRN
Status: DISCONTINUED | OUTPATIENT
Start: 2021-12-14 | End: 2021-12-15 | Stop reason: HOSPADM

## 2021-12-14 RX ORDER — PREDNISONE 20 MG/1
40 TABLET ORAL DAILY
Status: DISCONTINUED | OUTPATIENT
Start: 2021-12-15 | End: 2021-12-15 | Stop reason: HOSPADM

## 2021-12-14 RX ORDER — ACETAMINOPHEN 325 MG/1
650 TABLET ORAL EVERY 6 HOURS PRN
Status: DISCONTINUED | OUTPATIENT
Start: 2021-12-14 | End: 2021-12-15 | Stop reason: HOSPADM

## 2021-12-14 RX ORDER — SODIUM CHLORIDE 9 MG/ML
25 INJECTION, SOLUTION INTRAVENOUS PRN
Status: DISCONTINUED | OUTPATIENT
Start: 2021-12-14 | End: 2021-12-15 | Stop reason: HOSPADM

## 2021-12-14 RX ORDER — ONDANSETRON 4 MG/1
4 TABLET, ORALLY DISINTEGRATING ORAL EVERY 8 HOURS PRN
Status: DISCONTINUED | OUTPATIENT
Start: 2021-12-14 | End: 2021-12-15 | Stop reason: HOSPADM

## 2021-12-14 RX ORDER — BUPROPION HYDROCHLORIDE 75 MG/1
150 TABLET ORAL 2 TIMES DAILY
Status: DISCONTINUED | OUTPATIENT
Start: 2021-12-14 | End: 2021-12-15 | Stop reason: HOSPADM

## 2021-12-14 RX ORDER — ARFORMOTEROL TARTRATE 15 UG/2ML
15 SOLUTION RESPIRATORY (INHALATION) 2 TIMES DAILY
Status: DISCONTINUED | OUTPATIENT
Start: 2021-12-14 | End: 2021-12-15 | Stop reason: HOSPADM

## 2021-12-14 RX ORDER — BUDESONIDE 0.5 MG/2ML
0.5 INHALANT ORAL 2 TIMES DAILY
Status: DISCONTINUED | OUTPATIENT
Start: 2021-12-14 | End: 2021-12-15 | Stop reason: HOSPADM

## 2021-12-14 RX ORDER — CLONAZEPAM 0.5 MG/1
0.5 TABLET ORAL 2 TIMES DAILY PRN
Status: DISCONTINUED | OUTPATIENT
Start: 2021-12-14 | End: 2021-12-15 | Stop reason: HOSPADM

## 2021-12-14 RX ORDER — FLUTICASONE PROPIONATE 50 MCG
2 SPRAY, SUSPENSION (ML) NASAL DAILY
Status: DISCONTINUED | OUTPATIENT
Start: 2021-12-14 | End: 2021-12-15 | Stop reason: HOSPADM

## 2021-12-14 RX ORDER — IPRATROPIUM BROMIDE AND ALBUTEROL SULFATE 2.5; .5 MG/3ML; MG/3ML
1 SOLUTION RESPIRATORY (INHALATION)
Status: COMPLETED | OUTPATIENT
Start: 2021-12-14 | End: 2021-12-14

## 2021-12-14 RX ORDER — SODIUM CHLORIDE 0.9 % (FLUSH) 0.9 %
10 SYRINGE (ML) INJECTION PRN
Status: DISCONTINUED | OUTPATIENT
Start: 2021-12-14 | End: 2021-12-14 | Stop reason: SDUPTHER

## 2021-12-14 RX ORDER — IPRATROPIUM BROMIDE AND ALBUTEROL SULFATE 2.5; .5 MG/3ML; MG/3ML
1 SOLUTION RESPIRATORY (INHALATION)
Status: DISCONTINUED | OUTPATIENT
Start: 2021-12-15 | End: 2021-12-15 | Stop reason: HOSPADM

## 2021-12-14 RX ORDER — ACETAMINOPHEN 650 MG/1
650 SUPPOSITORY RECTAL EVERY 6 HOURS PRN
Status: DISCONTINUED | OUTPATIENT
Start: 2021-12-14 | End: 2021-12-15 | Stop reason: HOSPADM

## 2021-12-14 RX ORDER — MAGNESIUM SULFATE IN WATER 40 MG/ML
2000 INJECTION, SOLUTION INTRAVENOUS ONCE
Status: COMPLETED | OUTPATIENT
Start: 2021-12-14 | End: 2021-12-14

## 2021-12-14 RX ORDER — SODIUM CHLORIDE 0.9 % (FLUSH) 0.9 %
5-40 SYRINGE (ML) INJECTION EVERY 12 HOURS SCHEDULED
Status: DISCONTINUED | OUTPATIENT
Start: 2021-12-14 | End: 2021-12-15 | Stop reason: HOSPADM

## 2021-12-14 RX ADMIN — IPRATROPIUM BROMIDE AND ALBUTEROL SULFATE 1 AMPULE: .5; 2.5 SOLUTION RESPIRATORY (INHALATION) at 10:10

## 2021-12-14 RX ADMIN — BUPROPION HYDROCHLORIDE 150 MG: 75 TABLET, FILM COATED ORAL at 23:56

## 2021-12-14 RX ADMIN — IPRATROPIUM BROMIDE AND ALBUTEROL SULFATE 1 AMPULE: .5; 2.5 SOLUTION RESPIRATORY (INHALATION) at 09:40

## 2021-12-14 RX ADMIN — LAMOTRIGINE 150 MG: 100 TABLET ORAL at 23:56

## 2021-12-14 RX ADMIN — MAGNESIUM SULFATE HEPTAHYDRATE 2000 MG: 40 INJECTION, SOLUTION INTRAVENOUS at 09:47

## 2021-12-14 RX ADMIN — IOPAMIDOL 60 ML: 755 INJECTION, SOLUTION INTRAVENOUS at 16:02

## 2021-12-14 RX ADMIN — IPRATROPIUM BROMIDE AND ALBUTEROL SULFATE 1 AMPULE: .5; 2.5 SOLUTION RESPIRATORY (INHALATION) at 09:55

## 2021-12-14 RX ADMIN — METHYLPREDNISOLONE SODIUM SUCCINATE 125 MG: 125 INJECTION, POWDER, FOR SOLUTION INTRAMUSCULAR; INTRAVENOUS at 09:46

## 2021-12-14 RX ADMIN — DOXYCYCLINE HYCLATE 100 MG: 100 CAPSULE ORAL at 20:37

## 2021-12-14 ASSESSMENT — ENCOUNTER SYMPTOMS
SHORTNESS OF BREATH: 1
EYE PAIN: 0
SORE THROAT: 0
NAUSEA: 0
EYE DISCHARGE: 0
COUGH: 1
ABDOMINAL PAIN: 0
BACK PAIN: 0
SINUS PRESSURE: 0
DIARRHEA: 0
WHEEZING: 1
VOMITING: 0

## 2021-12-14 NOTE — ED PROVIDER NOTES
77-year-old male presented emergency department for shortness of breath, states this started last night, was progressively worsening, was improving some time with his inhalers with postop working, associate with a dry cough, severe in severity, constant. He denies any fevers, chills, nausea, vomiting, abdominal pain, chest pain, diarrhea. States he does not follow with a pulmonologist.           Review of Systems   Constitutional: Negative for chills and fever. HENT: Negative for ear pain, sinus pressure and sore throat. Eyes: Negative for pain and discharge. Respiratory: Positive for cough, shortness of breath and wheezing. Cardiovascular: Negative for chest pain. Gastrointestinal: Negative for abdominal pain, diarrhea, nausea and vomiting. Genitourinary: Negative for dysuria and frequency. Musculoskeletal: Negative for arthralgias and back pain. Skin: Negative for rash and wound. Neurological: Negative for weakness and headaches. Hematological: Negative for adenopathy. All other systems reviewed and are negative. Physical Exam  Vitals and nursing note reviewed. Constitutional:       Appearance: He is well-developed. HENT:      Head: Normocephalic and atraumatic. Right Ear: External ear normal.      Left Ear: External ear normal.      Nose: Nose normal.      Mouth/Throat:      Mouth: Mucous membranes are moist.   Eyes:      Extraocular Movements: Extraocular movements intact. Conjunctiva/sclera: Conjunctivae normal.      Pupils: Pupils are equal, round, and reactive to light. Cardiovascular:      Rate and Rhythm: Regular rhythm. Tachycardia present. Heart sounds: Normal heart sounds. No murmur heard. Pulmonary:      Effort: Respiratory distress present. Breath sounds: Wheezing present. No rales.       Comments: Very diminished throughout, expiratory wheezes present, tachypneic  Abdominal:      General: Bowel sounds are normal.      Palpations: Abdomen is soft. Tenderness: There is no abdominal tenderness. There is no guarding or rebound. Musculoskeletal:         General: No tenderness or deformity. Cervical back: Normal range of motion and neck supple. Skin:     General: Skin is warm and dry. Neurological:      Mental Status: He is alert and oriented to person, place, and time. Cranial Nerves: No cranial nerve deficit. Coordination: Coordination normal.          Procedures     MDM     ED Course as of 12/15/21 0618   Tue Dec 14, 2021   0935 Contacted respiratory for patient respiratory distress [JG]   1006 EKG: This EKG is signed by emergency department physician. Rate: 118  Rhythm: Wide-complex tachycardia  Interpretation: Right bundle branch block, left anterior fascicular block, bifascicular block, wide QRS rhythm  Comparison: changes compared to previous EKG      [JG]   1401 Discontinue patient's oxygen, his aeration is significantly improved [JG]   1419 94% on room air, will ambulate patient off oxygen [JG]   1445 Remains persistently tachycardic despite improvement of his oxygenation after sitting up in bed and being ambulated, will obtain CTA to rule out PE [JG]   1844 Consulted with Dr. Parveen Solis, hospitalist, who accepted for admission. [KG]      ED Course User Index  [JG] Shaunna Ann MD  [KG] Madisyn Agrawal,       ED Course as of 12/15/21 0618   Tue Dec 14, 2021   0935 Contacted respiratory for patient respiratory distress [JG]   1006 EKG: This EKG is signed by emergency department physician.     Rate: 118  Rhythm: Wide-complex tachycardia  Interpretation: Right bundle branch block, left anterior fascicular block, bifascicular block, wide QRS rhythm  Comparison: changes compared to previous EKG      [JG]   1401 Discontinue patient's oxygen, his aeration is significantly improved [JG]   1419 94% on room air, will ambulate patient off oxygen [JG]   1445 Remains persistently tachycardic despite improvement of his 26.0 - 35.0 pg    MCHC 33.5 32.0 - 34.5 %    RDW 13.2 11.5 - 15.0 fL    Platelets 977 272 - 660 E9/L    MPV 10.4 7.0 - 12.0 fL    Neutrophils % 48.6 43.0 - 80.0 %    Immature Granulocytes % 0.3 0.0 - 5.0 %    Lymphocytes % 30.0 20.0 - 42.0 %    Monocytes % 11.3 2.0 - 12.0 %    Eosinophils % 9.0 (H) 0.0 - 6.0 %    Basophils % 0.8 0.0 - 2.0 %    Neutrophils Absolute 3.13 1.80 - 7.30 E9/L    Immature Granulocytes # 0.02 E9/L    Lymphocytes Absolute 1.93 1.50 - 4.00 E9/L    Monocytes Absolute 0.73 0.10 - 0.95 E9/L    Eosinophils Absolute 0.58 (H) 0.05 - 0.50 E9/L    Basophils Absolute 0.05 0.00 - 0.20 E9/L   SPECIMEN REJECTION   Result Value Ref Range    Rejected Test trop bmp bnp     Reason for Rejection see below    Comprehensive metabolic panel   Result Value Ref Range    Sodium 142 132 - 146 mmol/L    Potassium 3.9 3.5 - 5.0 mmol/L    Chloride 107 98 - 107 mmol/L    CO2 23 22 - 29 mmol/L    Anion Gap 12 7 - 16 mmol/L    Glucose 119 (H) 74 - 99 mg/dL    BUN 20 6 - 23 mg/dL    CREATININE 0.9 0.7 - 1.2 mg/dL    GFR Non-African American >60 >=60 mL/min/1.73    GFR African American >60     Calcium 10.4 (H) 8.6 - 10.2 mg/dL    Total Protein 6.8 6.4 - 8.3 g/dL    Albumin 4.4 3.5 - 5.2 g/dL    Total Bilirubin 0.6 0.0 - 1.2 mg/dL    Alkaline Phosphatase 94 40 - 129 U/L    ALT 15 0 - 40 U/L    AST 18 0 - 39 U/L   Troponin   Result Value Ref Range    Troponin, High Sensitivity 22 (H) 0 - 11 ng/L   Brain Natriuretic Peptide   Result Value Ref Range    Pro-BNP 1,690 (H) 0 - 450 pg/mL   Troponin   Result Value Ref Range    Troponin, High Sensitivity 19 (H) 0 - 11 ng/L   Comprehensive Metabolic Panel w/ Reflex to MG   Result Value Ref Range    Sodium 141 132 - 146 mmol/L    Potassium reflex Magnesium 4.1 3.5 - 5.0 mmol/L    Chloride 106 98 - 107 mmol/L    CO2 24 22 - 29 mmol/L    Anion Gap 11 7 - 16 mmol/L    Glucose 100 (H) 74 - 99 mg/dL    BUN 25 (H) 6 - 23 mg/dL    CREATININE 0.8 0.7 - 1.2 mg/dL    GFR Non-African American >60 >=60 mL/min/1.73    GFR African American >60     Calcium 10.3 (H) 8.6 - 10.2 mg/dL    Total Protein 6.5 6.4 - 8.3 g/dL    Albumin 4.0 3.5 - 5.2 g/dL    Total Bilirubin 0.5 0.0 - 1.2 mg/dL    Alkaline Phosphatase 86 40 - 129 U/L    ALT 13 0 - 40 U/L    AST 18 0 - 39 U/L   CBC auto differential   Result Value Ref Range    WBC 6.4 4.5 - 11.5 E9/L    RBC 4.23 3.80 - 5.80 E12/L    Hemoglobin 13.6 12.5 - 16.5 g/dL    Hematocrit 40.1 37.0 - 54.0 %    MCV 94.8 80.0 - 99.9 fL    MCH 32.2 26.0 - 35.0 pg    MCHC 33.9 32.0 - 34.5 %    RDW 13.5 11.5 - 15.0 fL    Platelets 817 845 - 678 E9/L    MPV 10.0 7.0 - 12.0 fL    Neutrophils % 70.4 43.0 - 80.0 %    Immature Granulocytes % 0.3 0.0 - 5.0 %    Lymphocytes % 17.4 (L) 20.0 - 42.0 %    Monocytes % 11.3 2.0 - 12.0 %    Eosinophils % 0.3 0.0 - 6.0 %    Basophils % 0.3 0.0 - 2.0 %    Neutrophils Absolute 4.49 1.80 - 7.30 E9/L    Immature Granulocytes # 0.02 E9/L    Lymphocytes Absolute 1.11 (L) 1.50 - 4.00 E9/L    Monocytes Absolute 0.72 0.10 - 0.95 E9/L    Eosinophils Absolute 0.02 (L) 0.05 - 0.50 E9/L    Basophils Absolute 0.02 0.00 - 0.20 E9/L   EKG 12 Lead   Result Value Ref Range    Ventricular Rate 118 BPM    Atrial Rate 54 BPM    QRS Duration 144 ms    Q-T Interval 320 ms    QTc Calculation (Bazett) 448 ms    R Axis -52 degrees    T Axis -57 degrees       RADIOLOGY:  CTA PULMONARY W CONTRAST   Final Result   No evidence of pulmonary embolism or acute pulmonary abnormality. Hyper aerated lungs, suspect COPD.       Partially visualized left hydronephrosis and questionable right renal cyst.      RECOMMENDATIONS:   Unavailable         XR CHEST PORTABLE   Final Result   No acute pulmonary infiltrate is identified         XR CHEST (2 VW)    (Results Pending)         ------------------------- NURSING NOTES AND VITALS REVIEWED ---------------------------  Date / Time Roomed:  12/14/2021  9:30 AM  ED Bed Assignment:  [id number]    The nursing notes within the ED encounter and vital signs as below have been reviewed. Patient Vitals for the past 24 hrs:   BP Temp Temp src Pulse Resp SpO2 Weight   12/15/21 0418 (!) 107/52 97.6 °F (36.4 °C) Temporal 67 18 91 % --   12/15/21 0159 -- -- -- -- -- -- 160 lb 3.2 oz (72.7 kg)   12/15/21 0157 (!) 147/75 97.3 °F (36.3 °C) Temporal 70 18 97 % --   12/15/21 0118 -- -- -- 97 21 90 % --   12/14/21 2359 126/68 98.5 °F (36.9 °C) -- 87 19 97 % --   12/14/21 2046 -- -- -- -- -- -- 160 lb (72.6 kg)   12/14/21 2043 (!) 131/94 -- -- 94 22 93 % --   12/14/21 1416 -- -- -- 87 28 94 % --   12/14/21 0940 -- -- -- -- 28 100 % --   12/14/21 0928 (!) 169/93 98.6 °F (37 °C) -- 127 22 100 % --   12/14/21 0913 -- -- -- 131 -- 91 % --       Oxygen Saturation Interpretation: Improved after treatment    ------------------------------------------ PROGRESS NOTES ------------------------------------------      Counseling:  I have spoken with the patient and discussed todays results, in addition to providing specific details for the plan of care and counseling regarding the diagnosis and prognosis. Their questions are answered at this time and they are agreeable with the plan of admission.    --------------------------------- ADDITIONAL PROVIDER NOTES ---------------------------------  Consultations:  Time: 1844. Spoke with Dr. Abigail Jones. Discussed case. They will admit the patient. This patient's ED course included: a personal history and physicial examination, re-evaluation prior to disposition, multiple bedside re-evaluations, IV medications, cardiac monitoring and continuous pulse oximetry    This patient has remained hemodynamically stable during their ED course. Diagnosis:  1. Shortness of breath    2. COPD exacerbation ([address]        Disposition:  Patient's disposition: Admit to telemetry  Patient's condition is stable.              Lachelle Wu MD  Resident  12/15/21 5979

## 2021-12-14 NOTE — ED NOTES
Bed: 23  Expected date: 12/14/21  Expected time:   Means of arrival:   Comments:  Star Vang RN  12/14/21 8925

## 2021-12-14 NOTE — ED NOTES
Department of Emergency Medicine  FIRST PROVIDER TRIAGE NOTE             Independent MLP           12/14/21  9:28 AM EST    Date of Encounter: 12/14/21   MRN: [id number]      HPI: Gianna Farris is a 68 y.o. male who presents to the ED for Shortness of Breath (hx asthma, worsening SOB since yesterday, exp wheezes in triage, denies CP )     Patient is a 77-year-old male that states that yesterday he started to develop worsening breathing. Patient states he does have asthma for the last 5 to 6 years. Patient states he was not doing anything when it started yesterday. Patient states he has no chest pain just trouble taking a deep breath and is wheezing. Patient states he has been using an inhaler without relief. ROS: Negative for abd pain, back pain, fever, cough, vomiting, diarrhea, urinary complaints, rash, headache, dizziness, Suicidal ideation or Homicidal Ideation. PE: Gen Appearance/Constitutional: alert  HEENT: NC/NT. PERRLA,  Airway patent. Neck: supple  CV: tachycardia  Pulm: abnormal breath sounds auscultated, labored breathing tachypneic wheezing and diminished breath sounds noted bilaterally, patient not moving much air     Initial Plan of Care: All treatment areas with department are currently occupied. Plan to order/Initiate the following while awaiting opening in ED: labs, EKG, imaging studies, supplemental oxygen and aerosol treatments.     Initial Plan of Care: Initiate Treatment-Testing, Proceed toTreatment Area When Bed Available for ED Attending/MLP to Continue Care    Electronically signed by Omaira Soler PA-C   DD: 12/14/21         Omaira Soler PA-C  12/14/21 6964

## 2021-12-15 VITALS
WEIGHT: 160.2 LBS | BODY MASS INDEX: 25.09 KG/M2 | HEART RATE: 65 BPM | RESPIRATION RATE: 23 BRPM | OXYGEN SATURATION: 97 % | DIASTOLIC BLOOD PRESSURE: 66 MMHG | TEMPERATURE: 96.3 F | SYSTOLIC BLOOD PRESSURE: 146 MMHG

## 2021-12-15 PROBLEM — R91.1 PULMONARY NODULE: Status: ACTIVE | Noted: 2021-12-15

## 2021-12-15 PROBLEM — J45.901 ASTHMA EXACERBATION: Status: ACTIVE | Noted: 2021-12-15

## 2021-12-15 LAB
ALBUMIN SERPL-MCNC: 4 G/DL (ref 3.5–5.2)
ALP BLD-CCNC: 86 U/L (ref 40–129)
ALT SERPL-CCNC: 13 U/L (ref 0–40)
ANION GAP SERPL CALCULATED.3IONS-SCNC: 11 MMOL/L (ref 7–16)
AST SERPL-CCNC: 18 U/L (ref 0–39)
BASOPHILS ABSOLUTE: 0.02 E9/L (ref 0–0.2)
BASOPHILS RELATIVE PERCENT: 0.3 % (ref 0–2)
BILIRUB SERPL-MCNC: 0.5 MG/DL (ref 0–1.2)
BUN BLDV-MCNC: 25 MG/DL (ref 6–23)
CALCIUM SERPL-MCNC: 10.3 MG/DL (ref 8.6–10.2)
CHLORIDE BLD-SCNC: 106 MMOL/L (ref 98–107)
CO2: 24 MMOL/L (ref 22–29)
CREAT SERPL-MCNC: 0.8 MG/DL (ref 0.7–1.2)
EOSINOPHILS ABSOLUTE: 0.02 E9/L (ref 0.05–0.5)
EOSINOPHILS RELATIVE PERCENT: 0.3 % (ref 0–6)
GFR AFRICAN AMERICAN: >60
GFR NON-AFRICAN AMERICAN: >60 ML/MIN/1.73
GLUCOSE BLD-MCNC: 100 MG/DL (ref 74–99)
HCT VFR BLD CALC: 40.1 % (ref 37–54)
HEMOGLOBIN: 13.6 G/DL (ref 12.5–16.5)
IMMATURE GRANULOCYTES #: 0.02 E9/L
IMMATURE GRANULOCYTES %: 0.3 % (ref 0–5)
LYMPHOCYTES ABSOLUTE: 1.11 E9/L (ref 1.5–4)
LYMPHOCYTES RELATIVE PERCENT: 17.4 % (ref 20–42)
MCH RBC QN AUTO: 32.2 PG (ref 26–35)
MCHC RBC AUTO-ENTMCNC: 33.9 % (ref 32–34.5)
MCV RBC AUTO: 94.8 FL (ref 80–99.9)
MONOCYTES ABSOLUTE: 0.72 E9/L (ref 0.1–0.95)
MONOCYTES RELATIVE PERCENT: 11.3 % (ref 2–12)
NEUTROPHILS ABSOLUTE: 4.49 E9/L (ref 1.8–7.3)
NEUTROPHILS RELATIVE PERCENT: 70.4 % (ref 43–80)
PDW BLD-RTO: 13.5 FL (ref 11.5–15)
PLATELET # BLD: 188 E9/L (ref 130–450)
PMV BLD AUTO: 10 FL (ref 7–12)
POTASSIUM REFLEX MAGNESIUM: 4.1 MMOL/L (ref 3.5–5)
RBC # BLD: 4.23 E12/L (ref 3.8–5.8)
SODIUM BLD-SCNC: 141 MMOL/L (ref 132–146)
TOTAL PROTEIN: 6.5 G/DL (ref 6.4–8.3)
WBC # BLD: 6.4 E9/L (ref 4.5–11.5)

## 2021-12-15 PROCEDURE — 85025 COMPLETE CBC W/AUTO DIFF WBC: CPT

## 2021-12-15 PROCEDURE — 6370000000 HC RX 637 (ALT 250 FOR IP): Performed by: INTERNAL MEDICINE

## 2021-12-15 PROCEDURE — G0378 HOSPITAL OBSERVATION PER HR: HCPCS

## 2021-12-15 PROCEDURE — 6360000002 HC RX W HCPCS: Performed by: INTERNAL MEDICINE

## 2021-12-15 PROCEDURE — 2580000003 HC RX 258: Performed by: INTERNAL MEDICINE

## 2021-12-15 PROCEDURE — 94640 AIRWAY INHALATION TREATMENT: CPT

## 2021-12-15 PROCEDURE — 97161 PT EVAL LOW COMPLEX 20 MIN: CPT

## 2021-12-15 PROCEDURE — 96372 THER/PROPH/DIAG INJ SC/IM: CPT

## 2021-12-15 PROCEDURE — 80053 COMPREHEN METABOLIC PANEL: CPT

## 2021-12-15 RX ORDER — PREDNISONE 20 MG/1
40 TABLET ORAL DAILY
Qty: 10 TABLET | Refills: 0 | Status: SHIPPED | OUTPATIENT
Start: 2021-12-16 | End: 2021-12-21 | Stop reason: ALTCHOICE

## 2021-12-15 RX ORDER — DOXYCYCLINE HYCLATE 100 MG/1
100 CAPSULE ORAL EVERY 12 HOURS SCHEDULED
Qty: 10 CAPSULE | Refills: 0 | Status: SHIPPED | OUTPATIENT
Start: 2021-12-15 | End: 2021-12-20

## 2021-12-15 RX ORDER — ACYCLOVIR 200 MG/1
400 CAPSULE ORAL 2 TIMES DAILY
COMMUNITY

## 2021-12-15 RX ADMIN — ARFORMOTEROL TARTRATE 15 MCG: 15 SOLUTION RESPIRATORY (INHALATION) at 04:30

## 2021-12-15 RX ADMIN — DOXYCYCLINE HYCLATE 100 MG: 100 CAPSULE ORAL at 09:06

## 2021-12-15 RX ADMIN — SODIUM CHLORIDE, PRESERVATIVE FREE 10 ML: 5 INJECTION INTRAVENOUS at 09:08

## 2021-12-15 RX ADMIN — PREDNISONE 40 MG: 20 TABLET ORAL at 09:06

## 2021-12-15 RX ADMIN — ENOXAPARIN SODIUM 40 MG: 100 INJECTION SUBCUTANEOUS at 09:07

## 2021-12-15 RX ADMIN — LAMOTRIGINE 150 MG: 100 TABLET ORAL at 09:06

## 2021-12-15 RX ADMIN — FLUTICASONE PROPIONATE 2 SPRAY: 50 SPRAY, METERED NASAL at 09:10

## 2021-12-15 RX ADMIN — IPRATROPIUM BROMIDE AND ALBUTEROL SULFATE 1 AMPULE: .5; 2.5 SOLUTION RESPIRATORY (INHALATION) at 12:56

## 2021-12-15 RX ADMIN — IPRATROPIUM BROMIDE AND ALBUTEROL SULFATE 1 AMPULE: .5; 2.5 SOLUTION RESPIRATORY (INHALATION) at 08:33

## 2021-12-15 RX ADMIN — BUDESONIDE 500 MCG: 0.5 SUSPENSION RESPIRATORY (INHALATION) at 04:29

## 2021-12-15 RX ADMIN — BUPROPION HYDROCHLORIDE 150 MG: 75 TABLET, FILM COATED ORAL at 09:06

## 2021-12-15 NOTE — PROGRESS NOTES
Physical Therapy  Physical Therapy Initial Assessment     Name: Errol Sanchez  : 1945  MRN: [id number]      Date of Service: 12/15/2021    Evaluating PT:  Ptaricia Fernandez PT, DPT    Room #:  2162/7721-T  Diagnosis:  Shortness of breath [R06.02]  COPD exacerbation (HCC) [J44.1]  PMHx/PSHx:  COPD, anxiety, prostate CA  Procedure/Surgery:  N/A  Equipment Needs:  None anticipated    SUBJECTIVE:    Pt lives with spouse in a 2 story home with 4 steps to enter and 1 handrail. Bed/bath is on 2nd floor. Pt ambulated with no AD PTA. OBJECTIVE:   Initial Evaluation  Date:    AM-PAC 6 Clicks    Was pt agreeable to Eval/treatment? yes   Does pt have pain? No pain   Bed Mobility  Rolling: IND  Supine to sit: IND  Sit to supine: IND  Scooting: IND   Transfers Sit to stand: IND  Stand to sit: IND  Stand pivot: IND   Ambulation    300' with no AD IND   Stair negotiation: ascended and descended  pt declined need to attempt stairs this date     Strength/ROM:   BLE WNL    Balance:   Static Sitting: IND  Dynamic Sitting: IND  Static Standing: IND  Dynamic Standing: IND    Pt is A & O x 3  Sensation:  Pt denies numbness and tingling to extremities  Edema:  unremarkable    Therapeutic Exercises:    Bed mobility: supine<>sit  Transfers: STSx1  Ambulation: 300'x1 with no AD    Patient education  Pt educated on role of PT, importance of functional mobility during hospital stay    Patient response to education:   Pt verbalized understanding Pt demonstrated skill Pt requires further education in this area   yes yes no     ASSESSMENT:    Comments:    Pt supine in bed upon entering, agreeable to participate. Pt's son also present at bedside. Pt instructed to transfer from bed and ambulate in hallway. Pt completing transfer independently, demonstrating normal static standing balance. Pt ambulating well with flexed trunk, no significant deviations this date. Pt reporting no SOB or fatigue with ambulation.  Pt declined need to attempt stair negotiation this date, confident in his ability to manage at home. Pt transferring back to bed after bout. Encouraged to continue ambulating with nursing during hospital stay to reduce weakness associated with prolonged bedrest. Pt's call bell in reach and all needs met prior to exiting. Patient and or family understand(s) diagnosis, prognosis, and plan of care. yes    PHYSICAL THERAPY PLAN OF CARE:    Pt demonstrating functional independence during evaluation. Pt voiced no concerns for mobility or skilled needs upon d/c and agreed to have PT sign off at this time. Referring provider/PT Order:  Yasmeen Rivas MD  Diagnosis:  Shortness of breath [R06.02]  COP[facility] exacerbation ([facility] [J44.1]    Time in  1042  Time out  1152    Total Treatment Time  0 minutes     Evaluation Time includes thorough review of current medical information, gathering information on past medical history/social history and prior level of function, completion of standardized testing/informal observation of tasks, assessment of data and education on plan of care and goals.     CPT codes:  [x] Low Complexity PT evaluation 10845  [] Moderate Complexity PT evaluation 46594  [] High Complexity PT evaluation 51676  [] PT Re-evaluation 89196  [] Gait training 47667 -- minutes  [] Manual therapy [id number] -- minutes  [] Therapeutic activities 17777 -- minutes  [] Therapeutic exercises 93641 -- minutes  [] Neuromuscular reeducation 42691 -- minutes     Nilson Khanna, PT, DPT  [id number]

## 2021-12-15 NOTE — PROGRESS NOTES
Date:12/15/2021  Patient Name: Dilma Hines  MRN: [id number]  : 1945  ROOM #: 5838/8793-V    Occupational Therapy Screen    OT orders received and chart reviewed. OT screen completed as pt politely declines need for skilled therapy. Pt states no concerns for skilled OT services at this time during admission or upon return to home. Pt demonstrated independence in bathroom and room. OT will discontinue orders at this time. Please re-order OT if there is a change in physical status and OT is needed.      Thank you,    Cathy Barth, [address] OTR/L #[id number]

## 2021-12-15 NOTE — DISCHARGE SUMMARY
Pt admitted less than 24 hours    Please see H&P    D/C med list:    Current Discharge Medication List      START taking these medications    Details   predniSONE (DELTASONE) 20 MG tablet Take 2 tablets by mouth daily for 5 days  Qty: 10 tablet, Refills: 0      doxycycline hyclate (VIBRAMYCIN) 100 MG capsule Take 1 capsule by mouth every 12 hours for 5 days  Qty: 10 capsule, Refills: 0      albuterol (PROVENTIL) (5 MG/ML) 0.5% nebulizer solution Take 1 mL by nebulization 4 times daily as needed for Wheezing  Qty: 120 each, Refills: 3         CONTINUE these medications which have NOT CHANGED    Details   acyclovir (ZOVIRAX) 200 MG capsule Take 400 mg by mouth 2 times daily      clonazePAM (KLONOPIN) 0.5 MG tablet TAKE 1 TABLET BY MOUTH THREE TIMES DAILY AS NEEDED FOR ANXIETY  Qty: 270 tablet, Refills: 0    Associated Diagnoses: Depression with anxiety      albuterol sulfate  (90 Base) MCG/ACT inhaler Inhale 2 puffs into the lungs every 6 hours as needed for Wheezing  Qty: 1 Inhaler, Refills: 1      buPROPion (WELLBUTRIN) 75 MG tablet Take 450 mg by mouth daily       lamoTRIgine (LAMICTAL) 150 MG tablet Take 150 mg by mouth daily      fluticasone (FLONASE) 50 MCG/ACT nasal spray 2 sprays by Nasal route daily  Qty: 1 Bottle, Refills: 3    Associated Diagnoses: Nasal congestion      SYMBICORT 160-4.5 MCG/ACT AERO 2 puffs as needed

## 2021-12-15 NOTE — H&P
[address] [facility]  History and Physical      CHIEF COMPLAINT:    Chief Complaint   Patient presents with    Shortness of Breath     hx asthma, worsening SOB since yesterday, exp wheezes in triage, denies CP         Patient of Lia Torrez DO presents with:  Asthma exacerbation    History of Present Illness:   Patient reports that yesterday he developed worsening shortness of breath and wheezing. No chest pain. No fevers or cough. No sick contacts. The severity was severe and not alleviated by home albuterol MDI. No other associated symptoms. He has improved significantly after getting IV steroids and bronchodilators in the emergency department. He now feels pretty much back to normal.  He was noted to have tachycardia on admission as well. He denies palpitations or chest pain. The EKG is a little unclear, there is some irregularity. He denies any prior history of atrial fibrillation. He is currently in normal sinus rhythm and has been in sinus rhythm throughout his time on telemetry. REVIEW OF SYSTEMS:  Pertinent negatives are above in HPI. 10 point ROS otherwise negative.       Past Medical History:   Diagnosis Date    Anxiety     Asthma     Bipolar disorder ([facility]     Depression     Diverticulosis 12/04    [facility] 2020    44 tx radiation         Past Surgical History:   Procedure Laterality Date    CHOLECYSTECTOMY      COLONOSCOPY  12/21/2004    HERNIA REPAIR         Medications Prior to Admission:    Medications Prior to Admission: acyclovir (ZOVIRAX) 200 MG capsule, Take 400 mg by mouth 2 times daily  clonazePAM (KLONOPIN) 0.5 MG tablet, TAKE 1 TABLET BY MOUTH THREE TIMES DAILY AS NEEDED FOR ANXIETY  albuterol sulfate  (90 Base) MCG/ACT inhaler, Inhale 2 puffs into the lungs every 6 hours as needed for Wheezing  buPROPion (WELLBUTRIN) 75 MG tablet, Take 450 mg by mouth daily   lamoTRIgine (LAMICTAL) 150 MG tablet, Take 150 mg by mouth daily  fluticasone (FLONASE) 50 MCG/ACT nasal spray, 2 sprays by Nasal route daily (Patient taking differently: 1 spray by Nasal route nightly 1 spray each nostril)  SYMBICORT 160-4.5 MCG/ACT AERO, 2 puffs as needed     Note that the patient's home medications were reviewed and the above list is accurate to the best of my knowledge at the time of the exam.    Allergies:    Patient has no known allergies. Social History:    reports that he has never smoked. He has never used smokeless tobacco. He reports current alcohol use of about 1.0 standard drink of alcohol per week. He reports that he does not use drugs. Family History:   family history includes Cancer (age of onset: 72) in his mother. PHYSICAL EXAM:    Vitals:  BP (!) 107/52   Pulse 67   Temp 97.6 °F (36.4 °C) (Temporal)   Resp 18   Wt 160 lb 3.2 oz (72.7 kg)   SpO2 91%   BMI 25.09 kg/m²       General appearance: NAD, conversant  Eyes: Sclerae anicteric, PERRLA  HEENT: AT/NC, MMM  Neck: FROM, supple, no thyromegaly  Lymph: No cervical / supraclavicular lymphadenopathy  Lungs: Clear to auscultation though somewhat distant, WOB normal both at rest and after brisk ambulation  CV: RRR, no MRGs, no lower extremity edema  Abdomen: Soft, non-tender; no masses or HSM, +BS  Extremities: FROM without synovitis. No clubbing or cyanosis of the hands. Skin: no rash, induration, lesions, or ulcers  Psych: Calm and cooperative. Normal judgement and insight. Normal mood and affect. Neuro: Alert and interactive, face symmetric, speech fluent. LABS:  All labs reviewed.   Of note:  CBC with Differential:    Lab Results   Component Value Date    WBC 6.4 12/15/2021    RBC 4.23 12/15/2021    HGB 13.6 12/15/2021    HCT 40.1 12/15/2021     12/15/2021    MCV 94.8 12/15/2021    MCH 32.2 12/15/2021    MCHC 33.9 12/15/2021    RDW 13.5 12/15/2021    LYMPHOPCT 17.4 12/15/2021    MONOPCT 11.3 12/15/2021    BASOPCT 0.3 12/15/2021    MONOSABS 0.72 12/15/2021 LYMPHSABS 1.11 12/15/2021    EOSABS 0.02 12/15/2021    BASOSABS 0.02 12/15/2021     CMP:    Lab Results   Component Value Date     12/15/2021    K 4.1 12/15/2021     12/15/2021    CO2 24 12/15/2021    BUN 25 12/15/2021    CREATININE 0.8 12/15/2021    GFRAA >60 12/15/2021    LABGLOM >60 12/15/2021    GLUCOSE 100 12/15/2021    PROT 6.5 12/15/2021    LABALBU 4.0 12/15/2021    CALCIUM 10.3 12/15/2021    BILITOT 0.5 12/15/2021    ALKPHOS 86 12/15/2021    AST 18 12/15/2021    ALT 13 12/15/2021       Imaging:  I've personally reviewed the patient's CTA chest  No evidence of pulmonary embolism or acute pulmonary abnormality. Hyper aerated lungs, suspect COPD. Partially visualized left hydronephrosis and questionable right renal cyst.     EKG:  I've personally reviewed the patient's EKG:  Tachycardia, possibly looks like AF with RBBB    Telemetry:  I've personally reviewed the patient's telemetry:  NSR no arrhythmias     ASSESSMENT/PLAN:  Active Problems:    COPD exacerbation (HCC)  Resolved Problems:    * No resolved hospital problems. *      COPD/asthma exacerbation already improving quite nicely    No hypoxemia. Lungs still distant but may be baseline. WOB totally normal at rest and with brisk ambulation    No signs of PNA    Prednisone, bronchodilators, doxy    Not sure if he had AF. He has no history of this. May have just been tachycardia, I see in his prior EKG in 2014 he has very diminuitive P waves.   Advised discussing with his PCP about possible Zio / Holter    DVT prophylaxis: Enoxaparin  Code status: Full  Requires obs level of care  Trang Strickland MD    9:31 AM  12/15/2021

## 2021-12-15 NOTE — CARE COORDINATION
[facility] [id number] [facility], [address], Neeta Zarate, [phone]): I met with pt who resides with wife , they are both retired. Pt said he is independent and drives. He is a  and uses the [address] for meds, consults and labs. Pt has a pcp in the community , [place]. No dme or hhc pta. Pt has a 2 story home with 2nd floor bed and bath. There is no 1/2 bath on the first floor but there is one in the cellar. Pt has 4 steps to enter thru the side door. Plan is home with no needs anticipated per pt. Sw/gabby to follow for transition of care needs. SALVADOR Espinoza.12/15/2021  Order for nebulizer in chart. Pt has no preference for dme co when offered. I called [facility] but they are out of network. [facility] will not deliver one and they are not close to where pt lives. [facility] has them in stock but doesn't bill the insurance and cost is $45. I faxed the order and face sheet to [facility] and told the pt and son they will have to go to the [place] or [place] location to pick it up. SALVADOR Espinoza  12/15/2021    The Plan for Transition of Care is related to the following treatment goals: dme     The Patient and/or patient representative  was provided with a choice of provider and agrees   with the discharge plan. [x] Yes [] No    Freedom of choice list was provided with basic dialogue that supports the patient's individualized plan of care/goals, treatment preferences and shares the quality data associated with the providers.  [x] Yes [] No]

## 2021-12-16 ENCOUNTER — CARE COORDINATION (OUTPATIENT)
Dept: CASE MANAGEMENT | Age: 76
End: 2021-12-16

## 2021-12-16 NOTE — CARE COORDINATION
Ram[facility] Transitions Initial Follow Up Call    Call within 2 business days of discharge: Yes    Patient: Genaro Aguero Patient : 1945   MRN: <[id number]>  Reason for Admission: 2021 - 12/15/2021 [facility]. Asthma. Discharge Date: 12/15/21 RARS: No data recorded  NR  CT  Last Discharge [facility]       Complaint Diagnosis Description Type Department Provider    21 Shortness of Breath Shortness of breath . .. ED to Hosp-Admission (Discharged) (ADMITTED) [facility] Mallory Mcleod MD; Tabby Astorga. .. Initial CT Outreach attempt leaving [facility]. [facility]  9:15.        Care Transitions 24 Hour Call    Care Transitions Interventions         Follow Up  Future Appointments   Date Time Provider Carlos Cornejo   2022  9:15 AM [facility]

## 2021-12-17 ENCOUNTER — CARE COORDINATION (OUTPATIENT)
Dept: CASE MANAGEMENT | Age: 76
End: 2021-12-17

## 2021-12-17 DIAGNOSIS — J45.901 EXACERBATION OF ASTHMA, UNSPECIFIED ASTHMA SEVERITY, UNSPECIFIED WHETHER PERSISTENT: Primary | ICD-10-CM

## 2021-12-17 PROCEDURE — 1111F DSCHRG MED/CURRENT MED MERGE: CPT | Performed by: FAMILY MEDICINE

## 2021-12-17 NOTE — CARE COORDINATION
Ram[facility] Transitions Initial Follow Up Call    Call within 2 business days of discharge: Yes    Patient: Chelly Medina Patient : 1945   MRN: <[id number]>  Reason for Admission: 2021 - 12/15/2021 [facility]. Asthma. Discharge Date: 12/15/21 RARS: No data recorded  NR  CT  Last Discharge [facility]       Complaint Diagnosis Description Type Department Provider    21 Shortness of Breath Shortness of breath . .. ED to Hosp-Admission (Discharged) (ADMITTED) [facility] Mihaela Martinez MD; Suella Curet. ..              [facility].     PCP 21 11:15     Transitions of Care Initial Call    Was this an external facility discharge? No Discharge Facility:     Challenges to be reviewed by the provider   Additional needs identified to be addressed with provider: No  none             Method of communication with provider : none      Advance Care Planning:   Does patient have an Advance Directive: Spouse/Giulia Ibrahim primary decision maker [phone], Child/Kulwinder Oumar Ibrahim secondary decision maker [phone]. Was this a readmission? No  Patient stated reason for admission: Presented w/ SOB and exp wheezes. Current: Perry reports no exertional SOB, fever, chills, or flu-like symptoms. States he has occasional wheeze and is responsive to nebs. Has/taking his meds as directed. Denies any home or DME needs. PCP appt  11:15. Patients top risk factors for readmission: Asthma    Care Transition Nurse (CTN) contacted the patient by telephone to perform post hospital discharge assessment. Verified name and  with patient as identifiers. Provided introduction to self, and explanation of the CTN role. CTN reviewed discharge instructions, medical action plan and red flags with patient who verbalized understanding. Patient given an opportunity to ask questions and does not have any further questions or concerns at this time.  Were discharge instructions available to patient? Yes. Reviewed appropriate site of care based on symptoms and resources available to patient including: When to call 911. The patient agrees to contact the PCP office for questions related to their healthcare. Medication reconciliation was performed with patient, who verbalizes understanding of administration of home medications. Advised obtaining a 90-day supply of all daily and as-needed medications. CTN provided contact information. No further follow-up call indicated based on severity of symptoms and risk factors. CTN s/o.     Care Transitions 24 Hour Call    Do you have any ongoing symptoms?: Yes  Patient-reported symptoms: Other (Comment: Occasional wheeze responsive to nebs.)  Do you have a copy of your discharge instructions?: Yes  Do you have all of your prescriptions and are they filled?: Yes  Have you scheduled your follow up appointment?: Yes  Were you discharged with any Home Care or Post Acute Services: No  Do you feel like you have everything you need to keep you well at home?: Yes  Care Transitions Interventions         Follow Up  Future Appointments   Date Time Provider Carlos Cornejo   12/21/2021 11:15 AM DO Clifton Noe   2/2/2022  9:15  [address] [place] [address]

## 2021-12-20 LAB
EKG ATRIAL RATE: 54 BPM
EKG Q-T INTERVAL: 320 MS
EKG QRS DURATION: 144 MS
EKG QTC CALCULATION (BAZETT): 448 MS
EKG R AXIS: -52 DEGREES
EKG T AXIS: -57 DEGREES
EKG VENTRICULAR RATE: 118 BPM

## 2021-12-21 ENCOUNTER — OFFICE VISIT (OUTPATIENT)
Dept: PRIMARY CARE CLINIC | Age: 76
End: 2021-12-21
Payer: MEDICARE

## 2021-12-21 VITALS
DIASTOLIC BLOOD PRESSURE: 70 MMHG | SYSTOLIC BLOOD PRESSURE: 120 MMHG | BODY MASS INDEX: 25.69 KG/M2 | WEIGHT: 164 LBS | OXYGEN SATURATION: 98 % | TEMPERATURE: 97.3 F | HEART RATE: 82 BPM

## 2021-12-21 DIAGNOSIS — J45.901 MODERATE ASTHMA WITH EXACERBATION, UNSPECIFIED WHETHER PERSISTENT: Primary | ICD-10-CM

## 2021-12-21 PROCEDURE — 1111F DSCHRG MED/CURRENT MED MERGE: CPT | Performed by: FAMILY MEDICINE

## 2021-12-21 PROCEDURE — 99495 TRANSJ CARE MGMT MOD F2F 14D: CPT | Performed by: FAMILY MEDICINE

## 2021-12-21 RX ORDER — PREDNISONE 20 MG/1
TABLET ORAL
COMMUNITY
Start: 2021-11-24 | End: 2021-12-21 | Stop reason: ALTCHOICE

## 2021-12-21 RX ORDER — AZITHROMYCIN 250 MG/1
TABLET, FILM COATED ORAL
COMMUNITY
Start: 2021-11-24 | End: 2021-12-21 | Stop reason: ALTCHOICE

## 2021-12-21 NOTE — PROGRESS NOTES
Post-Discharge Transitional Care Management Services or Hospital Follow Up      Clifton Ronquillo   YOB: 1945    Date of Office Visit:  12/21/2021  Date of Hospital Admission: 12/14/21  Date of Hospital Discharge: 12/15/21  Risk of hospital readmission (high >=14%. Medium >=10%) :No data recorded    Care management risk score Rising risk (score 2-5) and Complex Care (Scores >=6): 1     Non face to face  following discharge, date last encounter closed (first attempt may have been earlier): 12/17/2021  8:34 AM    Call initiated 2 business days of discharge: Yes    Patient Active Problem List   Diagnosis    Depression with anxiety    Mild intermittent asthma without complication    Prostate cancer ([facility] [address]    Asthma exacerbation       No Known Allergies    Medications listed as ordered at the time of discharge from hospital     Medication List          Accurate as of December 21, 2021 11:50 AM. If you have any questions, ask your nurse or doctor.             CHANGE how you take these medications    fluticasone 50 MCG/ACT nasal spray  Commonly known as: Flonase  2 sprays by Nasal route daily  What changed:   · how much to take  · when to take this  · additional instructions        CONTINUE taking these medications    acyclovir 200 MG capsule  Commonly known as: ZOVIRAX     * albuterol sulfate  (90 Base) MCG/ACT inhaler  Inhale 2 puffs into the lungs every 6 hours as needed for Wheezing     * albuterol (5 MG/ML) 0.5% nebulizer solution  Commonly known as: PROVENTIL  Take 1 mL by nebulization 4 times daily as needed for Wheezing     buPROPion 75 MG tablet  Commonly known as: WELLBUTRIN     clonazePAM 0.5 MG tablet  Commonly known as: KLONOPIN  TAKE 1 TABLET BY MOUTH THREE TIMES DAILY AS NEEDED FOR ANXIETY     lamoTRIgine 150 MG tablet  Commonly known as: LAMICTAL     Symbicort 160-4.5 MCG/ACT Aero  Generic drug: budesonide-formoterol         * This list has 2 medication(s) that are the same as aerosol qid and doing. GI: - N/V  CV: no Cp    A comprehensive review of systems was negative except for what was noted in the HPI. Vitals:    12/21/21 1129   BP: 120/70   Pulse: 82   Temp: 97.3 °F (36.3 °C)   SpO2: 98%   Weight: 164 lb (74.4 kg)     Body mass index is 25.69 kg/m². Wt Readings from Last 3 Encounters:   12/21/21 164 lb (74.4 kg)   12/15/21 160 lb 3.2 oz (72.7 kg)   11/03/21 171 lb 8 oz (77.8 kg)     BP Readings from Last 3 Encounters:   12/21/21 120/70   12/15/21 (!) 146/66   11/03/21 130/80        Physical Exam:  General Appearance: alert and oriented to person, place and time, well-developed and well-nourished, in no acute distress  Skin: warm and dry, no rash or erythema  Eyes: pupils equal, round, and reactive to light, extraocular eye movements intact, conjunctivae normal and conjunctivae normal  Pulmonary/Chest: clear to auscultation bilaterally- no wheezes, rales or rhonchi, normal air movement, no respiratory distress  Cardiovascular: normal rate, normal S1 and S2, no gallops, intact distal pulses and no carotid bruits  Extremities: no cyanosis and no clubbing    Assessment/Plan:  1. Moderate asthma with exacerbation, unspecified whether persistent  - resume Symbicort 2 puffs bid  - use albuterol mdi or aerosol q 4-6 hrs prn.        Medical Decision Making: moderate complexity

## 2022-01-20 ENCOUNTER — HOSPITAL ENCOUNTER (OUTPATIENT)
Dept: ULTRASOUND IMAGING | Age: 77
Discharge: HOME | End: 2022-01-22
Payer: MEDICARE

## 2022-01-20 DIAGNOSIS — N13.30 HYDRONEPHROSIS, UNSPECIFIED HYDRONEPHROSIS TYPE: ICD-10-CM

## 2022-01-20 PROCEDURE — 76770 US EXAM ABDO BACK WALL COMP: CPT

## 2022-02-02 ENCOUNTER — OFFICE VISIT (OUTPATIENT)
Dept: PRIMARY CARE CLINIC | Age: 77
End: 2022-02-02
Payer: MEDICARE

## 2022-02-02 VITALS
HEART RATE: 83 BPM | TEMPERATURE: 97.7 F | SYSTOLIC BLOOD PRESSURE: 128 MMHG | DIASTOLIC BLOOD PRESSURE: 64 MMHG | BODY MASS INDEX: 24.75 KG/M2 | OXYGEN SATURATION: 95 % | WEIGHT: 158 LBS

## 2022-02-02 DIAGNOSIS — J45.901 MODERATE ASTHMA WITH EXACERBATION, UNSPECIFIED WHETHER PERSISTENT: Primary | ICD-10-CM

## 2022-02-02 DIAGNOSIS — F41.8 DEPRESSION WITH ANXIETY: ICD-10-CM

## 2022-02-02 DIAGNOSIS — C61 PROSTATE CANCER (HCC): ICD-10-CM

## 2022-02-02 PROCEDURE — 99214 OFFICE O/P EST MOD 30 MIN: CPT | Performed by: FAMILY MEDICINE

## 2022-02-02 RX ORDER — CLONAZEPAM 0.5 MG/1
TABLET ORAL
Qty: 270 TABLET | Refills: 0 | Status: SHIPPED
Start: 2022-02-02 | End: 2022-05-03 | Stop reason: SDUPTHER

## 2022-02-02 RX ORDER — TIOTROPIUM BROMIDE INHALATION SPRAY 1.56 UG/1
2 SPRAY, METERED RESPIRATORY (INHALATION) DAILY
Qty: 2 EACH | Refills: 0 | COMMUNITY
Start: 2022-02-02 | End: 2022-02-16 | Stop reason: SDUPTHER

## 2022-02-02 RX ORDER — ALBUTEROL SULFATE 90 UG/1
2 AEROSOL, METERED RESPIRATORY (INHALATION) EVERY 6 HOURS PRN
Qty: 1 EACH | Refills: 5 | Status: SHIPPED | OUTPATIENT
Start: 2022-02-02

## 2022-02-02 ASSESSMENT — ENCOUNTER SYMPTOMS
SPUTUM PRODUCTION: 1
SHORTNESS OF BREATH: 1
WHEEZING: 1
COUGH: 1
CHEST TIGHTNESS: 0
DIFFICULTY BREATHING: 1

## 2022-02-02 NOTE — PROGRESS NOTES
Doe Fabian, a male of 68 y.o. came to the office 2/2/2022. Patient Active Problem List   Diagnosis    Depression with anxiety    Mild intermittent asthma without complication    Prostate cancer ([address]    Asthma exacerbation          Asthma  He complains of cough (dry. ), difficulty breathing, shortness of breath, sputum production (some) and wheezing. There is no chest tightness. This is a chronic problem. The current episode started more than 1 year ago. The problem has been gradually worsening. The cough is non-productive. His symptoms are alleviated by steroid inhaler and beta-agonist (needing albuterol nebulizer several times a day. symbicort bid.). He reports moderate improvement on treatment. His past medical history is significant for asthma.    - has appt with pulmonologist Dr Jerilyn Rooney on 2/14. Depression/TIM: not doing well as lately. Ran into house. Neuro: wife noting some memory loss. Prostate cancer: doing well, he's clear due for labs in April. No Known Allergies    Current Outpatient Medications on File Prior to Visit   Medication Sig Dispense Refill    acyclovir (ZOVIRAX) 200 MG capsule Take 400 mg by mouth 2 times daily      albuterol (PROVENTIL) (5 MG/ML) 0.5% nebulizer solution Take 1 mL by nebulization 4 times daily as needed for Wheezing 120 each 3    fluticasone (FLONASE) 50 MCG/ACT nasal spray 2 sprays by Nasal route daily (Patient taking differently: 1 spray by Nasal route nightly 1 spray each nostril) 1 Bottle 3    SYMBICORT 160-4.5 MCG/ACT AERO 2 puffs as needed        No current facility-administered medications on file prior to visit. Review of Systems   Respiratory: Positive for cough (dry. ), sputum production (some), shortness of breath and wheezing. Psychiatric/Behavioral: Positive for decreased concentration. The patient is nervous/anxious.     other review of systems reviewed and are negative    OBJECTIVE:  /64   Pulse 83   Temp 97.7 °F (36.5 °C)   Wt 158 lb (71.7 kg)   SpO2 95%   BMI 24.75 kg/m²      Physical Exam  Constitutional:       General: He is not in acute distress. Eyes:      General: No scleral icterus. Conjunctiva/sclera: Conjunctivae normal.   Neck:      Thyroid: No thyromegaly. Cardiovascular:      Rate and Rhythm: Normal rate and regular rhythm. Heart sounds: No murmur heard. Pulmonary:      Effort: Pulmonary effort is normal.      Breath sounds: Examination of the right-middle field reveals decreased breath sounds. Examination of the left-middle field reveals decreased breath sounds. Examination of the right-lower field reveals decreased breath sounds. Examination of the left-lower field reveals decreased breath sounds. Decreased breath sounds present. Musculoskeletal:      Cervical back: Neck supple. Lymphadenopathy:      Cervical: No cervical adenopathy. Skin:     General: Skin is warm and dry. Neurological:      Mental Status: He is alert and oriented to person, place, and time. 3/3 short term recall    ASSESSMENT AND PLAN:    Tg Garcia was seen today for 3 month follow-up. Diagnoses and all orders for this visit:    Moderate asthma with exacerbation, unspecified whether persistent  -     tiotropium (SPIRIVA RESPIMAT) 1.25 MCG/ACT AERS inhaler; Inhale 2 puffs into the lungs daily  -     albuterol sulfate  (90 Base) MCG/ACT inhaler; Inhale 2 puffs into the lungs every 6 hours as needed for Wheezing or Shortness of Breath    Depression with anxiety  -     clonazePAM (KLONOPIN) 0.5 MG tablet; TAKE 1 TABLET BY MOUTH THREE TIMES DAILY AS NEEDED FOR ANXIETY    Prostate cancer (HCC)    - continue Symbicort bid 2 puffs. - add Spiriva and see pulmonologist as scheduled. - reviewed CT result from Dec.   - continue care for prostate. Return in about 3 months (around 5/2/2022) for or for acute problem.     Dedra Carney,

## 2022-02-16 LAB
BASOPHILS ABSOLUTE: NORMAL
BASOPHILS RELATIVE PERCENT: NORMAL
EOSINOPHILS ABSOLUTE: NORMAL
EOSINOPHILS RELATIVE PERCENT: NORMAL
HCT VFR BLD CALC: NORMAL %
HEMOGLOBIN: NORMAL
LYMPHOCYTES ABSOLUTE: NORMAL
LYMPHOCYTES RELATIVE PERCENT: NORMAL
MCH RBC QN AUTO: NORMAL PG
MCHC RBC AUTO-ENTMCNC: NORMAL G/DL
MCV RBC AUTO: NORMAL FL
MONOCYTES ABSOLUTE: NORMAL
MONOCYTES RELATIVE PERCENT: NORMAL
NEUTROPHILS ABSOLUTE: NORMAL
NEUTROPHILS RELATIVE PERCENT: NORMAL
PDW BLD-RTO: NORMAL %
PLATELET # BLD: NORMAL 10*3/UL
PMV BLD AUTO: NORMAL FL
RBC # BLD: NORMAL 10*6/UL
WBC # BLD: NORMAL 10*3/UL

## 2022-03-21 PROBLEM — J45.909 MODERATE ASTHMA WITHOUT COMPLICATION: Status: ACTIVE | Noted: 2017-04-17

## 2022-04-12 NOTE — PROGRESS NOTES
Perry Wilder  10/14/2020  Wt Readings from Last 3 Encounters:   10/07/20 160 lb 6 oz (72.7 kg)   09/30/20 160 lb 9.6 oz (72.8 kg)   09/23/20 160 lb 14.4 oz (73 kg)     There is no height or weight on file to calculate BMI. Treatment Area:CTV pelvis/pros/prostate/prox SV    Patient was seen today for weekly visit. Comfort Alteration  KPS:80%  Fatigue: Moderate    Nutritional Alteration  Anorexia: No  Nausea: No  Vomiting: No     Elimination Alterations  Constipation: no  Diarrhea:  Yes, occasional  Urinary Frequency/Urgency: Yes  Urinary Retention: No  Dysuria: No  Urinary Incontinence: No  Proctitis: No  Nocturia: Yes #/night: 5     Skin Alteration   Sensation:na    Radiation Dermatitis:  na    Emotional  Coping: effective    Sexuality Alteration  na    Injury, potential bleeding or infection: na    Lab Results   Component Value Date    WBC 7.1 10/21/2014     10/21/2014         There were no vitals taken for this visit. BP within normal range? yes       Assessment/Plan: patient has completed 35/44 fractions, 6300 cGy/7720.     May Juarez [de-identified] : Range of motion today is 0-50°. She does have an intact extensor mechanism being able to achieve full extension.

## 2022-05-03 ENCOUNTER — OFFICE VISIT (OUTPATIENT)
Dept: PRIMARY CARE CLINIC | Age: 77
End: 2022-05-03
Payer: MEDICARE

## 2022-05-03 VITALS
OXYGEN SATURATION: 95 % | SYSTOLIC BLOOD PRESSURE: 124 MMHG | BODY MASS INDEX: 25.53 KG/M2 | TEMPERATURE: 97.7 F | HEART RATE: 90 BPM | DIASTOLIC BLOOD PRESSURE: 62 MMHG | WEIGHT: 163 LBS

## 2022-05-03 DIAGNOSIS — F41.8 DEPRESSION WITH ANXIETY: ICD-10-CM

## 2022-05-03 PROCEDURE — 99213 OFFICE O/P EST LOW 20 MIN: CPT | Performed by: FAMILY MEDICINE

## 2022-05-03 RX ORDER — CLONAZEPAM 0.5 MG/1
TABLET ORAL
Qty: 270 TABLET | Refills: 0 | Status: SHIPPED
Start: 2022-05-03 | End: 2022-08-02 | Stop reason: SDUPTHER

## 2022-05-03 NOTE — PROGRESS NOTES
Janet Larson, a male of 68 y.o. came to the office 5/3/2022. Patient Active Problem List   Diagnosis    Depression with anxiety    Moderate asthma without complication    [facility] [address]    Asthma exacerbation          HPI depression/anxiety: moods doing ok. No issues with Klonopin usually takes bid and occas tid. No Known Allergies    Current Outpatient Medications on File Prior to Visit   Medication Sig Dispense Refill    tiotropium (SPIRIVA RESPIMAT) 1.25 MCG/ACT AERS inhaler Inhale 2 puffs into the lungs daily 2 each 5    albuterol sulfate  (90 Base) MCG/ACT inhaler Inhale 2 puffs into the lungs every 6 hours as needed for Wheezing or Shortness of Breath 1 each 5    acyclovir (ZOVIRAX) 200 MG capsule Take 400 mg by mouth 2 times daily      albuterol (PROVENTIL) (5 MG/ML) 0.5% nebulizer solution Take 1 mL by nebulization 4 times daily as needed for Wheezing 120 each 3    fluticasone (FLONASE) 50 MCG/ACT nasal spray 2 sprays by Nasal route daily (Patient taking differently: 1 spray by Nasal route nightly 1 spray each nostril) 1 Bottle 3    SYMBICORT 160-4.5 MCG/ACT AERO 2 puffs as needed        No current facility-administered medications on file prior to visit. Review of Systems   Constitutional: Negative for activity change (doing well goes walking, meets friends for coffee several times a wk.  with grandkids alot. ), appetite change and fatigue. Psychiatric/Behavioral: Positive for sleep disturbance (on and off). Negative for agitation, decreased concentration and dysphoric mood. The patient is not nervous/anxious. other review of systems reviewed and are negative    OBJECTIVE:  /62   Pulse 90   Temp 97.7 °F (36.5 °C)   Wt 163 lb (73.9 kg)   SpO2 95%   BMI 25.53 kg/m²      Physical Exam  Constitutional:       General: He is not in acute distress. Eyes:      General: No scleral icterus.      Conjunctiva/sclera: Conjunctivae normal.   Neck: Thyroid: No thyromegaly. Cardiovascular:      Rate and Rhythm: Normal rate and regular rhythm. Heart sounds: No murmur heard. Pulmonary:      Effort: Pulmonary effort is normal.      Breath sounds: Normal breath sounds. Musculoskeletal:      Cervical back: Neck supple. Lymphadenopathy:      Cervical: No cervical adenopathy. Skin:     General: Skin is warm and dry. Neurological:      Mental Status: He is alert and oriented to person, place, and time. ASSESSMENT AND PLAN:    Jhoan Bell was seen today for 3 month follow-up. Diagnoses and all orders for this visit:    Depression with anxiety  -     clonazePAM (KLONOPIN) 0.5 MG tablet; TAKE 1 TABLET BY MOUTH THREE TIMES DAILY AS NEEDED FOR ANXIETY    - continue med and exercise. Return in about 3 months (around 8/3/2022).     Gretchen Carney,

## 2022-08-02 DIAGNOSIS — F41.8 DEPRESSION WITH ANXIETY: ICD-10-CM

## 2022-08-02 RX ORDER — CLONAZEPAM 0.5 MG/1
TABLET ORAL
Qty: 270 TABLET | Refills: 0 | Status: SHIPPED
Start: 2022-08-02 | End: 2022-10-26 | Stop reason: SDUPTHER

## 2022-08-04 ENCOUNTER — TELEPHONE (OUTPATIENT)
Dept: PRIMARY CARE CLINIC | Age: 77
End: 2022-08-04

## 2022-08-04 NOTE — TELEPHONE ENCOUNTER
----- Message from Torrie Coker sent at 8/4/2022  1:17 PM EDT -----  Subject: Appointment Request    Reason for Call: Established Patient Appointment needed: Routine Existing   Condition Follow Up    QUESTIONS    Reason for appointment request? Other - patient wants to schedule in late   Oct or Nov     Additional Information for Provider? patient was scheduled to Return in   about 3 months (around 8/3/2022 but wants to schedule way out into late   october.  Is there a reason for him to be seen in August... he seems very   ambivalent about coming in Aug  ---------------------------------------------------------------------------  --------------  [address]  [phone]; OK to leave message on voicemail  ---------------------------------------------------------------------------  --------------  SCRIPT ANSWERS  COVID Screen: Carolina Klein

## 2022-10-25 ENCOUNTER — TELEPHONE (OUTPATIENT)
Dept: PRIMARY CARE CLINIC | Age: 77
End: 2022-10-25

## 2022-10-25 NOTE — TELEPHONE ENCOUNTER
----- Message from Jatinder Witt sent at 10/25/2022 10:36 AM EDT -----  Subject: Appointment Request    Reason for Call: Established Patient Appointment needed: Routine Existing   Condition Follow Up    QUESTIONS    Reason for appointment request? Available appointments did not meet   patient need     Additional Information for Provider? Patient is scheduled to see PCP on   10/26/22. He would like to reschedule due to Asthma is acting up, has been   had to use his nebulizer alot in the last 2 days and don't think he can   make it in. He would prefer VV with PCP Dr. Kranthi Lin.   ---------------------------------------------------------------------------  --------------  Liam MORENO  [phone]; OK to leave message on voicemail  ---------------------------------------------------------------------------  --------------  SCRIPT ANSWERS  COVID Screen: Javier Tobar

## 2022-10-26 ENCOUNTER — TELEMEDICINE (OUTPATIENT)
Dept: PRIMARY CARE CLINIC | Age: 77
End: 2022-10-26
Payer: MEDICARE

## 2022-10-26 DIAGNOSIS — J45.909 MODERATE ASTHMA WITHOUT COMPLICATION, UNSPECIFIED WHETHER PERSISTENT: Primary | ICD-10-CM

## 2022-10-26 DIAGNOSIS — F41.8 DEPRESSION WITH ANXIETY: ICD-10-CM

## 2022-10-26 PROCEDURE — 1123F ACP DISCUSS/DSCN MKR DOCD: CPT | Performed by: FAMILY MEDICINE

## 2022-10-26 PROCEDURE — 99213 OFFICE O/P EST LOW 20 MIN: CPT | Performed by: FAMILY MEDICINE

## 2022-10-26 RX ORDER — CLONAZEPAM 0.5 MG/1
TABLET ORAL
Qty: 270 TABLET | Refills: 0 | Status: SHIPPED | OUTPATIENT
Start: 2022-10-26 | End: 2023-02-17

## 2022-10-26 RX ORDER — OMEPRAZOLE 20 MG/1
20 CAPSULE, DELAYED RELEASE ORAL DAILY
Qty: 30 CAPSULE | Refills: 1 | Status: SHIPPED | OUTPATIENT
Start: 2022-10-26

## 2022-10-26 SDOH — ECONOMIC STABILITY: FOOD INSECURITY: WITHIN THE PAST 12 MONTHS, THE FOOD YOU BOUGHT JUST DIDN'T LAST AND YOU DIDN'T HAVE MONEY TO GET MORE.: NEVER TRUE

## 2022-10-26 SDOH — ECONOMIC STABILITY: FOOD INSECURITY: WITHIN THE PAST 12 MONTHS, YOU WORRIED THAT YOUR FOOD WOULD RUN OUT BEFORE YOU GOT MONEY TO BUY MORE.: NEVER TRUE

## 2022-10-26 ASSESSMENT — ENCOUNTER SYMPTOMS
RHINORRHEA: 0
SHORTNESS OF BREATH: 1
WHEEZING: 1
CHEST TIGHTNESS: 0
ABDOMINAL DISTENTION: 0
ABDOMINAL PAIN: 0
NAUSEA: 0
COUGH: 1
SORE THROAT: 0
DIFFICULTY BREATHING: 1

## 2022-10-26 ASSESSMENT — SOCIAL DETERMINANTS OF HEALTH (SDOH): HOW HARD IS IT FOR YOU TO PAY FOR THE VERY BASICS LIKE FOOD, HOUSING, MEDICAL CARE, AND HEATING?: NOT HARD AT ALL

## 2022-10-26 NOTE — PROGRESS NOTES
Theresa Abreu, a male of 68 y. o.did a virtual visit on 10/26/2022. Patient Active Problem List   Diagnosis    Depression with anxiety    Moderate asthma without complication    Prostate cancer ([facility] [address]    Asthma exacerbation          Asthma  He complains of cough, difficulty breathing, shortness of breath and wheezing. There is no chest tightness. This is a chronic problem. The current episode started more than 1 year ago. The problem has been gradually worsening (over past few months.). The cough is non-productive. Pertinent negatives include no appetite change, fever, nasal congestion, postnasal drip, rhinorrhea or sore throat. His symptoms are aggravated by eating (over eating shruthi with dinner. ). His symptoms are alleviated by beta-agonist, steroid inhaler and leukotriene antagonist. His past medical history is significant for asthma. Cough  Associated symptoms include shortness of breath and wheezing. Pertinent negatives include no fever, nasal congestion, postnasal drip, rhinorrhea or sore throat. His past medical history is significant for asthma. TIM: anxiety doing ok. On Klonopin still.      No Known Allergies    Current Outpatient Medications on File Prior to Visit   Medication Sig Dispense Refill    ibuprofen (ADVIL;MOTRIN) 600 MG tablet Take 1 tablet by mouth every 8 hours as needed for Pain With food 90 tablet 0    tiotropium (SPIRIVA RESPIMAT) 1.25 MCG/ACT AERS inhaler Inhale 2 puffs into the lungs daily 2 each 5    albuterol sulfate  (90 Base) MCG/ACT inhaler Inhale 2 puffs into the lungs every 6 hours as needed for Wheezing or Shortness of Breath 1 each 5    acyclovir (ZOVIRAX) 200 MG capsule Take 400 mg by mouth 2 times daily      albuterol (PROVENTIL) (5 MG/ML) 0.5% nebulizer solution Take 1 mL by nebulization 4 times daily as needed for Wheezing 120 each 3    fluticasone (FLONASE) 50 MCG/ACT nasal spray 2 sprays by Nasal route daily (Patient taking differently: 1 spray by Nasal route nightly 1 spray each nostril) 1 Bottle 3    SYMBICORT 160-4.5 MCG/ACT AERO 2 puffs as needed        No current facility-administered medications on file prior to visit. Review of Systems   Constitutional:  Negative for activity change, appetite change, fatigue and fever. HENT:  Negative for postnasal drip, rhinorrhea and sore throat. Respiratory:  Positive for cough, shortness of breath and wheezing. Gastrointestinal:  Negative for abdominal distention, abdominal pain and nausea. Psychiatric/Behavioral:  Negative for agitation, decreased concentration, dysphoric mood and sleep disturbance. The patient is nervous/anxious. other review of systems reviewed and are negative    OBJECTIVE:  There were no vitals taken for this visit. Physical Exam  Constitutional:       General: He is not in acute distress. Appearance: Normal appearance. He is not ill-appearing, toxic-appearing or diaphoretic. HENT:      Head: Normocephalic. Eyes:      General: No scleral icterus. Pulmonary:      Effort: Pulmonary effort is normal.   Neurological:      Mental Status: He is alert and oriented to person, place, and time. Psychiatric:         Mood and Affect: Mood normal.       ASSESSMENT AND PLAN:    Earnestine Alvarado was seen today for asthma and cough. Diagnoses and all orders for this visit:    Moderate asthma without complication, unspecified whether persistent    Depression with anxiety  -     clonazePAM (KLONOPIN) 0.5 MG tablet; TAKE 1 TABLET BY MOUTH THREE TIMES DAILY AS NEEDED FOR ANXIETY    Other orders  -     omeprazole (PRILOSEC) 20 MG delayed release capsule; Take 1 capsule by mouth daily  -Continue Symbicort and restart Spiriva daily as prescribed. - Use albuterol as needed. - I wonder if his worsening of symptoms with meals is due to possibly a gastrointestinal issue such as reflux or hiatal hernia. We will try omeprazole, a proton pump inhibitor, to see if these help the symptoms.   If no change in 2 to 3 weeks let me know. - continue Klonopin     Return in about 3 months (around 1/26/2023), or if symptoms worsen or fail to improve. Jen Michaud, DO    TeleMedicine Patient Consent    This visit was performed as a virtual video visit using a synchronous, two-way, audio-video telehealth technology platform. Patient identification was verified at the start of the visit, including the patient's telephone number and physical location. I discussed with the patient the nature of our telehealth visits, that:     Due to the nature of an audio- video modality, the only components of a physical exam that could be done are the elements supported by direct observation. I would evaluate the patient and recommend diagnostics and treatments based on my assessment. If it was felt that the patient should be evaluated in clinic or an emergency room setting, then they would be directed there. Our sessions are not being recorded and that personal health information is protected. Our team would provide follow up care in person if/when the patient needs it. Patient does agree to proceed with telemedicine consultation. Patient's location: home address in [facility]. Physician  location [facility]. other people involved in call none. Time spent: Not billed by time    This visit was completed virtually using Doxy. jay Dewitta Martinez, was evaluated through a synchronous (real-time (A/V encounter. The patient (or guardian if applicable (is aware that this is a billable service, which includes applicable co-pays. The virtual visit was conducted with patient's 9 and/or legal guardians (consent. The visit was conducted pursuant to the emergency declaration under the [place] act and the [place], [address] [place] [address] waiver authority and the coronavirus preparedness and response supplemental appropriations act. Patient identification was verified, and a caregiver was present when appropriate.   The patient was located in a state where the provider was licensed to provide care.

## 2022-12-29 ENCOUNTER — HOSPITAL ENCOUNTER (INPATIENT)
Age: 77
LOS: 5 days | Discharge: HOME | DRG: 287 | End: 2023-01-04
Attending: EMERGENCY MEDICINE | Admitting: INTERNAL MEDICINE
Payer: MEDICARE

## 2022-12-29 ENCOUNTER — APPOINTMENT (OUTPATIENT)
Dept: GENERAL RADIOLOGY | Age: 77
DRG: 287 | End: 2022-12-29
Payer: MEDICARE

## 2022-12-29 DIAGNOSIS — I50.9 ACUTE HEART FAILURE, UNSPECIFIED HEART FAILURE TYPE (HCC): Primary | ICD-10-CM

## 2022-12-29 DIAGNOSIS — J45.41 MODERATE PERSISTENT ASTHMA WITH EXACERBATION: ICD-10-CM

## 2022-12-29 LAB
ALBUMIN SERPL-MCNC: 4.2 G/DL (ref 3.5–5.2)
ALP BLD-CCNC: 98 U/L (ref 40–129)
ALT SERPL-CCNC: 41 U/L (ref 0–40)
ANION GAP SERPL CALCULATED.3IONS-SCNC: 11 MMOL/L (ref 7–16)
AST SERPL-CCNC: 26 U/L (ref 0–39)
BASOPHILS ABSOLUTE: 0.04 E9/L (ref 0–0.2)
BASOPHILS RELATIVE PERCENT: 0.9 % (ref 0–2)
BILIRUB SERPL-MCNC: 0.4 MG/DL (ref 0–1.2)
BUN BLDV-MCNC: 18 MG/DL (ref 6–23)
CALCIUM SERPL-MCNC: 10.4 MG/DL (ref 8.6–10.2)
CHLORIDE BLD-SCNC: 109 MMOL/L (ref 98–107)
CO2: 21 MMOL/L (ref 22–29)
CREAT SERPL-MCNC: 0.9 MG/DL (ref 0.7–1.2)
EOSINOPHILS ABSOLUTE: 0.19 E9/L (ref 0.05–0.5)
EOSINOPHILS RELATIVE PERCENT: 4.1 % (ref 0–6)
GFR SERPL CREATININE-BSD FRML MDRD: >60 ML/MIN/1.73
GLUCOSE BLD-MCNC: 143 MG/DL (ref 74–99)
HCT VFR BLD CALC: 38.8 % (ref 37–54)
HEMOGLOBIN: 12.9 G/DL (ref 12.5–16.5)
IMMATURE GRANULOCYTES #: 0.01 E9/L
IMMATURE GRANULOCYTES %: 0.2 % (ref 0–5)
INFLUENZA A: NOT DETECTED
INFLUENZA B: NOT DETECTED
LYMPHOCYTES ABSOLUTE: 0.82 E9/L (ref 1.5–4)
LYMPHOCYTES RELATIVE PERCENT: 17.5 % (ref 20–42)
MAGNESIUM: 2.1 MG/DL (ref 1.6–2.6)
MCH RBC QN AUTO: 31.8 PG (ref 26–35)
MCHC RBC AUTO-ENTMCNC: 33.2 % (ref 32–34.5)
MCV RBC AUTO: 95.6 FL (ref 80–99.9)
MONOCYTES ABSOLUTE: 0.49 E9/L (ref 0.1–0.95)
MONOCYTES RELATIVE PERCENT: 10.4 % (ref 2–12)
NEUTROPHILS ABSOLUTE: 3.14 E9/L (ref 1.8–7.3)
NEUTROPHILS RELATIVE PERCENT: 66.9 % (ref 43–80)
PDW BLD-RTO: 13.7 FL (ref 11.5–15)
PLATELET # BLD: 200 E9/L (ref 130–450)
PMV BLD AUTO: 10 FL (ref 7–12)
POTASSIUM SERPL-SCNC: 4.4 MMOL/L (ref 3.5–5)
PRO-BNP: 6808 PG/ML (ref 0–450)
RBC # BLD: 4.06 E12/L (ref 3.8–5.8)
SARS-COV-2 RNA, RT PCR: NOT DETECTED
SODIUM BLD-SCNC: 141 MMOL/L (ref 132–146)
TOTAL PROTEIN: 6.7 G/DL (ref 6.4–8.3)
TROPONIN, HIGH SENSITIVITY: 25 NG/L (ref 0–11)
WBC # BLD: 4.7 E9/L (ref 4.5–11.5)

## 2022-12-29 PROCEDURE — 6370000000 HC RX 637 (ALT 250 FOR IP): Performed by: NURSE PRACTITIONER

## 2022-12-29 PROCEDURE — 36415 COLL VENOUS BLD VENIPUNCTURE: CPT

## 2022-12-29 PROCEDURE — 83880 ASSAY OF NATRIURETIC PEPTIDE: CPT

## 2022-12-29 PROCEDURE — 99285 EMERGENCY DEPT VISIT HI MDM: CPT

## 2022-12-29 PROCEDURE — 85025 COMPLETE CBC W/AUTO DIFF WBC: CPT

## 2022-12-29 PROCEDURE — 6360000002 HC RX W HCPCS

## 2022-12-29 PROCEDURE — 96375 TX/PRO/DX INJ NEW DRUG ADDON: CPT

## 2022-12-29 PROCEDURE — 96374 THER/PROPH/DIAG INJ IV PUSH: CPT

## 2022-12-29 PROCEDURE — 83735 ASSAY OF MAGNESIUM: CPT

## 2022-12-29 PROCEDURE — 94640 AIRWAY INHALATION TREATMENT: CPT

## 2022-12-29 PROCEDURE — 87636 SARSCOV2 & INF A&B AMP PRB: CPT

## 2022-12-29 PROCEDURE — 93005 ELECTROCARDIOGRAM TRACING: CPT | Performed by: NURSE PRACTITIONER

## 2022-12-29 PROCEDURE — 84484 ASSAY OF TROPONIN QUANT: CPT

## 2022-12-29 PROCEDURE — 71045 X-RAY EXAM CHEST 1 VIEW: CPT

## 2022-12-29 PROCEDURE — 6360000002 HC RX W HCPCS: Performed by: NURSE PRACTITIONER

## 2022-12-29 PROCEDURE — 71045 X-RAY EXAM CHEST 1 VIEW: CPT | Performed by: RADIOLOGY

## 2022-12-29 PROCEDURE — 80053 COMPREHEN METABOLIC PANEL: CPT

## 2022-12-29 RX ORDER — IPRATROPIUM BROMIDE AND ALBUTEROL SULFATE 2.5; .5 MG/3ML; MG/3ML
3 SOLUTION RESPIRATORY (INHALATION) ONCE
Status: COMPLETED | OUTPATIENT
Start: 2022-12-29 | End: 2022-12-29

## 2022-12-29 RX ORDER — FUROSEMIDE 10 MG/ML
20 INJECTION INTRAMUSCULAR; INTRAVENOUS ONCE
Status: COMPLETED | OUTPATIENT
Start: 2022-12-29 | End: 2022-12-29

## 2022-12-29 RX ORDER — METHYLPREDNISOLONE SODIUM SUCCINATE 125 MG/2ML
INJECTION, POWDER, LYOPHILIZED, FOR SOLUTION INTRAMUSCULAR; INTRAVENOUS
Status: COMPLETED
Start: 2022-12-29 | End: 2022-12-29

## 2022-12-29 RX ORDER — METHYLPREDNISOLONE SODIUM SUCCINATE 125 MG/2ML
125 INJECTION, POWDER, LYOPHILIZED, FOR SOLUTION INTRAMUSCULAR; INTRAVENOUS ONCE
Status: COMPLETED | OUTPATIENT
Start: 2022-12-29 | End: 2022-12-29

## 2022-12-29 RX ORDER — CLONAZEPAM 0.5 MG/1
0.5 TABLET ORAL ONCE
Status: COMPLETED | OUTPATIENT
Start: 2022-12-29 | End: 2022-12-29

## 2022-12-29 RX ADMIN — FUROSEMIDE 20 MG: 10 INJECTION, SOLUTION INTRAMUSCULAR; INTRAVENOUS at 23:17

## 2022-12-29 RX ADMIN — IPRATROPIUM BROMIDE AND ALBUTEROL SULFATE 3 AMPULE: .5; 3 SOLUTION RESPIRATORY (INHALATION) at 22:17

## 2022-12-29 RX ADMIN — CLONAZEPAM 0.5 MG: 0.5 TABLET ORAL at 23:46

## 2022-12-29 RX ADMIN — METHYLPREDNISOLONE SODIUM SUCCINATE 125 MG: 125 INJECTION, POWDER, FOR SOLUTION INTRAMUSCULAR; INTRAVENOUS at 22:06

## 2022-12-29 RX ADMIN — METHYLPREDNISOLONE SODIUM SUCCINATE 125 MG: 125 INJECTION, POWDER, LYOPHILIZED, FOR SOLUTION INTRAMUSCULAR; INTRAVENOUS at 22:06

## 2022-12-29 ASSESSMENT — PAIN - FUNCTIONAL ASSESSMENT: PAIN_FUNCTIONAL_ASSESSMENT: NONE - DENIES PAIN

## 2022-12-30 ENCOUNTER — APPOINTMENT (OUTPATIENT)
Dept: NON INVASIVE DIAGNOSTICS | Age: 77
DRG: 287 | End: 2022-12-30

## 2022-12-30 ENCOUNTER — APPOINTMENT (OUTPATIENT)
Dept: NUCLEAR MEDICINE | Age: 77
DRG: 287 | End: 2022-12-30

## 2022-12-30 PROBLEM — I50.9 ACUTE HEART FAILURE, UNSPECIFIED HEART FAILURE TYPE (HCC): Status: ACTIVE | Noted: 2022-12-30

## 2022-12-30 LAB
ANION GAP SERPL CALCULATED.3IONS-SCNC: 13 MMOL/L (ref 7–16)
ANION GAP SERPL CALCULATED.3IONS-SCNC: 14 MMOL/L (ref 7–16)
BACTERIA: ABNORMAL /HPF
BILIRUBIN URINE: NEGATIVE
BLOOD, URINE: ABNORMAL
BUN BLDV-MCNC: 15 MG/DL (ref 6–23)
BUN BLDV-MCNC: 16 MG/DL (ref 6–23)
CALCIUM SERPL-MCNC: 10.7 MG/DL (ref 8.6–10.2)
CALCIUM SERPL-MCNC: 11.1 MG/DL (ref 8.6–10.2)
CHLORIDE BLD-SCNC: 103 MMOL/L (ref 98–107)
CHLORIDE BLD-SCNC: 106 MMOL/L (ref 98–107)
CLARITY: CLEAR
CO2: 21 MMOL/L (ref 22–29)
CO2: 24 MMOL/L (ref 22–29)
COLOR: YELLOW
CREAT SERPL-MCNC: 0.8 MG/DL (ref 0.7–1.2)
CREAT SERPL-MCNC: 0.8 MG/DL (ref 0.7–1.2)
EKG ATRIAL RATE: 79 BPM
EKG P AXIS: 40 DEGREES
EKG P-R INTERVAL: 186 MS
EKG Q-T INTERVAL: 412 MS
EKG QRS DURATION: 146 MS
EKG QTC CALCULATION (BAZETT): 472 MS
EKG R AXIS: -46 DEGREES
EKG T AXIS: 15 DEGREES
EKG VENTRICULAR RATE: 79 BPM
GFR SERPL CREATININE-BSD FRML MDRD: >60 ML/MIN/1.73
GFR SERPL CREATININE-BSD FRML MDRD: >60 ML/MIN/1.73
GLUCOSE BLD-MCNC: 149 MG/DL (ref 74–99)
GLUCOSE BLD-MCNC: 152 MG/DL (ref 74–99)
GLUCOSE URINE: NEGATIVE MG/DL
KETONES, URINE: NEGATIVE MG/DL
LEUKOCYTE ESTERASE, URINE: NEGATIVE
LV EF: 26 %
LVEF MODALITY: NORMAL
NITRITE, URINE: NEGATIVE
PH UA: 6 (ref 5–9)
POTASSIUM REFLEX MAGNESIUM: 4.5 MMOL/L (ref 3.5–5)
POTASSIUM SERPL-SCNC: 4.1 MMOL/L (ref 3.5–5)
PROCALCITONIN: 0.04 NG/ML (ref 0–0.08)
PROTEIN UA: NEGATIVE MG/DL
RBC UA: ABNORMAL /HPF (ref 0–2)
SODIUM BLD-SCNC: 138 MMOL/L (ref 132–146)
SODIUM BLD-SCNC: 143 MMOL/L (ref 132–146)
SPECIFIC GRAVITY UA: 1.01 (ref 1–1.03)
TROPONIN, HIGH SENSITIVITY: 24 NG/L (ref 0–11)
UROBILINOGEN, URINE: 0.2 E.U./DL
WBC UA: ABNORMAL /HPF (ref 0–5)

## 2022-12-30 PROCEDURE — 3430000000 HC RX DIAGNOSTIC RADIOPHARMACEUTICAL: Performed by: RADIOLOGY

## 2022-12-30 PROCEDURE — 99223 1ST HOSP IP/OBS HIGH 75: CPT | Performed by: INTERNAL MEDICINE

## 2022-12-30 PROCEDURE — 78452 HT MUSCLE IMAGE SPECT MULT: CPT

## 2022-12-30 PROCEDURE — 6370000000 HC RX 637 (ALT 250 FOR IP): Performed by: INTERNAL MEDICINE

## 2022-12-30 PROCEDURE — 80048 BASIC METABOLIC PNL TOTAL CA: CPT

## 2022-12-30 PROCEDURE — 84145 PROCALCITONIN (PCT): CPT

## 2022-12-30 PROCEDURE — 6360000002 HC RX W HCPCS: Performed by: INTERNAL MEDICINE

## 2022-12-30 PROCEDURE — 36415 COLL VENOUS BLD VENIPUNCTURE: CPT

## 2022-12-30 PROCEDURE — 84484 ASSAY OF TROPONIN QUANT: CPT

## 2022-12-30 PROCEDURE — 78452 HT MUSCLE IMAGE SPECT MULT: CPT | Performed by: INTERNAL MEDICINE

## 2022-12-30 PROCEDURE — 93017 CV STRESS TEST TRACING ONLY: CPT

## 2022-12-30 PROCEDURE — 94640 AIRWAY INHALATION TREATMENT: CPT

## 2022-12-30 PROCEDURE — 93010 ELECTROCARDIOGRAM REPORT: CPT | Performed by: INTERNAL MEDICINE

## 2022-12-30 PROCEDURE — APPSS60 APP SPLIT SHARED TIME 46-60 MINUTES: Performed by: CLINICAL NURSE SPECIALIST

## 2022-12-30 PROCEDURE — 81001 URINALYSIS AUTO W/SCOPE: CPT

## 2022-12-30 PROCEDURE — 2060000000 HC ICU INTERMEDIATE R&B

## 2022-12-30 PROCEDURE — A9500 TC99M SESTAMIBI: HCPCS | Performed by: RADIOLOGY

## 2022-12-30 PROCEDURE — 93016 CV STRESS TEST SUPVJ ONLY: CPT | Performed by: INTERNAL MEDICINE

## 2022-12-30 PROCEDURE — 93018 CV STRESS TEST I&R ONLY: CPT | Performed by: INTERNAL MEDICINE

## 2022-12-30 RX ORDER — CLONAZEPAM 0.5 MG/1
0.25 TABLET ORAL EVERY 12 HOURS PRN
Status: DISCONTINUED | OUTPATIENT
Start: 2022-12-30 | End: 2023-01-04 | Stop reason: HOSPADM

## 2022-12-30 RX ORDER — ARFORMOTEROL TARTRATE 15 UG/2ML
15 SOLUTION RESPIRATORY (INHALATION) 2 TIMES DAILY
Status: DISCONTINUED | OUTPATIENT
Start: 2022-12-30 | End: 2023-01-04 | Stop reason: HOSPADM

## 2022-12-30 RX ORDER — TECHNETIUM TC-99M SESTAMIBI 1 MG/10ML
10.5 INJECTION INTRAVENOUS
Status: COMPLETED | OUTPATIENT
Start: 2022-12-30 | End: 2022-12-30

## 2022-12-30 RX ORDER — BUPROPION HYDROCHLORIDE 150 MG/1
450 TABLET, EXTENDED RELEASE ORAL DAILY
COMMUNITY

## 2022-12-30 RX ORDER — ALBUTEROL SULFATE 2.5 MG/3ML
5 SOLUTION RESPIRATORY (INHALATION) 4 TIMES DAILY PRN
Status: DISCONTINUED | OUTPATIENT
Start: 2022-12-30 | End: 2023-01-04 | Stop reason: HOSPADM

## 2022-12-30 RX ORDER — BUDESONIDE 0.25 MG/2ML
250 INHALANT ORAL 2 TIMES DAILY
Status: DISCONTINUED | OUTPATIENT
Start: 2022-12-30 | End: 2023-01-04 | Stop reason: HOSPADM

## 2022-12-30 RX ORDER — FUROSEMIDE 10 MG/ML
40 INJECTION INTRAMUSCULAR; INTRAVENOUS 2 TIMES DAILY
Status: DISCONTINUED | OUTPATIENT
Start: 2022-12-30 | End: 2023-01-02

## 2022-12-30 RX ORDER — TECHNETIUM TC-99M SESTAMIBI 1 MG/10ML
30 INJECTION INTRAVENOUS
Status: COMPLETED | OUTPATIENT
Start: 2022-12-30 | End: 2022-12-30

## 2022-12-30 RX ORDER — ENOXAPARIN SODIUM 100 MG/ML
1 INJECTION SUBCUTANEOUS 2 TIMES DAILY
Status: DISCONTINUED | OUTPATIENT
Start: 2022-12-30 | End: 2023-01-03

## 2022-12-30 RX ORDER — PANTOPRAZOLE SODIUM 40 MG/1
40 TABLET, DELAYED RELEASE ORAL
Status: DISCONTINUED | OUTPATIENT
Start: 2022-12-30 | End: 2023-01-04 | Stop reason: HOSPADM

## 2022-12-30 RX ORDER — BENZONATATE 100 MG/1
100 CAPSULE ORAL DAILY
COMMUNITY

## 2022-12-30 RX ORDER — LAMOTRIGINE 200 MG/1
200 TABLET ORAL DAILY
COMMUNITY

## 2022-12-30 RX ADMIN — BUDESONIDE 250 MCG: 0.25 SUSPENSION RESPIRATORY (INHALATION) at 09:48

## 2022-12-30 RX ADMIN — FUROSEMIDE 40 MG: 10 INJECTION, SOLUTION INTRAMUSCULAR; INTRAVENOUS at 16:35

## 2022-12-30 RX ADMIN — ARFORMOTEROL TARTRATE 15 MCG: 15 SOLUTION RESPIRATORY (INHALATION) at 20:22

## 2022-12-30 RX ADMIN — ALBUTEROL SULFATE 5 MG: 2.5 SOLUTION RESPIRATORY (INHALATION) at 14:29

## 2022-12-30 RX ADMIN — PANTOPRAZOLE SODIUM 40 MG: 40 TABLET, DELAYED RELEASE ORAL at 08:36

## 2022-12-30 RX ADMIN — BUDESONIDE 250 MCG: 0.25 SUSPENSION RESPIRATORY (INHALATION) at 20:22

## 2022-12-30 RX ADMIN — IPRATROPIUM BROMIDE 0.5 MG: 0.5 SOLUTION RESPIRATORY (INHALATION) at 14:30

## 2022-12-30 RX ADMIN — FUROSEMIDE 40 MG: 10 INJECTION, SOLUTION INTRAMUSCULAR; INTRAVENOUS at 18:46

## 2022-12-30 RX ADMIN — Medication 10.5 MILLICURIE: at 10:16

## 2022-12-30 RX ADMIN — IPRATROPIUM BROMIDE 0.5 MG: 0.5 SOLUTION RESPIRATORY (INHALATION) at 17:07

## 2022-12-30 RX ADMIN — Medication 31 MILLICURIE: at 11:35

## 2022-12-30 RX ADMIN — ARFORMOTEROL TARTRATE 15 MCG: 15 SOLUTION RESPIRATORY (INHALATION) at 09:51

## 2022-12-30 RX ADMIN — REGADENOSON 0.4 MG: 0.08 INJECTION, SOLUTION INTRAVENOUS at 11:34

## 2022-12-30 RX ADMIN — IPRATROPIUM BROMIDE 0.5 MG: 0.5 SOLUTION RESPIRATORY (INHALATION) at 20:22

## 2022-12-30 RX ADMIN — IPRATROPIUM BROMIDE 0.5 MG: 0.5 SOLUTION RESPIRATORY (INHALATION) at 09:48

## 2022-12-30 ASSESSMENT — PAIN - FUNCTIONAL ASSESSMENT: PAIN_FUNCTIONAL_ASSESSMENT: NONE - DENIES PAIN

## 2022-12-30 ASSESSMENT — PAIN SCALES - GENERAL: PAINLEVEL_OUTOF10: 0

## 2022-12-30 NOTE — H&P
[facility] Internal Medicine  History and Physical      CHIEF COMPLAINT: Shortness of breath    Reason for Admission: Shortness of breath    History Obtained From: Patient    PCP :  Rachel Angel DO    [address] / [place] [address]      HISTORY OF PRESENT ILLNESS:      The patient is a 68 y.o. male presents to the emergency with shortness of breath. He has had no previous cardiac history. Denies any fevers or chills. Patient reports shortness of breath has been longstanding. Does have a prior history of asthma. In the emergency room patient was noted to be hypoxic with elevated BNP. Chest x-ray was consistent with heart failure. He also was needing oxygen. He was then admitted for further evaluation treatment. Patient reports that oxygen has helped him with his breathing. Past Medical History:        Diagnosis Date    Anxiety     Asthma     Bipolar disorder ([facility] [address]     Depression     Diverticulosis 12/04    Prostate cancer [facility]) 2020    44 tx radiation     Past Surgical History:        Procedure Laterality Date    CHOLECYSTECTOMY      COLONOSCOPY  12/21/2004    HERNIA REPAIR           Medications Prior to Admission:    Not in a hospital admission. Allergies:  Patient has no known allergies. Social History:   Social History     Socioeconomic History    Marital status:      Spouse name: Not on file    Number of children: Not on file    Years of education: Not on file    Highest education level: Not on file   Occupational History    Not on file   Tobacco Use    Smoking status: Never    Smokeless tobacco: Never   Vaping Use    Vaping Use: Never used   Substance and Sexual Activity    Alcohol use:  Yes     Alcohol/week: 1.0 standard drink     Types: 1 Glasses of wine per week     Comment: 1 glass every evening since diagnosis    Drug use: No    Sexual activity: Not on file   Other Topics Concern    Not on file   Social History Narrative    Not on file     Social Determinants of Health     Financial Resource Strain: Low Risk     Difficulty of Paying Living Expenses: Not hard at all   Food Insecurity: No Food Insecurity    Worried About Running Out of Food in the Last Year: Never true    Ran Out of Food in the Last Year: Never true   Transportation Needs: Not on file   Physical Activity: Not on file   Stress: Not on file   Social Connections: Not on file   Intimate Partner Violence: Not on file   Housing Stability: Not on file         Family History:       Problem Relation Age of Onset    Cancer Mother 72        bone       REVIEW OF SYSTEMS:    General ROS: negative  Hematological and Lymphatic ROS: negative  Endocrine ROS: negative  Respiratory ROS: positive for shortness of breath  Cardiovascular ROS: Positive for chest pain and dyspnea on exertion  Gastrointestinal ROS: no abdominal pain, change in bowel habits, or black or bloody stools  Genito-Urinary ROS: no dysuria, trouble voiding, or hematuria  Neurological ROS: no TIA or stroke symptoms  negative    Vitals:  /78   Pulse (!) 106   Temp 97.5 °F (36.4 °C)   Resp 24   Ht 5' 7\" (1.702 m)   Wt 165 lb (74.8 kg)   SpO2 96%   BMI 25.84 kg/m²     PHYSICAL EXAM:  General:  Awake, alert, oriented X 3. Well developed, well nourished, well groomed. No apparent distress. HEENT:  Normocephalic, atraumatic. Pupils equal, round, reactive to light. No scleral icterus. No conjunctival injection. Neck:  Supple, no carotid bruits  Heart:  RRR,   Lungs:  CTA bilaterally, bilat symmetrical expansion, no wheeze, rales, or rhonchi  Abdomen:   Bowel sounds present, soft, nontender, no masses, no organomegaly, no peritoneal signs  Extremities:  No clubbing, cyanosis, or edema  Skin:  Warm and dry, no open lesions or rash  Neuro:  Cranial nerves 2-12 intact, no focal deficits      DATA:     Recent Results (from the past 24 hour(s))   EKG 12 Lead    Collection Time: 12/29/22 10:04 PM   Result Value Ref Range    Ventricular Rate 79 BPM Atrial Rate 79 BPM    P-R Interval 186 ms    QRS Duration 146 ms    Q-T Interval 412 ms    QTc Calculation (Bazett) 472 ms    P Axis 40 degrees    R Axis -46 degrees    T Axis 15 degrees   Troponin    Collection Time: 12/29/22 10:05 PM   Result Value Ref Range    Troponin, High Sensitivity 25 (H) 0 - 11 ng/L   CBC with Auto Differential    Collection Time: 12/29/22 10:05 PM   Result Value Ref Range    WBC 4.7 4.5 - 11.5 E9/L    RBC 4.06 3.80 - 5.80 E12/L    Hemoglobin 12.9 12.5 - 16.5 g/dL    Hematocrit 38.8 37.0 - 54.0 %    MCV 95.6 80.0 - 99.9 fL    MCH 31.8 26.0 - 35.0 pg    MCHC 33.2 32.0 - 34.5 %    RDW 13.7 11.5 - 15.0 fL    Platelets 043 637 - 536 E9/L    MPV 10.0 7.0 - 12.0 fL    Neutrophils % 66.9 43.0 - 80.0 %    Immature Granulocytes % 0.2 0.0 - 5.0 %    Lymphocytes % 17.5 (L) 20.0 - 42.0 %    Monocytes % 10.4 2.0 - 12.0 %    Eosinophils % 4.1 0.0 - 6.0 %    Basophils % 0.9 0.0 - 2.0 %    Neutrophils Absolute 3.14 1.80 - 7.30 E9/L    Immature Granulocytes # 0.01 E9/L    Lymphocytes Absolute 0.82 (L) 1.50 - 4.00 E9/L    Monocytes Absolute 0.49 0.10 - 0.95 E9/L    Eosinophils Absolute 0.19 0.05 - 0.50 E9/L    Basophils Absolute 0.04 0.00 - 0.20 E9/L   Comprehensive Metabolic Panel    Collection Time: 12/29/22 10:05 PM   Result Value Ref Range    Sodium 141 132 - 146 mmol/L    Potassium 4.4 3.5 - 5.0 mmol/L    Chloride 109 (H) 98 - 107 mmol/L    CO2 21 (L) 22 - 29 mmol/L    Anion Gap 11 7 - 16 mmol/L    Glucose 143 (H) 74 - 99 mg/dL    BUN 18 6 - 23 mg/dL    Creatinine 0.9 0.7 - 1.2 mg/dL    Est, Glom Filt Rate >60 >=60 mL/min/1.73    Calcium 10.4 (H) 8.6 - 10.2 mg/dL    Total Protein 6.7 6.4 - 8.3 g/dL    Albumin 4.2 3.5 - 5.2 g/dL    Total Bilirubin 0.4 0.0 - 1.2 mg/dL    Alkaline Phosphatase 98 40 - 129 U/L    ALT 41 (H) 0 - 40 U/L    AST 26 0 - 39 U/L   Magnesium    Collection Time: 12/29/22 10:05 PM   Result Value Ref Range    Magnesium 2.1 1.6 - 2.6 mg/dL   Brain Natriuretic Peptide    Collection Time: 12/29/22 10:05 PM   Result Value Ref Range    Pro-BNP 6,808 (H) 0 - 450 pg/mL   COVID-19 & Influenza Combo    Collection Time: 12/29/22 10:05 PM    Specimen: Nasopharyngeal Swab   Result Value Ref Range    SARS-CoV-2 RNA, RT PCR NOT DETECTED NOT DETECTED    INFLUENZA A NOT DETECTED NOT DETECTED    INFLUENZA B NOT DETECTED NOT DETECTED   Troponin    Collection Time: 12/30/22 12:33 AM   Result Value Ref Range    Troponin, High Sensitivity 24 (H) 0 - 11 ng/L   Urinalysis    Collection Time: 12/30/22  3:58 AM   Result Value Ref Range    Color, UA Yellow Straw/Yellow    Clarity, UA Clear Clear    Glucose, Ur Negative Negative mg/dL    Bilirubin Urine Negative Negative    Ketones, Urine Negative Negative mg/dL    Specific Gravity, UA 1.010 1.005 - 1.030    Blood, Urine MODERATE (A) Negative    pH, UA 6.0 5.0 - 9.0    Protein, UA Negative Negative mg/dL    Urobilinogen, Urine 0.2 <2.0 E.U./dL    Nitrite, Urine Negative Negative    Leukocyte Esterase, Urine Negative Negative   Microscopic Urinalysis    Collection Time: 12/30/22  3:58 AM   Result Value Ref Range    WBC, UA NONE 0 - 5 /HPF    RBC, UA 5-10 (A) 0 - 2 /HPF    Bacteria, UA NONE SEEN None Seen /HPF   Basic Metabolic Panel    Collection Time: 12/30/22  4:41 AM   Result Value Ref Range    Sodium 143 132 - 146 mmol/L    Potassium 4.1 3.5 - 5.0 mmol/L    Chloride 106 98 - 107 mmol/L    CO2 24 22 - 29 mmol/L    Anion Gap 13 7 - 16 mmol/L    Glucose 152 (H) 74 - 99 mg/dL    BUN 15 6 - 23 mg/dL    Creatinine 0.8 0.7 - 1.2 mg/dL    Est, Glom Filt Rate >60 >=60 mL/min/1.73    Calcium 10.7 (H) 8.6 - 10.2 mg/dL   Procalcitonin    Collection Time: 12/30/22  9:49 AM   Result Value Ref Range    Procalcitonin 0.04 0.00 - 0.08 ng/mL   Basic Metabolic Panel w/ Reflex to MG    Collection Time: 12/30/22  9:49 AM   Result Value Ref Range    Sodium 138 132 - 146 mmol/L    Potassium reflex Magnesium 4.5 3.5 - 5.0 mmol/L    Chloride 103 98 - 107 mmol/L    CO2 21 (L) 22 - 29 mmol/L Anion Gap 14 7 - 16 mmol/L    Glucose 149 (H) 74 - 99 mg/dL    BUN 16 6 - 23 mg/dL    Creatinine 0.8 0.7 - 1.2 mg/dL    Est, Glom Filt Rate >60 >=60 mL/min/1.73    Calcium 11.1 (H) 8.6 - 10.2 mg/dL       NM MYOCARDIAL SPECT REST EXERCISE OR RX   Final Result      The myocardial perfusion imaging was abnormal with a small sized   moderate intensity fixed perfusion defect of the apex. Minimal   inducible ischemia noted. Overall left ventricular systolic function was abnormal without   regional wall motion abnormalities. Intermediate risk stress test given severe LV dysfunction. XR CHEST PORTABLE   Final Result   Mild congestive heart failure. ASSESSMENT :      Principal Problem:    Acute heart failure, unspecified heart failure type ([address]  Resolved Problems:    * No resolved hospital problems. *    history of asthma  Anxiety disorder  Prior history of a flutter  Depression  Underlying history of bipolar disorder  Prior history of prostate cancer for which he had radiation therapy      Plan :    IV diuresis  Cardiology following  Stress test pending  Echocardiogram pending  Supplemental oxygen  Wean oxygen as able      Electronically signed by Ted Huddleston MD on 12/30/2022 at 4:06 PM    NOTE: This report was transcribed using voice recognition software.  Every effort was made to ensure accuracy; however, inadvertent transcription errors may be present

## 2022-12-30 NOTE — ED NOTES
Patient ambulated to restroom without O2. He was winded when he got back to bed. SPO2 was 92%.  Placed back on 2L NC and increased to 98%     Luis Cruz RN  12/29/22 2319

## 2022-12-30 NOTE — ED PROVIDER NOTES
ED Physician   HPI:  12/29/22, Time: 10:03 PM LELA Sanchez is a 68 y.o. male presenting to the ED for increasing shortness of breath in the setting of asthma. Patient presents to the emergency department states that he has been feeling increasingly short of breath over the last 1 week, states that symptoms worsened today. He reports utilizing his albuterol nebulizer at home without any effect. Patient denies any specific chest pain denies any illness that he is aware of as well as no noted abdominal pain, he does report having a cough bringing up clear-colored sputum. Patient denies any fevers as well as no noted back or flank pain and denies any unusual lower extremity swelling. Patient is a non-smoker. States that he used his albuterol nebulizer multiple times today without any relief. Symptoms moderate in severity and persistent. Review of Systems:   A complete review of systems was performed and pertinent positives and negatives are stated within HPI, all other systems reviewed and are negative.          --------------------------------------------- PAST HISTORY ---------------------------------------------  Past Medical History:  has a past medical history of Anxiety, Asthma, [facility] ([facility] Depression, Diverticulosis, and Prostate cancer ([facility] Past Surgical History:  has a past surgical history that includes Cholecystectomy; hernia repair; and Colonoscopy (12/21/2004). Social History:  reports that he has never smoked. He has never used smokeless tobacco. He reports current alcohol use of about 1.0 standard drink per week. He reports that he does not use drugs. Family History: family history includes Cancer (age of onset: 72) in his mother. The patients home medications have been reviewed. Allergies: Patient has no known allergies.     -------------------------------------------------- RESULTS -------------------------------------------------  All laboratory and radiology results have been personally reviewed by myself   LABS:  Results for orders placed or performed during the hospital encounter of 12/29/22   COVID-19 & Influenza Combo    Specimen: Nasopharyngeal Swab   Result Value Ref Range    SARS-CoV-2 RNA, RT PCR NOT DETECTED NOT DETECTED    INFLUENZA A NOT DETECTED NOT DETECTED    INFLUENZA B NOT DETECTED NOT DETECTED   Troponin   Result Value Ref Range    Troponin, High Sensitivity 25 (H) 0 - 11 ng/L   CBC with Auto Differential   Result Value Ref Range    WBC 4.7 4.5 - 11.5 E9/L    RBC 4.06 3.80 - 5.80 E12/L    Hemoglobin 12.9 12.5 - 16.5 g/dL    Hematocrit 38.8 37.0 - 54.0 %    MCV 95.6 80.0 - 99.9 fL    MCH 31.8 26.0 - 35.0 pg    MCHC 33.2 32.0 - 34.5 %    RDW 13.7 11.5 - 15.0 fL    Platelets 659 277 - 220 E9/L    MPV 10.0 7.0 - 12.0 fL    Neutrophils % 66.9 43.0 - 80.0 %    Immature Granulocytes % 0.2 0.0 - 5.0 %    Lymphocytes % 17.5 (L) 20.0 - 42.0 %    Monocytes % 10.4 2.0 - 12.0 %    Eosinophils % 4.1 0.0 - 6.0 %    Basophils % 0.9 0.0 - 2.0 %    Neutrophils Absolute 3.14 1.80 - 7.30 E9/L    Immature Granulocytes # 0.01 E9/L    Lymphocytes Absolute 0.82 (L) 1.50 - 4.00 E9/L    Monocytes Absolute 0.49 0.10 - 0.95 E9/L    Eosinophils Absolute 0.19 0.05 - 0.50 E9/L    Basophils Absolute 0.04 0.00 - 0.20 E9/L   Comprehensive Metabolic Panel   Result Value Ref Range    Sodium 141 132 - 146 mmol/L    Potassium 4.4 3.5 - 5.0 mmol/L    Chloride 109 (H) 98 - 107 mmol/L    CO2 21 (L) 22 - 29 mmol/L    Anion Gap 11 7 - 16 mmol/L    Glucose 143 (H) 74 - 99 mg/dL    BUN 18 6 - 23 mg/dL    Creatinine 0.9 0.7 - 1.2 mg/dL    Est, Glom Filt Rate >60 >=60 mL/min/1.73    Calcium 10.4 (H) 8.6 - 10.2 mg/dL    Total Protein 6.7 6.4 - 8.3 g/dL    Albumin 4.2 3.5 - 5.2 g/dL    Total Bilirubin 0.4 0.0 - 1.2 mg/dL    Alkaline Phosphatase 98 40 - 129 U/L    ALT 41 (H) 0 - 40 U/L    AST 26 0 - 39 U/L   Magnesium   Result Value Ref Range    Magnesium 2.1 1.6 - 2.6 mg/dL Brain Natriuretic Peptide   Result Value Ref Range    Pro-BNP 6,808 (H) 0 - 450 pg/mL       RADIOLOGY:  Interpreted by Radiologist.  XR CHEST PORTABLE   Final Result   Mild congestive heart failure.             ------------------------- NURSING NOTES AND VITALS REVIEWED ---------------------------   The nursing notes within the ED encounter and vital signs as below have been reviewed. /82   Pulse 85   Temp 97.5 °F (36.4 °C)   Resp 18   Ht 5' 7\" (1.702 m)   Wt 165 lb (74.8 kg)   SpO2 96%   BMI 25.84 kg/m²   Oxygen Saturation Interpretation: Normal      ---------------------------------------------------PHYSICAL EXAM--------------------------------------      Constitutional/General: Alert and oriented x3, moderately uncomfortable head: Normocephalic and atraumatic  Eyes: PERRL, EOMI  Mouth: Oropharynx clear, handling secretions, no trismus  Neck: Supple, full ROM,   Pulmonary: Lungs with expiratory wheezing noted in posterior lung fields and very diminished to bases no tripoding position noted. . Not in respiratory distress  Cardiovascular:  Regular rate and rhythm, no murmurs, gallops, or rubs. 2+ distal pulses  Abdomen: Soft, non tender, non distended,   Extremities: Moves all extremities x 4. Warm and well perfused, no lower extremity swelling noted.   Skin: warm and dry without rash  Neurologic: GCS 15,  Psych: Normal Affect      ------------------------------ ED COURSE/MEDICAL DECISION MAKING----------------------  Medications   ipratropium-albuterol (DUONEB) nebulizer solution 3 ampule (3 ampules Inhalation Given 12/29/22 2217)   methylPREDNISolone sodium (SOLU-MEDROL) injection 125 mg (125 mg IntraVENous Given 12/29/22 2206)   furosemide (LASIX) injection 20 mg (20 mg IntraVENous Given 12/29/22 2317)   clonazePAM (KLONOPIN) tablet 0.5 mg (0.5 mg Oral Given 12/29/22 2346)         ED COURSE: Twelve-lead EKG as interpreted and reviewed by the emergency room attending showing a heart rate of 79, normal sinus rhythm with right bundle branch block and left anterior fascicular block. No acute ST elevation or depression noted. Left axis deviation. Medical Decision Making:    Plan will be for labs will also obtain imaging will provide patient with 3 DuoNeb treatments as well as IV Solu-Medrol 125 mg. Labs resulted initial troponin 25 will obtain repeat troponin. CBC negative, chemistry panel all within normal limits, magnesium level normal, proBNP markedly elevated at 6808 upon review of patient's medications as well as medical history no history of congestive heart failure as well as no noted diuretics. COVID-19 test negative, influenza negative, chest x-ray is showing a mild congestive heart failure plan will be for IV Lasix 20 mg x 1 dose plan will be for inpatient intermediate telemetry admission. Call out to covering provider. Patient was updated on labs as well as diagnosis and medications to be given as well as plan for mission patient is agreeable. Patient with blood pressure 124/77, heart rate 80, respiratory rate 20 pulse ox 99% now on 2 L nasal cannula. Nursing staff did attempt to ambulate patient on room air and they were unsuccessful, patient notably dyspneic 2 L nasal cannula replaced back on patient. Did speak with covering provider, patient will be admitted to inpatient telemetry with diagnosis of acute heart failure unspecified. Patient expressed understanding. Patient admitted to telemetry    Counseling: The emergency provider has spoken with the patient and discussed todays results, in addition to providing specific details for the plan of care and counseling regarding the diagnosis and prognosis. Questions are answered at this time and they are agreeable with the plan.      --------------------------------- IMPRESSION AND DISPOSITION ---------------------------------    IMPRESSION  1. Acute heart failure, unspecified heart failure type ([address]    2.  Moderate persistent asthma with exacerbation        DISPOSITION  Disposition: Admit to telemetry  Patient condition is good      NOTE: This report was transcribed using voice recognition software.  Every effort was made to ensure accuracy; however, inadvertent computerized transcription errors may be present      EMANUEL Cobb - CNP  12/30/22 [address], [place]  12/30/22 0150

## 2022-12-30 NOTE — PROCEDURES
Lexiscan Stress EKG Report:    Dx CP    Baseline EKG: normal sinus rhythm, nonspecific ST and T waves changes, RBBB. Stress EKG: No ST-T changes. Arrhythmias: None. Symptoms: None. Summary:  Unremarkable lexiscan stress EKG. See separate report for stress perfusion results. Scar Arnold D.O.   Cardiologist  Cardiology, [id number] [facility]

## 2022-12-30 NOTE — CONSULTS
Inpatient Cardiology Consultation      Reason for Consult:  CHF    Consulting Physician: Dr. Abe Raymond    Requesting Physician:  Dr. Talat Morton     Date of Consultation: 12/30/2022    History of Present Illness:   Mr. Lane Contreras is a 68year old gentleman who is previously unknown to our practice; he has no known structural heart disease. He was diagnosed with asthma \"a few years ago\" and until recently only became short of breath when taking his trash out in cold weather or walking far distances. He used to walk six miles three to four times daily and frequently meet his friends for coffee. Over the past month, he has had significant increase in his dyspnea, to the point that he now rarely leaves his home. He is no longer able to take out his trash and often becomes short of breath even at rest. His inhalers were recently adjusted but he's had no improvement. He denies orthopnea but over the past few weeks has developed PND and frequent wheezing. He's had no recent weight changes, but descries early satiety and now eats five to six small meals daily as he becomes short of breath when eating. He's had no edema or claudication pain, but has had random, episodic pain to the left of his sternum that typically lasts fifteen to twenty seconds before resolving. He does not recall having illness or other precipitating event prior to this recent functional decline. Last night, he became so short of breath he thought he \"would die\", and called 911. He had some improvement with use of supplemental oxygen en route to the ED, and was treated with SoluMedrol, Duoneb, and 20 mg of IV Lasix. Rapid flu and COVID testing were negative and BNP was 6808 with CXR showing mild CHF. He became hypoxic with ambulation and is now on 2 liters of oxygen via nasal cannula. Documented urinary output thus far is 1500 mls. He is currently sitting up and in no apparent acute distress.  He reports some improvement in his breathing but feels it is not back to how it was one or two weeks ago. Past Medical History:    Atrial flutter documented on EKG 12/14/2021  Asthma   Generalized anxiety disorder  Depression   Bipolar disorder  Prostate cancer s/p radiation therapy   Cholecystectomy     Medications Prior to Admit:  Prior to Admission medications    Medication Sig Start Date End Date Taking?  Authorizing Provider   clonazePAM (KLONOPIN) 0.5 MG tablet TAKE 1 TABLET BY MOUTH THREE TIMES DAILY AS NEEDED FOR ANXIETY 10/26/22 2/17/23  Dallas Carney DO   omeprazole (PRILOSEC) 20 MG delayed release capsule Take 1 capsule by mouth daily 10/26/22   Dallas Carney DO   ibuprofen (ADVIL;MOTRIN) 600 MG tablet Take 1 tablet by mouth every 8 hours as needed for Pain With food 10/4/22   Dallas Carney DO   tiotropium (SPIRIVA RESPIMAT) 1.25 MCG/ACT AERS inhaler Inhale 2 puffs into the lungs daily 2/16/22   Brittnee Mccall MD   albuterol sulfate  (90 Base) MCG/ACT inhaler Inhale 2 puffs into the lungs every 6 hours as needed for Wheezing or Shortness of Breath 2/2/22   Dallas Carney DO   acyclovir (ZOVIRAX) 200 MG capsule Take 400 mg by mouth 2 times daily    Historical Provider, MD   albuterol (PROVENTIL) (5 MG/ML) 0.5% nebulizer solution Take 1 mL by nebulization 4 times daily as needed for Wheezing 12/15/21   Doreen Wang MD   fluticasone (FLONASE) 50 MCG/ACT nasal spray 2 sprays by Nasal route daily  Patient taking differently: 1 spray by Nasal route nightly 1 spray each nostril 1/16/17   Dallas Carney DO   SYMBICORT 160-4.5 MCG/ACT AERO 2 puffs as needed  3/14/16   Historical Provider, MD       Current Medications:    Current Facility-Administered Medications: albuterol (PROVENTIL) nebulizer solution 5 mg, 5 mg, Nebulization, 4x Daily PRN  clonazePAM (KLONOPIN) tablet 0.25 mg, 0.25 mg, Oral, Q12H PRN  pantoprazole (PROTONIX) tablet 40 mg, 40 mg, Oral, QAM AC  ipratropium (ATROVENT) 0.02 % nebulizer solution 0.5 mg, 0.5 mg, Nebulization, 4x daily  budesonide (PULMICORT) nebulizer suspension 250 mcg, 250 mcg, Nebulization, BID  Arformoterol Tartrate (BROVANA) nebulizer solution 15 mcg, 15 mcg, Nebulization, BID    Allergies:  Patient has no known allergies. Social History: There is no history of alcohol, tobacco, or illicit drug use. Family History:   Noncontributory due to his age     Review of Systems:   Constitutional: Denies fatigue, fevers, chills or night sweats  ENT: Denies headaches, bleeding gums, or epistaxis   Cardiovascular: Denies chest pain, palpitations or lower extremity swelling. Respiratory: Positive for dyspnea, PND, wheezing, and nonproductive cough. Gastrointestinal: Denies nausea, vomiting, diarrhea, abdominal pain or dark/bloody stools. Early satiety as above. Genitourinary: Denies urgency, dysuria or hematuria. Musculoskeletal: Denies gait disturbance, falls, myalgias or arthralgias. Integumentary: Denies rash or ulcerations. Neurological: Denies dizziness, syncope, numbness or tingling  Psychiatric: Denies anxiety or depression. Endocrine: Denies temperature intolerance or recent weight change. Hematologic/Lymphatic: Denies abnormal bruising or bleeding. N    Physical Exam:     /77   Pulse 80   Temp 97.5 °F (36.4 °C)   Resp 21   Ht 5' 7\" (1.702 m)   Wt 165 lb (74.8 kg)   SpO2 99%   BMI 25.84 kg/m²   CONST:  Well developed, well nourished elderly gentleman who appears of stated age. Awake, alert and cooperative. No apparent distress. HEENT:   Head- Normocephalic, atraumatic   Eyes- Conjunctivae pink  Throat- Oral mucosa pink and moist  Neck-  No stridor or carotid bruit. + JVD  CHEST: Chest symmetrical and non-tender to palpation. Respirations unlabored.    RESPIRATORY: Coarse breath sounds and faint expiratory wheezing anterior lung fields, breath sounds diminished in bases   CARDIOVASCULAR:     Heart Ausculation- Regular rate and rhythm, no murmur, s3, s4 or rub   PV: No lower extremity edema. No varicosities. Pedal pulses palpable  ABDOMEN: Soft, non-tender to light palpation. Bowel sounds present. No palpable masses   MS: Good muscle strength and tone. No atrophy or abnormal movements. : Deferred  SKIN: Warm and dry   NEURO / PSYCH: Oriented to person, place and time. Speech clear and appropriate. Follows all commands. Pleasant affect     Telemetry:  SR/ST, 110s    ECG: SR with bifascicular block       Intake/Output Summary (Last 24 hours) at 12/30/2022 0812  Last data filed at 12/30/2022 0223  Gross per 24 hour   Intake --   Output 1500 ml   Net -1500 ml       Labs:   CBC:   Recent Labs     12/29/22 2205   WBC 4.7   HGB 12.9   HCT 38.8        BMP:   Recent Labs     12/29/22 2205 12/30/22  0441    143   K 4.4 4.1   CO2 21* 24   BUN 18 15   CREATININE 0.9 0.8   LABGLOM >60 >60   CALCIUM 10.4* 10.7*     Mag:   Recent Labs     12/29/22 2205   MG 2.1       HgA1c:   Lab Results   Component Value Date    LABA1C 4.8 07/02/2019       proBNP:   Recent Labs     12/29/22 2205   PROBNP 6,808*       CARDIAC ENZYMES:  Recent Labs     12/29/22 2205 12/30/22  0033   TROPHS 25* 24*      FASTING LIPID PANEL:  Lab Results   Component Value Date/Time    CHOL 207 04/20/2021 12:00 AM     04/20/2021 12:00 AM    LDLCALC 91 04/20/2021 12:00 AM    TRIG 40 04/20/2021 12:00 AM     LIVER PROFILE:  Recent Labs     12/29/22 2205   AST 26   ALT 41*   LABALBU 4.2     CXR 12/29/2022:  Impression:     Mild congestive heart failure      Assessment and Recommendations to follow by Dr. Parris Zepeda. Electronically signed by EMANUEL Young on 12/30/2022 at 8:12 AM      I have personally spent 46 minutes in addition to the above and more than 50% of the total time involved in performing this consultation. I have personally and separately seen and evaluated the patient.   I personally and separately obtained the history and performed the physical exam.  I personally reviewed all of the above labs, imaging, history, review of systems, and data. All of the assessments and recommendations are personally from me. All of the above cardiac medical decisions are personally from me. Please see my additional contributions to the history, physical exam, assessment, and recommendations below. History of chief complaint:  He has a history of asthma and has chronic baseline dyspnea. However, he states that over the past month he has had a significant increase in his dyspnea to where he cannot perform any activities without dyspnea. The past 2 weeks this is significantly increased even greater. He now states that he gets short of breath even with eating. He denies any lower extremity edema. He states that recently his inhalers were changed but did not help. He denies orthopnea. He states that occasionally he will have a sharp left-sided chest pain that predominantly occurs at rest and last for about 10 seconds. Review of systems:     Heart: as above   Lungs: as above   Eyes: denies changes in vision or discharge. Ears: denies changes in hearing or pain. Nose: denies epistaxis or masses   Throat: denies sore throat or trouble swallowing. Neuro: denies numbness, tingling, tremors. Skin: denies rashes or itching. : denies hematuria, dysuria   GI: denies vomiting, diarrhea   Psych: denies mood changed, anxiety, depression. Physical exam:  /78   Pulse (!) 106   Temp 97.5 °F (36.4 °C)   Resp 24   Ht 5' 7\" (1.702 m)   Wt 165 lb (74.8 kg)   SpO2 96%   BMI 25.84 kg/m²   Constitutional: A&O x3, communicates well, no acute distress. Eyes: extraocular muscles intact, PERRL. Normal lids & conjunctiva. No icterus. ENT: clear, no bleeding. No external masses. Lips normal formation. Neck: supple, full ROM, + JVD, no bruits, no lymphadenopathy. No masses. trachea midline.   Heart: regular rate & rhythm, normal S1 & S2, no abnormal murmurs. No heave. Lungs: Poor air movement. Right greater than left basilar rales. No accessory muscles. Abd: soft, non-tender. Normal bowel sounds. Neuro: Full ROM X 4, EOMI, no tremors. EXT: Very mild bilateral lower extremity edema  Skin: warm, dry, intact. Good turgor. Psych: A&O x 3, normal behavior, not anxious. Patient seen and examined. Chart, labs & data reviewed. A:  Increasing dyspnea and shortness of breath. Clinically pulmonary edema. There is an ECG from 12-21 which shows atrial fibrillation with RVR. He may have developed a tachycardia induced cardiomyopathy. Chronic right bundle branch block. Unchanged. Atypical chest discomfort. Prostate cancer. Bipolar      Rec:  IV diuresis  Follow lites and creatinine. Anticoagulation for the paroxysmal atrial fibrillation. Lexiscan Cardiolite stress test.  Echocardiogram.    Electronically signed by Prashanth Tavera DO on 12/30/2022 at 1:36 PM    Note: This report was completed using computerized voice recognition software. Every effort has been made to ensure accuracy, however; and invert and computerized transcription errors may be present.

## 2022-12-31 LAB
ANION GAP SERPL CALCULATED.3IONS-SCNC: 11 MMOL/L (ref 7–16)
BUN BLDV-MCNC: 27 MG/DL (ref 6–23)
CALCIUM SERPL-MCNC: 10.7 MG/DL (ref 8.6–10.2)
CHLORIDE BLD-SCNC: 104 MMOL/L (ref 98–107)
CO2: 25 MMOL/L (ref 22–29)
CREAT SERPL-MCNC: 1.1 MG/DL (ref 0.7–1.2)
GFR SERPL CREATININE-BSD FRML MDRD: >60 ML/MIN/1.73
GLUCOSE BLD-MCNC: 96 MG/DL (ref 74–99)
LV EF: 38 %
LVEF MODALITY: NORMAL
POTASSIUM REFLEX MAGNESIUM: 3.7 MMOL/L (ref 3.5–5)
POTASSIUM SERPL-SCNC: 3.7 MMOL/L (ref 3.5–5)
SODIUM BLD-SCNC: 140 MMOL/L (ref 132–146)

## 2022-12-31 PROCEDURE — 94640 AIRWAY INHALATION TREATMENT: CPT

## 2022-12-31 PROCEDURE — 83970 ASSAY OF PARATHORMONE: CPT

## 2022-12-31 PROCEDURE — 93306 TTE W/DOPPLER COMPLETE: CPT

## 2022-12-31 PROCEDURE — 2700000000 HC OXYGEN THERAPY PER DAY

## 2022-12-31 PROCEDURE — 80048 BASIC METABOLIC PNL TOTAL CA: CPT

## 2022-12-31 PROCEDURE — 99233 SBSQ HOSP IP/OBS HIGH 50: CPT | Performed by: INTERNAL MEDICINE

## 2022-12-31 PROCEDURE — 6360000002 HC RX W HCPCS: Performed by: INTERNAL MEDICINE

## 2022-12-31 PROCEDURE — 6370000000 HC RX 637 (ALT 250 FOR IP): Performed by: INTERNAL MEDICINE

## 2022-12-31 PROCEDURE — 36415 COLL VENOUS BLD VENIPUNCTURE: CPT

## 2022-12-31 PROCEDURE — 2060000000 HC ICU INTERMEDIATE R&B

## 2022-12-31 RX ORDER — METOPROLOL SUCCINATE 25 MG/1
12.5 TABLET, EXTENDED RELEASE ORAL 2 TIMES DAILY
Status: DISCONTINUED | OUTPATIENT
Start: 2022-12-31 | End: 2023-01-04 | Stop reason: HOSPADM

## 2022-12-31 RX ADMIN — ARFORMOTEROL TARTRATE 15 MCG: 15 SOLUTION RESPIRATORY (INHALATION) at 20:26

## 2022-12-31 RX ADMIN — METOPROLOL SUCCINATE 12.5 MG: 25 TABLET, EXTENDED RELEASE ORAL at 20:32

## 2022-12-31 RX ADMIN — FUROSEMIDE 40 MG: 10 INJECTION, SOLUTION INTRAMUSCULAR; INTRAVENOUS at 09:22

## 2022-12-31 RX ADMIN — PANTOPRAZOLE SODIUM 40 MG: 40 TABLET, DELAYED RELEASE ORAL at 05:42

## 2022-12-31 RX ADMIN — FUROSEMIDE 40 MG: 10 INJECTION, SOLUTION INTRAMUSCULAR; INTRAVENOUS at 17:27

## 2022-12-31 RX ADMIN — BUDESONIDE 250 MCG: 0.25 SUSPENSION RESPIRATORY (INHALATION) at 11:41

## 2022-12-31 RX ADMIN — ENOXAPARIN SODIUM 70 MG: 100 INJECTION SUBCUTANEOUS at 20:31

## 2022-12-31 RX ADMIN — IPRATROPIUM BROMIDE 0.5 MG: 0.5 SOLUTION RESPIRATORY (INHALATION) at 16:25

## 2022-12-31 RX ADMIN — ARFORMOTEROL TARTRATE 15 MCG: 15 SOLUTION RESPIRATORY (INHALATION) at 11:40

## 2022-12-31 RX ADMIN — ENOXAPARIN SODIUM 70 MG: 100 INJECTION SUBCUTANEOUS at 09:23

## 2022-12-31 RX ADMIN — IPRATROPIUM BROMIDE 0.5 MG: 0.5 SOLUTION RESPIRATORY (INHALATION) at 11:43

## 2022-12-31 RX ADMIN — IPRATROPIUM BROMIDE 0.5 MG: 0.5 SOLUTION RESPIRATORY (INHALATION) at 20:27

## 2022-12-31 RX ADMIN — BUDESONIDE 250 MCG: 0.25 SUSPENSION RESPIRATORY (INHALATION) at 20:26

## 2022-12-31 RX ADMIN — METOPROLOL SUCCINATE 12.5 MG: 25 TABLET, EXTENDED RELEASE ORAL at 14:06

## 2022-12-31 ASSESSMENT — PAIN SCALES - GENERAL: PAINLEVEL_OUTOF10: 0

## 2022-12-31 NOTE — PROGRESS NOTES
Subjective:    Chief complaint:    Feeling better  No new issues    Objective:    BP (!) 93/59   Pulse 66   Temp 97 °F (36.1 °C) (Temporal)   Resp 16   Ht 5' 7\" (1.702 m)   Wt 163 lb (73.9 kg)   SpO2 98%   BMI 25.53 kg/m²   General : Awake ,alert,no distress. Heart:  RRR, no murmurs, gallops, or rubs. Lungs:  CTA bilaterally, no wheeze, rales or rhonchi  Abd: bowel sounds present, nontender, nondistended, no masses  Extrem:  No clubbing, cyanosis, or edema    CBC:   Lab Results   Component Value Date/Time    WBC 4.7 12/29/2022 10:05 PM    RBC 4.06 12/29/2022 10:05 PM    HGB 12.9 12/29/2022 10:05 PM    HCT 38.8 12/29/2022 10:05 PM    MCV 95.6 12/29/2022 10:05 PM    MCH 31.8 12/29/2022 10:05 PM    MCHC 33.2 12/29/2022 10:05 PM    RDW 13.7 12/29/2022 10:05 PM     12/29/2022 10:05 PM    MPV 10.0 12/29/2022 10:05 PM     BMP:    Lab Results   Component Value Date/Time     12/31/2022 07:13 AM    K 3.7 12/31/2022 07:13 AM    K 3.7 12/31/2022 07:13 AM     12/31/2022 07:13 AM    CO2 25 12/31/2022 07:13 AM    BUN 27 12/31/2022 07:13 AM    LABALBU 4.2 12/29/2022 10:05 PM    CREATININE 1.1 12/31/2022 07:13 AM    CALCIUM 10.7 12/31/2022 07:13 AM    GFRAA >60 12/15/2021 04:55 AM    LABGLOM >60 12/31/2022 07:13 AM    GLUCOSE 96 12/31/2022 07:13 AM     PT/INR:  No results found for: PROTIME, INR  Troponin:  No results found for: TROPONINI    No results for input(s): LABURIN in the last 72 hours. No results for input(s): BC in the last 72 hours. No results for input(s): Yue Corey in the last 72 hours.       Current Facility-Administered Medications:     metoprolol succinate (TOPROL XL) extended release tablet 12.5 mg, 12.5 mg, Oral, BID, Bo Braun MD    albuterol (PROVENTIL) nebulizer solution 5 mg, 5 mg, Nebulization, 4x Daily PRN, Poonam Jain MD, 5 mg at 12/30/22 1429    clonazePAM (KLONOPIN) tablet 0.25 mg, 0.25 mg, Oral, Q12H PRN, Poonam Jain MD    pantoprazole (PROTONIX) tablet 40 mg, 40 mg, Oral, QAM AC, Poonam Jain MD, 40 mg at 12/31/22 0542    ipratropium (ATROVENT) 0.02 % nebulizer solution 0.5 mg, 0.5 mg, Nebulization, 4x daily, Poonam Jain MD, 0.5 mg at 12/30/22 2022    budesonide (PULMICORT) nebulizer suspension 250 mcg, 250 mcg, Nebulization, BID, Poonam Jain MD, 250 mcg at 12/30/22 2022    Arformoterol Tartrate (BROVANA) nebulizer solution 15 mcg, 15 mcg, Nebulization, BID, Poonam Jain MD, 15 mcg at 12/30/22 2022    perflutren lipid microspheres (DEFINITY) injection 1.5 mL, 1.5 mL, IntraVENous, ONCE PRN, EMANUEL Lowe - CNS    furosemide (LASIX) injection 40 mg, 40 mg, IntraVENous, BID, Berl Kicks, DO, 40 mg at 12/31/22 3767    enoxaparin (LOVENOX) injection 70 mg, 1 mg/kg, SubCUTAneous, BID, Berl Kicks, DO, 70 mg at 12/31/22 2418    ADULT DIET; Regular; Low Sodium (2 gm); 2000 ml    NM MYOCARDIAL SPECT REST EXERCISE OR RX   Final Result      The myocardial perfusion imaging was abnormal with a small sized   moderate intensity fixed perfusion defect of the apex. Minimal   inducible ischemia noted. Overall left ventricular systolic function was abnormal without   regional wall motion abnormalities. Intermediate risk stress test given severe LV dysfunction. XR CHEST PORTABLE   Final Result   Mild congestive heart failure. Assessment:    Principal Problem:    Acute heart failure, unspecified heart failure type ([address]  Resolved Problems:    * No resolved hospital problems. *  Abnormal stress  Pafib      Plan:    Renal function stable  Stress results noted  For cath Monday  Echo done and await results  Cardiology input noted        Poonam Jain MD  10:52 AM  12/31/2022    NOTE: This report was transcribed using voice recognition software.  Every effort was made to ensure accuracy; however, inadvertent transcription errors may be present

## 2022-12-31 NOTE — PROGRESS NOTES
INPATIENT CARDIOLOGY FOLLOW-UP    Name: Emily Reid    Age: 68 y.o. Date of Admission: 12/29/2022  9:48 PM    Date of Service: 12/31/2022    Chief Complaint: Follow-up for CHF      Interim History:  No new overnight cardiac complaints except for dyspnea with minimal exertion and gets winded on walking to the bathroom. Still has a cough and wheezing. Denies any fever or chills. . Currently with no complaints of CP, palpitations, dizziness, or lightheadedness. SR on telemetry.     Review of Systems:   Cardiac: As per HPI  General: No fever, chills  Pulmonary: As per HPI  HEENT: No visual disturbances, difficult swallowing  GI: No nausea, vomiting  Endocrine: No thyroid disease or DM  Musculoskeletal: GONZALES x 4, no focal motor deficits  Skin: Intact, no rashes  Neuro/Psych: No headache or seizures    Problem List:  Patient Active Problem List   Diagnosis    Depression with anxiety    Moderate asthma without complication    Prostate cancer ([facility] [address]    Asthma exacerbation    Acute heart failure, unspecified heart failure type (HCC)       Allergies:  No Known Allergies    Current Medications:  Current Facility-Administered Medications   Medication Dose Route Frequency Provider Last Rate Last Admin    albuterol (PROVENTIL) nebulizer solution 5 mg  5 mg Nebulization 4x Daily PRN Ina Bradford MD   5 mg at 12/30/22 1429    clonazePAM (KLONOPIN) tablet 0.25 mg  0.25 mg Oral Q12H PRN Ina Bradford MD        pantoprazole (PROTONIX) tablet 40 mg  40 mg Oral QAM AC Ina Bradford MD   40 mg at 12/31/22 0542    ipratropium (ATROVENT) 0.02 % nebulizer solution 0.5 mg  0.5 mg Nebulization 4x daily Ina Bradford MD   0.5 mg at 12/30/22 2022    budesonide (PULMICORT) nebulizer suspension 250 mcg  250 mcg Nebulization BID Ina Bradford MD   250 mcg at 12/30/22 2022    Arformoterol Tartrate (BROVANA) nebulizer solution 15 mcg  15 mcg Nebulization BID Ina Bradford MD   15 mcg at 12/30/22 2022    perflutren lipid microspheres (DEFINITY) injection 1.5 mL  1.5 mL IntraVENous ONCE PRN Eliza Fernandez, EMANUEL - CNS        furosemide (LASIX) injection 40 mg  40 mg IntraVENous BID Ambar Guillen, DO   40 mg at 12/31/22 4834    enoxaparin (LOVENOX) injection 70 mg  1 mg/kg SubCUTAneous BID Ambar Guillen, DO   70 mg at 12/31/22 3207         Physical Exam:  BP (!) 93/59 Comment: nurse notified  Pulse 66   Temp 97 °F (36.1 °C) (Temporal)   Resp 16   Ht 5' 7\" (1.702 m)   Wt 163 lb (73.9 kg)   SpO2 98%   BMI 25.53 kg/m²   Wt Readings from Last 3 Encounters:   12/31/22 163 lb (73.9 kg)   05/03/22 163 lb (73.9 kg)   03/21/22 158 lb (71.7 kg)     Appearance: Awake, alert, no acute respiratory distress  Skin: Intact, no rash  Head: Normocephalic, atraumatic  Eyes: EOMI, no conjunctival erythema  ENMT: No pharyngeal erythema, MMM, no rhinorrhea  Neck: Supple, no elevated JVP, no carotid bruits  Lungs: Bilateral air entry is diminished with scattered wheezing  Cardiac: Regular rate and rhythm, +S1S2, no murmurs apparent  Abdomen: Soft, nontender, +bowel sounds  Extremities: Moves all extremities x 4, no lower extremity edema  Neurologic: No focal motor deficits apparent, normal mood and affect  Peripheral Pulses: Intact posterior tibial pulses bilaterally    Intake/Output:    Intake/Output Summary (Last 24 hours) at 12/31/2022 0928  Last data filed at 12/31/2022 2099  Gross per 24 hour   Intake --   Output 550 ml   Net -550 ml     No intake/output data recorded.     Laboratory Tests:  Recent Labs     12/30/22  0441 12/30/22  0949 12/31/22  0713    138 140   K 4.1 4.5 3.7    103 104   CO2 24 21* 25   BUN 15 16 27*   CREATININE 0.8 0.8 1.1   GLUCOSE 152* 149* 96   CALCIUM 10.7* 11.1* 10.7*     Lab Results   Component Value Date/Time    MG 2.1 12/29/2022 10:05 PM     Recent Labs     12/29/22  2205   ALKPHOS 98   ALT 41*   AST 26   PROT 6.7   BILITOT 0.4   LABALBU 4.2     Recent Labs 12/29/22  2205   WBC 4.7   RBC 4.06   HGB 12.9   HCT 38.8   MCV 95.6   MCH 31.8   MCHC 33.2   RDW 13.7      MPV 10.0     No results found for: CKTOTAL, CKMB, CKMBINDEX, TROPONINI  No results found for: INR, PROTIME  Lab Results   Component Value Date    TSH 1.940 10/21/2014     Lab Results   Component Value Date    LABA1C 4.8 07/02/2019     No results found for: EAG  Lab Results   Component Value Date    CHOL 207 (H) 04/20/2021    CHOL 147 07/02/2019    CHOL 170 03/10/2017     Lab Results   Component Value Date    TRIG 40 04/20/2021    TRIG 58 07/02/2019    TRIG 68 03/10/2017     Lab Results   Component Value Date     (A) 04/20/2021    HDL 60 07/02/2019    HDL 57 03/10/2017     Lab Results   Component Value Date    LDLCALC 91 04/20/2021    LDLCALC 76 07/02/2019    LDLCALC 99 03/10/2017     No results found for: LABVLDL, VLDL  No results found for: CHOLHDLRATIO    Cardiac Tests:  ECG: Normal sinus rhythm, right bundle branch block, left anterior fascicular block, bivascular block, LVH, abnormal EKG. Telemetry findings reviewed: Normal sinus rhythm heart rate in the 90s, no arrhythmias overnight. Labs and vitals were reviewed: As above blood pressure 93/59, heart rate 66 sats 98% on 4 L    Labs-BUNs/creatinine 27/1.1 K3.7 mag 2.1, proBNP 6808, troponin, high sensitive 25 and 24 CBC was normal.      Chest X-ray: Mild congestive heart failure      Echocardiogram: Pending      Lexiscan nuclear stress test-12/30/2022: The myocardial perfusion imaging was abnormal with a small sized   moderate intensity fixed perfusion defect of the apex. Minimal   inducible ischemia noted. Overall left ventricular systolic function was abnormal without   regional wall motion abnormalities.        Intermediate risk stress test given severe LV dysfunction, EF 26%         Cardiac catheterization:       ASSESSMENT:  Acute HFrEF, etiology unclear rule out ischemia, cannot rule out tachycardia induced cardiomyopathy, UOP not accurate  ACC/AHA stage C.,  NYHA functional class III,  History of paroxysmal atrial fibrillation with RVR, spontaneously converted to normal sinus rhythm. Chronic bifascicular block including right bundle branch block and left anterior fascicular block  Atypical chest pain resolved  History of prostate cancer  Bipolar disorder  Bronchospasm with possible bronchitis    Plan:   Continue diuretic therapy with the Lasix 40 mg IV every 12 hours with close monitoring of renal functions and electrolytes. Strict input output charting  Start on cardiac diet and restrict daily salt intake to less than 2 g  Will initiate on GDMT for HFrEF start on low-dose Toprol-XL. If he remains hemodynamically stable then will consider adding low-dose Entresto. Continue bronchodilators for bronchospasm as per primary service  Echocardiogram to assess LV function is pending, discussed with echo tech, will obtain echo this morning. Plan for cardiac cath on Monday to rule out obstructive CAD  Continue Lovenox for anticoagulation therapy  Further recommendation pending above      More  than 35 minutes  was spent counseling the patient, reviewing the medications, results, assessment and the rationale for the above recommendations. Vangie Katz MD., Amrita Pickering.   [facility]) Cardiology

## 2022-12-31 NOTE — PROGRESS NOTES
Patient converted to AFIB HR 120s at 1230 today. He was walking the halls at the time. Checked vitals and was able to give metoprolol as ordered.  1 hour and half post metoprolol vitals retaken, patient still in AFIB HR 90s Notified Cardio    Patient converted back to SR after 1611 today HR 80s

## 2023-01-01 ENCOUNTER — TELEPHONE (OUTPATIENT)
Dept: NON INVASIVE DIAGNOSTICS | Age: 78
End: 2023-01-01

## 2023-01-01 ENCOUNTER — HOSPITAL ENCOUNTER (EMERGENCY)
Age: 78
End: 2023-08-28
Attending: EMERGENCY MEDICINE
Payer: OTHER GOVERNMENT

## 2023-01-01 DIAGNOSIS — I46.9 CARDIAC ARREST (HCC): Primary | ICD-10-CM

## 2023-01-01 LAB
ANION GAP SERPL CALCULATED.3IONS-SCNC: 13 MMOL/L (ref 7–16)
BUN BLDV-MCNC: 27 MG/DL (ref 6–23)
CALCIUM SERPL-MCNC: 10.6 MG/DL (ref 8.6–10.2)
CHLORIDE BLD-SCNC: 101 MMOL/L (ref 98–107)
CO2: 21 MMOL/L (ref 22–29)
CREAT SERPL-MCNC: 1.1 MG/DL (ref 0.7–1.2)
GFR SERPL CREATININE-BSD FRML MDRD: >60 ML/MIN/1.73
GLUCOSE BLD-MCNC: 87 MG/DL (ref 74–99)
PARATHYROID HORMONE INTACT: 108 PG/ML (ref 15–65)
POTASSIUM REFLEX MAGNESIUM: 4.6 MMOL/L (ref 3.5–5)
POTASSIUM SERPL-SCNC: 4.6 MMOL/L (ref 3.5–5)
PRO-BNP: 3280 PG/ML (ref 0–450)
SODIUM BLD-SCNC: 135 MMOL/L (ref 132–146)

## 2023-01-01 PROCEDURE — 2580000003 HC RX 258

## 2023-01-01 PROCEDURE — 6370000000 HC RX 637 (ALT 250 FOR IP): Performed by: INTERNAL MEDICINE

## 2023-01-01 PROCEDURE — 96376 TX/PRO/DX INJ SAME DRUG ADON: CPT

## 2023-01-01 PROCEDURE — 80048 BASIC METABOLIC PNL TOTAL CA: CPT

## 2023-01-01 PROCEDURE — 94640 AIRWAY INHALATION TREATMENT: CPT

## 2023-01-01 PROCEDURE — 2700000000 HC OXYGEN THERAPY PER DAY

## 2023-01-01 PROCEDURE — 99233 SBSQ HOSP IP/OBS HIGH 50: CPT | Performed by: INTERNAL MEDICINE

## 2023-01-01 PROCEDURE — 99285 EMERGENCY DEPT VISIT HI MDM: CPT

## 2023-01-01 PROCEDURE — 36415 COLL VENOUS BLD VENIPUNCTURE: CPT

## 2023-01-01 PROCEDURE — 6360000002 HC RX W HCPCS: Performed by: EMERGENCY MEDICINE

## 2023-01-01 PROCEDURE — 2060000000 HC ICU INTERMEDIATE R&B

## 2023-01-01 PROCEDURE — 83880 ASSAY OF NATRIURETIC PEPTIDE: CPT

## 2023-01-01 PROCEDURE — 31500 INSERT EMERGENCY AIRWAY: CPT

## 2023-01-01 PROCEDURE — 96374 THER/PROPH/DIAG INJ IV PUSH: CPT

## 2023-01-01 PROCEDURE — 6360000002 HC RX W HCPCS

## 2023-01-01 PROCEDURE — 92950 HEART/LUNG RESUSCITATION CPR: CPT

## 2023-01-01 PROCEDURE — 6360000002 HC RX W HCPCS: Performed by: INTERNAL MEDICINE

## 2023-01-01 RX ORDER — EPINEPHRINE IN SOD CHLOR,ISO 1 MG/10 ML
SYRINGE (ML) INTRAVENOUS DAILY PRN
Status: COMPLETED | OUTPATIENT
Start: 2023-01-01 | End: 2023-01-01

## 2023-01-01 RX ORDER — ONDANSETRON 2 MG/ML
4 INJECTION INTRAMUSCULAR; INTRAVENOUS EVERY 6 HOURS PRN
Status: DISCONTINUED | OUTPATIENT
Start: 2023-01-01 | End: 2023-01-04 | Stop reason: HOSPADM

## 2023-01-01 RX ADMIN — FUROSEMIDE 40 MG: 10 INJECTION, SOLUTION INTRAMUSCULAR; INTRAVENOUS at 18:14

## 2023-01-01 RX ADMIN — CLONAZEPAM 0.25 MG: 0.5 TABLET ORAL at 01:57

## 2023-01-01 RX ADMIN — IPRATROPIUM BROMIDE 0.5 MG: 0.5 SOLUTION RESPIRATORY (INHALATION) at 12:15

## 2023-01-01 RX ADMIN — FUROSEMIDE 40 MG: 10 INJECTION, SOLUTION INTRAMUSCULAR; INTRAVENOUS at 08:55

## 2023-01-01 RX ADMIN — IPRATROPIUM BROMIDE 0.5 MG: 0.5 SOLUTION RESPIRATORY (INHALATION) at 08:28

## 2023-01-01 RX ADMIN — EPINEPHRINE 1 MG: 0.1 INJECTION INTRACARDIAC; INTRAVENOUS at 10:43

## 2023-01-01 RX ADMIN — CLONAZEPAM 0.25 MG: 0.5 TABLET ORAL at 15:29

## 2023-01-01 RX ADMIN — ARFORMOTEROL TARTRATE 15 MCG: 15 SOLUTION RESPIRATORY (INHALATION) at 20:32

## 2023-01-01 RX ADMIN — ARFORMOTEROL TARTRATE 15 MCG: 15 SOLUTION RESPIRATORY (INHALATION) at 08:28

## 2023-01-01 RX ADMIN — EPINEPHRINE 1 MG: 0.1 INJECTION INTRACARDIAC; INTRAVENOUS at 10:39

## 2023-01-01 RX ADMIN — IPRATROPIUM BROMIDE 0.5 MG: 0.5 SOLUTION RESPIRATORY (INHALATION) at 16:51

## 2023-01-01 RX ADMIN — BUDESONIDE 250 MCG: 0.25 SUSPENSION RESPIRATORY (INHALATION) at 08:28

## 2023-01-01 RX ADMIN — EPINEPHRINE 1 MG: 0.1 INJECTION INTRACARDIAC; INTRAVENOUS at 10:47

## 2023-01-01 RX ADMIN — METOPROLOL SUCCINATE 12.5 MG: 25 TABLET, EXTENDED RELEASE ORAL at 08:55

## 2023-01-01 RX ADMIN — PANTOPRAZOLE SODIUM 40 MG: 40 TABLET, DELAYED RELEASE ORAL at 05:48

## 2023-01-01 RX ADMIN — ENOXAPARIN SODIUM 70 MG: 100 INJECTION SUBCUTANEOUS at 08:55

## 2023-01-01 RX ADMIN — IPRATROPIUM BROMIDE 0.5 MG: 0.5 SOLUTION RESPIRATORY (INHALATION) at 20:32

## 2023-01-01 RX ADMIN — BUDESONIDE 250 MCG: 0.25 SUSPENSION RESPIRATORY (INHALATION) at 20:32

## 2023-01-01 ASSESSMENT — PAIN SCALES - GENERAL: PAINLEVEL_OUTOF10: 0

## 2023-01-01 NOTE — PROGRESS NOTES
INPATIENT CARDIOLOGY FOLLOW-UP    Name: Genaro Aguero    Age: 68 y.o. Date of Admission: 12/29/2022  9:48 PM    Date of Service: 1/1/2023    Chief Complaint: Follow-up for CHF      Interim History:  No new overnight cardiac complaints except for increased dyspnea  this AM and feeling better with nebs. Felt great yesterday. Still has a cough and wheezing. Denies any fever or chills. . Currently with no complaints of CP, palpitations, dizziness, or lightheadedness. Had AF with RVR yesterday afternoon and converted back to SR on telemetry.     Review of Systems:   Cardiac: As per HPI  General: No fever, chills  Pulmonary: As per HPI  HEENT: No visual disturbances, difficult swallowing  GI: No nausea, vomiting  Endocrine: No thyroid disease or DM  Musculoskeletal: GONZALES x 4, no focal motor deficits  Skin: Intact, no rashes  Neuro/Psych: No headache or seizures    Problem List:  Patient Active Problem List   Diagnosis    Depression with anxiety    Moderate asthma without complication    Prostate cancer ([facility] [address]    Asthma exacerbation    Acute heart failure, unspecified heart failure type (HCC)       Allergies:  No Known Allergies    Current Medications:  Current Facility-Administered Medications   Medication Dose Route Frequency Provider Last Rate Last Admin    metoprolol succinate (TOPROL XL) extended release tablet 12.5 mg  12.5 mg Oral BID Bo Braun MD   12.5 mg at 01/01/23 0855    albuterol (PROVENTIL) nebulizer solution 5 mg  5 mg Nebulization 4x Daily PRN Poonam Jain MD   5 mg at 12/30/22 1429    clonazePAM (KLONOPIN) tablet 0.25 mg  0.25 mg Oral Q12H PRN Poonam Jain MD   0.25 mg at 01/01/23 0157    pantoprazole (PROTONIX) tablet 40 mg  40 mg Oral QAM AC Poonam Jain MD   40 mg at 01/01/23 0548    ipratropium (ATROVENT) 0.02 % nebulizer solution 0.5 mg  0.5 mg Nebulization 4x daily Poonam Jain MD   0.5 mg at 01/01/23 0828    budesonide (PULMICORT) nebulizer suspension 250 mcg  250 mcg Nebulization BID Bogdan Goncalves MD   250 mcg at 01/01/23 9758    Arformoterol Tartrate (BROVANA) nebulizer solution 15 mcg  15 mcg Nebulization BID Bogdan Goncalves MD   15 mcg at 01/01/23 5429    perflutren lipid microspheres (DEFINITY) injection 1.5 mL  1.5 mL IntraVENous ONCE PRN Leala Echevaria, APRN - CNS        furosemide (LASIX) injection 40 mg  40 mg IntraVENous BID Tc Cea, DO   40 mg at 01/01/23 0855    enoxaparin (LOVENOX) injection 70 mg  1 mg/kg SubCUTAneous BID Tc Cea, DO   70 mg at 01/01/23 0054         Physical Exam:  /71   Pulse 72   Temp 98.6 °F (37 °C)   Resp 16   Ht 5' 7\" (1.702 m)   Wt 163 lb (73.9 kg)   SpO2 100%   BMI 25.53 kg/m²   Wt Readings from Last 3 Encounters:   12/31/22 163 lb (73.9 kg)   05/03/22 163 lb (73.9 kg)   03/21/22 158 lb (71.7 kg)     Appearance: Awake, alert, no acute respiratory distress  Skin: Intact, no rash  Head: Normocephalic, atraumatic  Eyes: EOMI, no conjunctival erythema  ENMT: No pharyngeal erythema, MMM, no rhinorrhea  Neck: Supple, no elevated JVP, no carotid bruits  Lungs: Bilateral air entry is diminished with scattered wheezing  Cardiac: Regular rate and rhythm, +S1S2, no murmurs apparent  Abdomen: Soft, nontender, +bowel sounds  Extremities: Moves all extremities x 4, no lower extremity edema  Neurologic: No focal motor deficits apparent, normal mood and affect  Peripheral Pulses: Intact posterior tibial pulses bilaterally    Intake/Output:    Intake/Output Summary (Last 24 hours) at 1/1/2023 0904  Last data filed at 1/1/2023 0841  Gross per 24 hour   Intake 580 ml   Output 1970 ml   Net -1390 ml     I/O this shift:  In: -   Out: 120 [Urine:120]    Laboratory Tests:  Recent Labs     12/30/22  0949 12/31/22  0713 01/01/23  0543    140 135   K 4.5 3.7  3.7 4.6  4.6    104 101   CO2 21* 25 21*   BUN 16 27* 27*   CREATININE 0.8 1.1 1.1   GLUCOSE 149* 96 87   CALCIUM 11.1* 10.7* 10.6*     Lab Results   Component Value Date/Time    MG 2.1 12/29/2022 10:05 PM     Recent Labs     12/29/22  2205   ALKPHOS 98   ALT 41*   AST 26   PROT 6.7   BILITOT 0.4   LABALBU 4.2     Recent Labs     12/29/22  2205   WBC 4.7   RBC 4.06   HGB 12.9   HCT 38.8   MCV 95.6   MCH 31.8   MCHC 33.2   RDW 13.7      MPV 10.0     No results found for: CKTOTAL, CKMB, CKMBINDEX, TROPONINI  No results found for: INR, PROTIME  Lab Results   Component Value Date    TSH 1.940 10/21/2014     Lab Results   Component Value Date    LABA1C 4.8 07/02/2019     No results found for: EAG  Lab Results   Component Value Date    CHOL 207 (H) 04/20/2021    CHOL 147 07/02/2019    CHOL 170 03/10/2017     Lab Results   Component Value Date    TRIG 40 04/20/2021    TRIG 58 07/02/2019    TRIG 68 03/10/2017     Lab Results   Component Value Date     (A) 04/20/2021    HDL 60 07/02/2019    HDL 57 03/10/2017     Lab Results   Component Value Date    LDLCALC 91 04/20/2021    LDLCALC 76 07/02/2019    LDLCALC 99 03/10/2017     No results found for: LABVLDL, VLDL  No results found for: CHOLHDLRATIO    Cardiac Tests:  ECG: Normal sinus rhythm, right bundle branch block, left anterior fascicular block, bivascular block, LVH, abnormal EKG. Telemetry findings reviewed: Normal sinus rhythm heart rate in the 50s and 60s s, patient had a an A. fib with RVR yesterday and spontaneously converted back to normal sinus rhythm    Labs and vitals were reviewed: As above blood pressure 103/71, heart rate 72 sats 100% on 4 L    Labs-BUNs/creatinine 27/1.1>>27/1.1 K3.7>>4.6, mag 2.1, proBNP 6808>>3280, troponin, high sensitive 25 and 24 CBC was normal.      Chest X-ray: Mild congestive heart failure      Echocardiogram- 12/31/22:    Mildly dilated left ventricle. Ejection fraction is visually estimated at 35-40%. Overall ejection fraction moderately decreased . Anterior and lateral walls are severely hypokinetic.    Moderate eccentric hypertrophy. Stage II diastolic dysfunction. The left atrium is mildly dilated. Normal right ventricular size and function. TAPSE 25 mm. Mild-to-moderate mitral regurgitation with posteriorly directed jet. No hemodynamically significant aortic stenosis is present. MILAGRO by direct   planimetry is 4.4 cm2. Moderate aortic regurgitation is noted. Mild tricuspid regurgitation. RVSP is 46 mmHg. Pulmonary hypertension is mild . No evidence for hemodynamically significant pericardial effusion. Lexiscan nuclear stress test-12/30/2022: The myocardial perfusion imaging was abnormal with a small sized   moderate intensity fixed perfusion defect of the apex. Minimal   inducible ischemia noted. Overall left ventricular systolic function was abnormal without   regional wall motion abnormalities. Intermediate risk stress test given severe LV dysfunction, EF 26%         Cardiac catheterization:       ASSESSMENT:  Acute HFrEF, etiology unclear rule out ischemia, cannot rule out tachycardia induced cardiomyopathy, UOP not accurate, UOP 2.2 L  ACC/AHA stage C.,  NYHA functional class III,  New LV dysfunction. EF 35-40%  History of paroxysmal atrial fibrillation with RVR, spontaneously converted to normal sinus rhythm. Chronic bifascicular block including right bundle branch block and left anterior fascicular block  Atypical chest pain resolved  History of prostate cancer  Bipolar disorder  Bronchospasm with possible bronchitis    Plan:   Continue diuretic therapy with the Lasix 40 mg IV every 12 hours with close monitoring of renal functions and electrolytes. We will hold diuretics in a.m. Strict input output charting  Continue cardiac diet and restrict daily salt intake to less than 2 g  Initiated GDMT for HFrEF continue low-dose Toprol-XL 12.5 mg p.o. twice daily. If he remains hemodynamically stable then will consider adding low-dose Entresto.   For develops any further episodes of A. fib with RVR then will consider adding digoxin.  Continue bronchodilators for bronchospasm as per primary service  Echocardiogram results reviewed, as above and discussed with the patient.  Has new LV dysfunction with an EF of 35 to 40%.  Keep him n.p.o. after midnight plan for cardiac cath on Monday to rule out obstructive CAD  Continue Lovenox for anticoagulation therapy  Further recommendation pending above      More  than 35 minutes  was spent counseling the patient, reviewing the medications, results, assessment and the rationale for the above recommendations.     Caitlin Aguilar MD., FACC, FASE.  [facility]

## 2023-01-01 NOTE — PROGRESS NOTES
Subjective:    Chief complaint:  Breathing is stable  No chest pain currently    Objective:    /71   Pulse 72   Temp 98.6 °F (37 °C)   Resp 16   Ht 5' 7\" (1.702 m)   Wt 163 lb (73.9 kg)   SpO2 100%   BMI 25.53 kg/m²   General : Awake ,alert,no distress. Heart:  RRR, no murmurs, gallops, or rubs. Lungs:  CTA bilaterally, no wheeze, rales or rhonchi  Abd: bowel sounds present, nontender, nondistended, no masses  Extrem:  No clubbing, cyanosis, or edema    CBC:   Lab Results   Component Value Date/Time    WBC 4.7 12/29/2022 10:05 PM    RBC 4.06 12/29/2022 10:05 PM    HGB 12.9 12/29/2022 10:05 PM    HCT 38.8 12/29/2022 10:05 PM    MCV 95.6 12/29/2022 10:05 PM    MCH 31.8 12/29/2022 10:05 PM    MCHC 33.2 12/29/2022 10:05 PM    RDW 13.7 12/29/2022 10:05 PM     12/29/2022 10:05 PM    MPV 10.0 12/29/2022 10:05 PM     BMP:    Lab Results   Component Value Date/Time     01/01/2023 05:43 AM    K 4.6 01/01/2023 05:43 AM    K 4.6 01/01/2023 05:43 AM     01/01/2023 05:43 AM    CO2 21 01/01/2023 05:43 AM    BUN 27 01/01/2023 05:43 AM    LABALBU 4.2 12/29/2022 10:05 PM    CREATININE 1.1 01/01/2023 05:43 AM    CALCIUM 10.6 01/01/2023 05:43 AM    GFRAA >60 12/15/2021 04:55 AM    LABGLOM >60 01/01/2023 05:43 AM    GLUCOSE 87 01/01/2023 05:43 AM     PT/INR:  No results found for: PROTIME, INR  Troponin:  No results found for: TROPONINI    No results for input(s): LABURIN in the last 72 hours. No results for input(s): BC in the last 72 hours. No results for input(s): [address] in the last 72 hours.       Current Facility-Administered Medications:     metoprolol succinate (TOPROL XL) extended release tablet 12.5 mg, 12.5 mg, Oral, BID, Haris Rg MD, 12.5 mg at 01/01/23 0855    albuterol (PROVENTIL) nebulizer solution 5 mg, 5 mg, Nebulization, 4x Daily PRN, Jaden Colby MD, 5 mg at 12/30/22 1429    clonazePAM (KLONOPIN) tablet 0.25 mg, 0.25 mg, Oral, Q12H PRN, Jaden Colby MD, 0.25 mg at 01/01/23 0157    pantoprazole (PROTONIX) tablet 40 mg, 40 mg, Oral, QAM AC, Roxann Belcher MD, 40 mg at 01/01/23 0548    ipratropium (ATROVENT) 0.02 % nebulizer solution 0.5 mg, 0.5 mg, Nebulization, 4x daily, Roxann Belcher MD, 0.5 mg at 01/01/23 1215    budesonide (PULMICORT) nebulizer suspension 250 mcg, 250 mcg, Nebulization, BID, Roxann Belcher MD, 250 mcg at 01/01/23 1512    Arformoterol Tartrate (BROVANA) nebulizer solution 15 mcg, 15 mcg, Nebulization, BID, Roxann Belhcer MD, 15 mcg at 01/01/23 7836    perflutren lipid microspheres (DEFINITY) injection 1.5 mL, 1.5 mL, IntraVENous, ONCE PRN, EMANUEL Cook - CNS    furosemide (LASIX) injection 40 mg, 40 mg, IntraVENous, BID, Patricia Manus, DO, 40 mg at 01/01/23 0855    enoxaparin (LOVENOX) injection 70 mg, 1 mg/kg, SubCUTAneous, BID, Patricia Manus, DO, 70 mg at 01/01/23 0586    ADULT DIET; Regular; Low Sodium (2 gm); 2000 ml  Diet NPO    NM MYOCARDIAL SPECT REST EXERCISE OR RX   Final Result      The myocardial perfusion imaging was abnormal with a small sized   moderate intensity fixed perfusion defect of the apex. Minimal   inducible ischemia noted. Overall left ventricular systolic function was abnormal without   regional wall motion abnormalities. Intermediate risk stress test given severe LV dysfunction. XR CHEST PORTABLE   Final Result   Mild congestive heart failure. Assessment:    Principal Problem:    Acute heart failure, unspecified heart failure type ([facility] [address]  Resolved Problems:    * No resolved hospital problems. *  Abnormal stress  Pafib      Plan: For heart cath on Monday  Echocardiogram results noted  Continue current care  Roxann Belcher MD  1:32 PM  1/1/2023    NOTE: This report was transcribed using voice recognition software.  Every effort was made to ensure accuracy; however, inadvertent transcription errors may be present

## 2023-01-01 NOTE — PROGRESS NOTES
During bedside report, night nurse informed this nurse patient had pills brought in from home. Patient stated that these were just vitamins. Wife came to visit and informed this nurse that patient had prescribed meds with him at bedside. Unclear on what and how much of the medications patient took. Patient sleeping at time of information received. Was able to identify medications and message was sent to Attending via [employer]. POA papers received from wife Marli. Copy made and placed in patients soft chart.

## 2023-01-02 LAB
ANION GAP SERPL CALCULATED.3IONS-SCNC: 11 MMOL/L (ref 7–16)
BUN BLDV-MCNC: 25 MG/DL (ref 6–23)
CALCIUM SERPL-MCNC: 10.3 MG/DL (ref 8.6–10.2)
CHLORIDE BLD-SCNC: 102 MMOL/L (ref 98–107)
CO2: 29 MMOL/L (ref 22–29)
CREAT SERPL-MCNC: 1 MG/DL (ref 0.7–1.2)
GFR SERPL CREATININE-BSD FRML MDRD: >60 ML/MIN/1.73
GLUCOSE BLD-MCNC: 81 MG/DL (ref 74–99)
MAGNESIUM: 2.1 MG/DL (ref 1.6–2.6)
POTASSIUM REFLEX MAGNESIUM: 3.5 MMOL/L (ref 3.5–5)
POTASSIUM SERPL-SCNC: 3.5 MMOL/L (ref 3.5–5)
SODIUM BLD-SCNC: 142 MMOL/L (ref 132–146)

## 2023-01-02 PROCEDURE — 6370000000 HC RX 637 (ALT 250 FOR IP): Performed by: INTERNAL MEDICINE

## 2023-01-02 PROCEDURE — 80048 BASIC METABOLIC PNL TOTAL CA: CPT

## 2023-01-02 PROCEDURE — 94640 AIRWAY INHALATION TREATMENT: CPT

## 2023-01-02 PROCEDURE — 6360000002 HC RX W HCPCS: Performed by: INTERNAL MEDICINE

## 2023-01-02 PROCEDURE — APPSS30 APP SPLIT SHARED TIME 16-30 MINUTES: Performed by: CLINICAL NURSE SPECIALIST

## 2023-01-02 PROCEDURE — 36415 COLL VENOUS BLD VENIPUNCTURE: CPT

## 2023-01-02 PROCEDURE — 83735 ASSAY OF MAGNESIUM: CPT

## 2023-01-02 PROCEDURE — 99233 SBSQ HOSP IP/OBS HIGH 50: CPT | Performed by: INTERNAL MEDICINE

## 2023-01-02 PROCEDURE — 2700000000 HC OXYGEN THERAPY PER DAY

## 2023-01-02 PROCEDURE — 2060000000 HC ICU INTERMEDIATE R&B

## 2023-01-02 RX ORDER — FUROSEMIDE 10 MG/ML
40 INJECTION INTRAMUSCULAR; INTRAVENOUS DAILY
Status: DISCONTINUED | OUTPATIENT
Start: 2023-01-04 | End: 2023-01-04

## 2023-01-02 RX ADMIN — IPRATROPIUM BROMIDE 0.5 MG: 0.5 SOLUTION RESPIRATORY (INHALATION) at 09:26

## 2023-01-02 RX ADMIN — CLONAZEPAM 0.25 MG: 0.5 TABLET ORAL at 20:41

## 2023-01-02 RX ADMIN — ARFORMOTEROL TARTRATE 15 MCG: 15 SOLUTION RESPIRATORY (INHALATION) at 09:24

## 2023-01-02 RX ADMIN — ARFORMOTEROL TARTRATE 15 MCG: 15 SOLUTION RESPIRATORY (INHALATION) at 20:24

## 2023-01-02 RX ADMIN — IPRATROPIUM BROMIDE 0.5 MG: 0.5 SOLUTION RESPIRATORY (INHALATION) at 13:30

## 2023-01-02 RX ADMIN — BUDESONIDE 250 MCG: 0.25 SUSPENSION RESPIRATORY (INHALATION) at 09:25

## 2023-01-02 RX ADMIN — IPRATROPIUM BROMIDE 0.5 MG: 0.5 SOLUTION RESPIRATORY (INHALATION) at 16:14

## 2023-01-02 RX ADMIN — FUROSEMIDE 40 MG: 10 INJECTION, SOLUTION INTRAMUSCULAR; INTRAVENOUS at 09:04

## 2023-01-02 RX ADMIN — BUDESONIDE 250 MCG: 0.25 SUSPENSION RESPIRATORY (INHALATION) at 20:24

## 2023-01-02 RX ADMIN — ENOXAPARIN SODIUM 70 MG: 100 INJECTION SUBCUTANEOUS at 09:04

## 2023-01-02 RX ADMIN — IPRATROPIUM BROMIDE 0.5 MG: 0.5 SOLUTION RESPIRATORY (INHALATION) at 20:24

## 2023-01-02 RX ADMIN — METOPROLOL SUCCINATE 12.5 MG: 25 TABLET, EXTENDED RELEASE ORAL at 09:04

## 2023-01-02 ASSESSMENT — PAIN SCALES - GENERAL: PAINLEVEL_OUTOF10: 0

## 2023-01-02 NOTE — PROGRESS NOTES
Hospital Medicine    Subjective:  pt alert conversive sched for heart cath tomorrow      Current Facility-Administered Medications:     ondansetron (ZOFRAN) injection 4 mg, 4 mg, IntraVENous, Q6H PRN, Yumi Timmons MD    metoprolol succinate (TOPROL XL) extended release tablet 12.5 mg, 12.5 mg, Oral, BID, Ward Fine MD, 12.5 mg at 01/02/23 0904    albuterol (PROVENTIL) nebulizer solution 5 mg, 5 mg, Nebulization, 4x Daily PRN, Yumi Timmons MD, 5 mg at 12/30/22 1429    clonazePAM (KLONOPIN) tablet 0.25 mg, 0.25 mg, Oral, Q12H PRN, Yumi Timmons MD, 0.25 mg at 01/01/23 1529    pantoprazole (PROTONIX) tablet 40 mg, 40 mg, Oral, QAM AC, Yumi Timmons MD, 40 mg at 01/01/23 0548    ipratropium (ATROVENT) 0.02 % nebulizer solution 0.5 mg, 0.5 mg, Nebulization, 4x daily, Yumi Timmons MD, 0.5 mg at 01/01/23 2032    budesonide (PULMICORT) nebulizer suspension 250 mcg, 250 mcg, Nebulization, BID, uYmi Timmons MD, 250 mcg at 01/02/23 6804    Arformoterol Tartrate (BROVANA) nebulizer solution 15 mcg, 15 mcg, Nebulization, BID, Yumi Timmons MD, 15 mcg at 01/02/23 2952    perflutren lipid microspheres (DEFINITY) injection 1.5 mL, 1.5 mL, IntraVENous, ONCE PRN, EMANUEL Alford - CNS    furosemide (LASIX) injection 40 mg, 40 mg, IntraVENous, BID, Ambar Guillen, DO, 40 mg at 01/02/23 0904    enoxaparin (LOVENOX) injection 70 mg, 1 mg/kg, SubCUTAneous, BID, Ambar Guillen DO, 70 mg at 01/02/23 8444    Objective:    /63   Pulse 70   Temp 97.6 °F (36.4 °C) (Temporal)   Resp 17   Ht 5' 7\" (1.702 m)   Wt 164 lb 1.6 oz (74.4 kg)   SpO2 98%   BMI 25.70 kg/m²     Heart:  reg  Lungs:  ctab  Abd: + bs soft nontender  Extrem:  w/o edema    CBC with Differential:    Lab Results   Component Value Date/Time    WBC 4.7 12/29/2022 10:05 PM    RBC 4.06 12/29/2022 10:05 PM    HGB 12.9 12/29/2022 10:05 PM    HCT 38.8 12/29/2022 10:05 PM     12/29/2022 10:05 PM    MCV 95.6 12/29/2022 10:05 PM    MCH 31.8 12/29/2022 10:05 PM    MCHC 33.2 12/29/2022 10:05 PM    RDW 13.7 12/29/2022 10:05 PM    LYMPHOPCT 17.5 12/29/2022 10:05 PM    MONOPCT 10.4 12/29/2022 10:05 PM    BASOPCT 0.9 12/29/2022 10:05 PM    MONOSABS 0.49 12/29/2022 10:05 PM    LYMPHSABS 0.82 12/29/2022 10:05 PM    EOSABS 0.19 12/29/2022 10:05 PM    BASOSABS 0.04 12/29/2022 10:05 PM     CMP:    Lab Results   Component Value Date/Time     01/02/2023 06:13 AM    K 3.5 01/02/2023 06:13 AM    K 3.5 01/02/2023 06:13 AM     01/02/2023 06:13 AM    CO2 29 01/02/2023 06:13 AM    BUN 25 01/02/2023 06:13 AM    CREATININE 1.0 01/02/2023 06:13 AM    GFRAA >60 12/15/2021 04:55 AM    LABGLOM >60 01/02/2023 06:13 AM    GLUCOSE 81 01/02/2023 06:13 AM    PROT 6.7 12/29/2022 10:05 PM    LABALBU 4.2 12/29/2022 10:05 PM    CALCIUM 10.3 01/02/2023 06:13 AM    BILITOT 0.4 12/29/2022 10:05 PM    ALKPHOS 98 12/29/2022 10:05 PM    AST 26 12/29/2022 10:05 PM    ALT 41 12/29/2022 10:05 PM     Warfarin PT/INR:  No results found for: INR, PROTIME    Assessment:    Acute syst chf    Plan:   For heart cath tomorrow        Basilia Arellano DO  9:29 AM  1/2/2023

## 2023-01-02 NOTE — PROGRESS NOTES
INPATIENT CARDIOLOGY FOLLOW-UP    Name: Fiorella Gatica    Age: 68 y.o. Date of Admission: 12/29/2022  9:48 PM    Date of Service: 1/2/2023    Chief Complaint: Follow-up for CHF      Interim History:  No new overnight cardiac complaints. Today, he is feeling much better, he thinks his nebulizer treatments are helping his dyspnea. Today he is off oxygen and ambulating. Currently with no complaints of CP, palpitations, dizziness, or lightheadedness. Remains in SR on telemetry.     Review of Systems:   Cardiac: As per HPI  General: No fever, chills  Pulmonary: As per HPI  HEENT: No visual disturbances, difficult swallowing  GI: No nausea, vomiting  Endocrine: No thyroid disease or DM  Musculoskeletal: GONZALES x 4, no focal motor deficits  Skin: Intact, no rashes  Neuro/Psych: No headache or seizures    Problem List:  Patient Active Problem List   Diagnosis    Depression with anxiety    Moderate asthma without complication    Prostate cancer ([facility] [address]    Asthma exacerbation    Acute heart failure, unspecified heart failure type (HCC)       Allergies:  No Known Allergies    Current Medications:  Current Facility-Administered Medications   Medication Dose Route Frequency Provider Last Rate Last Admin    ondansetron (ZOFRAN) injection 4 mg  4 mg IntraVENous Q6H PRN Dudley Barrios MD        metoprolol succinate (TOPROL XL) extended release tablet 12.5 mg  12.5 mg Oral BID Rip Bassett MD   12.5 mg at 01/02/23 0904    albuterol (PROVENTIL) nebulizer solution 5 mg  5 mg Nebulization 4x Daily PRN Dudley Barrios MD   5 mg at 12/30/22 1429    clonazePAM (KLONOPIN) tablet 0.25 mg  0.25 mg Oral Q12H PRN Dudley Barrios MD   0.25 mg at 01/01/23 1529    pantoprazole (PROTONIX) tablet 40 mg  40 mg Oral QAM AC Dudley Barrios MD   40 mg at 01/01/23 0548    ipratropium (ATROVENT) 0.02 % nebulizer solution 0.5 mg  0.5 mg Nebulization 4x daily Dudley Barrios MD   0.5 mg at 01/02/23 6987 budesonide (PULMICORT) nebulizer suspension 250 mcg  250 mcg Nebulization BID May Cordova MD   250 mcg at 01/02/23 9355    Arformoterol Tartrate (BROVANA) nebulizer solution 15 mcg  15 mcg Nebulization BID May Cordova MD   15 mcg at 01/02/23 6028    perflutren lipid microspheres (DEFINITY) injection 1.5 mL  1.5 mL IntraVENous ONCE PRN Orquidea Wilkes, APRN - CNS        furosemide (LASIX) injection 40 mg  40 mg IntraVENous BID Asenath [place], DO   40 mg at 01/02/23 0904    enoxaparin (LOVENOX) injection 70 mg  1 mg/kg SubCUTAneous BID Asenath [place], DO   70 mg at 01/02/23 9773         Physical Exam:  /63   Pulse 70   Temp 97.6 °F (36.4 °C) (Temporal)   Resp 17   Ht 5' 7\" (1.702 m)   Wt 164 lb 1.6 oz (74.4 kg)   SpO2 96%   BMI 25.70 kg/m²   Wt Readings from Last 3 Encounters:   01/02/23 164 lb 1.6 oz (74.4 kg)   05/03/22 163 lb (73.9 kg)   03/21/22 158 lb (71.7 kg)     Appearance: Awake, alert, no acute respiratory distress  Skin: Intact, no rash  Head: Normocephalic, atraumatic  Eyes: EOMI, no conjunctival erythema  ENMT: No pharyngeal erythema, MMM, no rhinorrhea  Neck: Supple, no elevated JVP, no carotid bruits  Lungs: Bilateral air entry is diminished with scattered wheezing  Cardiac: Regular rate and rhythm, +S1S2, no murmurs apparent  Abdomen: Soft, nontender, +bowel sounds  Extremities: Moves all extremities x 4, no lower extremity edema  Neurologic: No focal motor deficits apparent, normal mood and affect  Peripheral Pulses: Intact posterior tibial pulses bilaterally    Intake/Output:    Intake/Output Summary (Last 24 hours) at 1/2/2023 1130  Last data filed at 1/2/2023 0544  Gross per 24 hour   Intake 180 ml   Output 950 ml   Net -770 ml     No intake/output data recorded.     Laboratory Tests:  Recent Labs     12/31/22  0713 01/01/23  0543 01/02/23  0613    135 142   K 3.7  3.7 4.6  4.6 3.5  3.5    101 102   CO2 25 21* 29   BUN 27* 27* 25*   CREATININE 1.1 1.1 1.0   GLUCOSE 96 87 81   CALCIUM 10.7* 10.6* 10.3*     Lab Results   Component Value Date/Time    MG 2.1 01/02/2023 06:13 AM     No results for input(s): ALKPHOS, ALT, AST, PROT, BILITOT, BILIDIR, LABALBU in the last 72 hours. No results for input(s): WBC, RBC, HGB, HCT, MCV, MCH, MCHC, RDW, PLT, MPV in the last 72 hours. No results found for: CKTOTAL, CKMB, CKMBINDEX, TROPONINI  No results found for: INR, PROTIME  Lab Results   Component Value Date    TSH 1.940 10/21/2014     Lab Results   Component Value Date    LABA1C 4.8 07/02/2019     No results found for: EAG  Lab Results   Component Value Date    CHOL 207 (H) 04/20/2021    CHOL 147 07/02/2019    CHOL 170 03/10/2017     Lab Results   Component Value Date    TRIG 40 04/20/2021    TRIG 58 07/02/2019    TRIG 68 03/10/2017     Lab Results   Component Value Date     (A) 04/20/2021    HDL 60 07/02/2019    HDL 57 03/10/2017     Lab Results   Component Value Date    LDLCALC 91 04/20/2021    LDLCALC 76 07/02/2019    LDLCALC 99 03/10/2017     No results found for: LABVLDL, VLDL  No results found for: CHOLHDLRATIO    Cardiac Tests:  ECG: Normal sinus rhythm, right bundle branch block, left anterior fascicular block, bivascular block, LVH, abnormal EKG. Telemetry findings reviewed: Normal sinus rhythm heart rate in the 80s, no further episodes of A. fib. Labs and vitals were reviewed: As above blood pressure 108/63, heart rate 70 sats 96 % on 4 L    Labs-BUNs/creatinine 27/1.1>>27/1.1>> 25/1 K3.7>>4.6, mag 2.1, proBNP 6808>>3280, troponin, high sensitive 25 and 24 CBC was normal.      Chest X-ray: Mild congestive heart failure      Echocardiogram- 12/31/22:    Mildly dilated left ventricle. Ejection fraction is visually estimated at 35-40%. Overall ejection fraction moderately decreased . Anterior and lateral walls are severely hypokinetic. Moderate eccentric hypertrophy. Stage II diastolic dysfunction.    The left atrium is mildly dilated. Normal right ventricular size and function. TAPSE 25 mm. Mild-to-moderate mitral regurgitation with posteriorly directed jet. No hemodynamically significant aortic stenosis is present. MILAGRO by direct   planimetry is 4.4 cm2. Moderate aortic regurgitation is noted. Mild tricuspid regurgitation. RVSP is 46 mmHg. Pulmonary hypertension is mild . No evidence for hemodynamically significant pericardial effusion. Lexiscan nuclear stress test-12/30/2022: The myocardial perfusion imaging was abnormal with a small sized   moderate intensity fixed perfusion defect of the apex. Minimal   inducible ischemia noted. Overall left ventricular systolic function was abnormal without   regional wall motion abnormalities. Intermediate risk stress test given severe LV dysfunction, EF 26%         Cardiac catheterization:       ASSESSMENT:  Acute HFrEF, etiology unclear rule out ischemia, cannot rule out tachycardia induced cardiomyopathy, UOP not accurate, UOP 2.2 L, appears nearly euvolemic  ACC/AHA stage C.,  NYHA functional class III>>II,  New LV dysfunction. EF 35-40%  History of paroxysmal atrial fibrillation with RVR, spontaneously converted to normal sinus rhythm. Chronic bifascicular block including right bundle branch block and left anterior fascicular block  Atypical chest pain resolved  History of prostate cancer  Bipolar disorder  Bronchospasm with possible bronchitis    Plan:   Decrease diuretic therapy with the Lasix 40 mg IV every 1 daily with close monitoring of renal functions and electrolytes. We will hold diuretics in a.m. Strict input output charting  Continue cardiac diet and restrict daily salt intake to less than 2 g  Continue GDMT for HFrEF continue low-dose Toprol-XL 12.5 mg p.o. twice daily. If he remains hemodynamically stable then will consider adding low-dose Entresto after the heart cath.   We will consider adding Aldactone and SGLT 2 inhibitor therapy as an outpatient. Continue bronchodilators for bronchospasm as per primary service  Echocardiogram results reviewed, as above and discussed with the patient. Has new LV dysfunction with an EF of 35 to 40%. Keep him n.p.o. after midnight plan for cardiac cath on Tuesday to rule out obstructive CAD, cath labs closed on 1/2/23. Continue Lovenox for anticoagulation therapy. We will hold a.m. dose of Lovenox in AM in preparation for heart cath. Further recommendation pending above      More  than 35 minutes  was spent counseling the patient, reviewing the medications, results, assessment and the rationale for the above recommendations. Moe Emery MD., Joseph Marti.   [facility]) Cardiology

## 2023-01-02 NOTE — PROGRESS NOTES
Inpatient Cardiology Progress Note     We are following the patient for CHF    Jose Miguel Cordova is a 68 y.o. male who was seen in initial consultation by Dr. Alanna Sanchez on December 30, 2022. SUBJECTIVE: Lying nearly flat in bed, denies chest discomfort and reports some improvement in breathing. He continues to have good urinary response to Lasix. Anxious regarding cardiac cath  OBJECTIVE: No apparent distress, on 4 liters nasal cannula. ROS:  Consist: Denies fevers, chills or night sweats  Heart: Denies chest pain, palpitations, lightheadedness, dizziness or syncope  Lungs: Denies cough, wheezing, orthopnea or PND  GI: Denies abdominal pain, vomiting or diarrhea  All others negative     PHYSICAL EXAM:   BP 94/60   Pulse 68   Temp 97.2 °F (36.2 °C) (Temporal)   Resp 16   Ht 5' 7\" (1.702 m)   Wt 164 lb 1.6 oz (74.4 kg)   SpO2 98%   BMI 25.70 kg/m²    B/P Range last 24 hours: Systolic (94YKJ), QPQ:74 , Min:85 , XZF:212    Diastolic (69COI), FWE:13, Min:55, Max:71  BP range last 12 hours 103/71 to 85/55  CONST: Well developed, well nourished elderly gentleman who appears stated age. Awake, alert and cooperative. No apparent acute distress  HEENT:   Head- Normocephalic, atraumatic   Eyes- Conjunctivae pink, anicteric  Throat- Oral mucosa pink and moist  Neck-  No stridor, jugular venous distention or carotid bruit  CHEST: Chest symmetrical and non-tender to palpation. Respirations unlabored. RESPIRATORY:  Breath sounds clear but diminished in bases   CARDIOVASCULAR:     Heart Ausculation- Regular rate and rhythm, no murmur. No s3, s4 or rub   PV: No lower extremity edema. No varicosities. Pedal pulses palpable  ABDOMEN: Soft, non-tender to light palpation. Bowel sounds present. No palpable masses   MS: Good muscle strength and tone. No atrophy or abnormal movements. : Deferred  SKIN: Warm and dry   NEURO / PSYCH: Oriented to person, place and time. Speech clear and appropriate.  Follows all commands. Pleasant affect     Telemetry: SR      Intake/Output Summary (Last 24 hours) at 1/2/2023 0730  Last data filed at 1/2/2023 0544  Gross per 24 hour   Intake 180 ml   Output 1070 ml   Net -890 ml   UO 4590 mls ongoing     Weight:   Wt Readings from Last 3 Encounters:   01/02/23 164 lb 1.6 oz (74.4 kg)   05/03/22 163 lb (73.9 kg)   03/21/22 158 lb (71.7 kg)     Current Inpatient Medications:   metoprolol succinate  12.5 mg Oral BID    pantoprazole  40 mg Oral QAM AC    ipratropium  0.5 mg Nebulization 4x daily    budesonide  250 mcg Nebulization BID    Arformoterol Tartrate  15 mcg Nebulization BID    furosemide  40 mg IntraVENous BID    enoxaparin  1 mg/kg SubCUTAneous BID       IV Infusions (if any):      DIAGNOSTIC/ LABORATORY DATA:  Labs:     BMP:   Recent Labs     12/31/22  0713 01/01/23  0543    135   K 3.7  3.7 4.6  4.6   CO2 25 21*   BUN 27* 27*   CREATININE 1.1 1.1   LABGLOM >60 >60   CALCIUM 10.7* 10.6*       TFT:   Lab Results   Component Value Date    TSH 1.940 10/21/2014    V1TFRFE 6.9 10/21/2014      HgA1c:   Lab Results   Component Value Date    LABA1C 4.8 07/02/2019       FASTING LIPID PANEL:  Lab Results   Component Value Date/Time    CHOL 207 04/20/2021 12:00 AM     04/20/2021 12:00 AM    LDLCALC 91 04/20/2021 12:00 AM    TRIG 40 04/20/2021 12:00 AM     Component      Latest Ref Rng & Units 1/1/2023 12/29/2022           5:43 AM 10:05 PM   Pro-BNP      0 - 450 pg/mL 3,280 (H) 6,808 (H)       CXR 12/29/2022:   Impression:     Mild congestive heart failure. Echo 12/31/2022:  Summary   Mildly dilated left ventricle. Ejection fraction is visually estimated at 35-40%. Overall ejection fraction moderately decreased . Anterior and lateral walls are severely hypokinetic. Moderate eccentric hypertrophy. Stage II diastolic dysfunction. The left atrium is mildly dilated. Normal right ventricular size and function. TAPSE 25 mm.    Mild-to-moderate mitral regurgitation with posteriorly directed jet. No hemodynamically significant aortic stenosis is present. MILAGRO by direct   planimetry is 4.4 cm2. Moderate aortic regurgitation is noted. Mild tricuspid regurgitation. RVSP is 46 mmHg. Pulmonary hypertension is mild . No evidence for hemodynamically significant pericardial effusion. Lexiscan stress 12/30/2022: The myocardial perfusion imaging was abnormal with a small sized moderate intensity fixed perfusion defect of the apex. Minimal inducible ischemia noted. Overall left ventricular systolic function was abnormal without regional wall motion abnormalities. Intermediate risk stress test given severe LV dysfunction. ASSESSMENT:    Acute HFrEF  Newly diagnosed   Etiology unclear, ? tachycardia induced cardiomyopathy vs ischemic   ACC/AHA stage C.,  NYHA functional class III,  History of paroxysmal atrial fibrillation/flutter with RVR  Spontaneously converted to normal sinus rhythm. 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  Mildly elevated hs-Melissa: not consistent with NSTEMI  History of prostate cancer  Bipolar disorder  Bronchospasm with possible bronchitis    PLAN:  Cardiac catheterization +/- PCI tomorrow 1/03: indication reviewed with him   Continue Toprol XL: unable to titrate due to borderline blood pressures   Add low dose ACE-I/ARB/ARNI if blood pressure allows and renal function stable post cath   Will also plan to add MRA and SGLT2-I  Change to oral diuretic   Start [facility] once invasive procedures complete     Electronically signed by EMANUEL Barkley on 1/2/2023 at 7:30 AM

## 2023-01-03 LAB
ABO/RH: NORMAL
ANION GAP SERPL CALCULATED.3IONS-SCNC: 12 MMOL/L (ref 7–16)
ANTIBODY SCREEN: NORMAL
BUN BLDV-MCNC: 21 MG/DL (ref 6–23)
CALCIUM SERPL-MCNC: 10.4 MG/DL (ref 8.6–10.2)
CHLORIDE BLD-SCNC: 101 MMOL/L (ref 98–107)
CO2: 26 MMOL/L (ref 22–29)
CREAT SERPL-MCNC: 0.8 MG/DL (ref 0.7–1.2)
GFR SERPL CREATININE-BSD FRML MDRD: >60 ML/MIN/1.73
GLUCOSE BLD-MCNC: 83 MG/DL (ref 74–99)
POTASSIUM SERPL-SCNC: 3.6 MMOL/L (ref 3.5–5)
SODIUM BLD-SCNC: 139 MMOL/L (ref 132–146)

## 2023-01-03 PROCEDURE — 86900 BLOOD TYPING SEROLOGIC ABO: CPT

## 2023-01-03 PROCEDURE — 93458 L HRT ARTERY/VENTRICLE ANGIO: CPT

## 2023-01-03 PROCEDURE — 86850 RBC ANTIBODY SCREEN: CPT

## 2023-01-03 PROCEDURE — 2709999900 HC NON-CHARGEABLE SUPPLY

## 2023-01-03 PROCEDURE — 2500000003 HC RX 250 WO HCPCS

## 2023-01-03 PROCEDURE — 94640 AIRWAY INHALATION TREATMENT: CPT

## 2023-01-03 PROCEDURE — 36415 COLL VENOUS BLD VENIPUNCTURE: CPT

## 2023-01-03 PROCEDURE — 86901 BLOOD TYPING SEROLOGIC RH(D): CPT

## 2023-01-03 PROCEDURE — C1769 GUIDE WIRE: HCPCS

## 2023-01-03 PROCEDURE — 99233 SBSQ HOSP IP/OBS HIGH 50: CPT | Performed by: INTERNAL MEDICINE

## 2023-01-03 PROCEDURE — 6360000002 HC RX W HCPCS: Performed by: INTERNAL MEDICINE

## 2023-01-03 PROCEDURE — 6370000000 HC RX 637 (ALT 250 FOR IP): Performed by: INTERNAL MEDICINE

## 2023-01-03 PROCEDURE — 80048 BASIC METABOLIC PNL TOTAL CA: CPT

## 2023-01-03 PROCEDURE — 4A023N7 MEASUREMENT OF CARDIAC SAMPLING AND PRESSURE, LEFT HEART, PERCUTANEOUS APPROACH: ICD-10-PCS | Performed by: INTERNAL MEDICINE

## 2023-01-03 PROCEDURE — 6360000002 HC RX W HCPCS

## 2023-01-03 PROCEDURE — C1894 INTRO/SHEATH, NON-LASER: HCPCS

## 2023-01-03 PROCEDURE — 93458 L HRT ARTERY/VENTRICLE ANGIO: CPT | Performed by: INTERNAL MEDICINE

## 2023-01-03 PROCEDURE — 2060000000 HC ICU INTERMEDIATE R&B

## 2023-01-03 PROCEDURE — 2700000000 HC OXYGEN THERAPY PER DAY

## 2023-01-03 PROCEDURE — B2111ZZ FLUOROSCOPY OF MULTIPLE CORONARY ARTERIES USING LOW OSMOLAR CONTRAST: ICD-10-PCS | Performed by: INTERNAL MEDICINE

## 2023-01-03 RX ORDER — ASPIRIN 325 MG
325 TABLET ORAL ONCE
Status: COMPLETED | OUTPATIENT
Start: 2023-01-03 | End: 2023-01-03

## 2023-01-03 RX ORDER — ACETAMINOPHEN 325 MG/1
650 TABLET ORAL EVERY 4 HOURS PRN
Status: DISCONTINUED | OUTPATIENT
Start: 2023-01-03 | End: 2023-01-04 | Stop reason: HOSPADM

## 2023-01-03 RX ORDER — FUROSEMIDE 40 MG/1
40 TABLET ORAL DAILY
Status: DISCONTINUED | OUTPATIENT
Start: 2023-01-04 | End: 2023-01-04 | Stop reason: HOSPADM

## 2023-01-03 RX ADMIN — APIXABAN 5 MG: 5 TABLET, FILM COATED ORAL at 13:53

## 2023-01-03 RX ADMIN — ASPIRIN 325 MG ORAL TABLET 325 MG: 325 PILL ORAL at 09:41

## 2023-01-03 RX ADMIN — APIXABAN 5 MG: 5 TABLET, FILM COATED ORAL at 21:51

## 2023-01-03 RX ADMIN — IPRATROPIUM BROMIDE 0.5 MG: 0.5 SOLUTION RESPIRATORY (INHALATION) at 13:40

## 2023-01-03 RX ADMIN — METOPROLOL SUCCINATE 12.5 MG: 25 TABLET, EXTENDED RELEASE ORAL at 21:51

## 2023-01-03 RX ADMIN — ARFORMOTEROL TARTRATE 15 MCG: 15 SOLUTION RESPIRATORY (INHALATION) at 21:26

## 2023-01-03 RX ADMIN — BUDESONIDE 250 MCG: 0.25 SUSPENSION RESPIRATORY (INHALATION) at 21:26

## 2023-01-03 RX ADMIN — METOPROLOL SUCCINATE 12.5 MG: 25 TABLET, EXTENDED RELEASE ORAL at 13:53

## 2023-01-03 RX ADMIN — IPRATROPIUM BROMIDE 0.5 MG: 0.5 SOLUTION RESPIRATORY (INHALATION) at 21:26

## 2023-01-03 RX ADMIN — IPRATROPIUM BROMIDE 0.5 MG: 0.5 SOLUTION RESPIRATORY (INHALATION) at 16:37

## 2023-01-03 ASSESSMENT — PAIN SCALES - GENERAL
PAINLEVEL_OUTOF10: 0
PAINLEVEL_OUTOF10: 0

## 2023-01-03 NOTE — PROGRESS NOTES
Hospital Medicine    Subjective:  pt alert conversive no cp or sob overnite sched for heart cath today      Current Facility-Administered Medications:     [START ON 1/4/2023] furosemide (LASIX) injection 40 mg, 40 mg, IntraVENous, Daily, Bo Braun MD    ondansetron [facility]) injection 4 mg, 4 mg, IntraVENous, Q6H PRN, Poonam Jain MD    metoprolol succinate (TOPROL XL) extended release tablet 12.5 mg, 12.5 mg, Oral, BID, Bo Braun MD, 12.5 mg at 01/02/23 0904    albuterol (PROVENTIL) nebulizer solution 5 mg, 5 mg, Nebulization, 4x Daily PRN, Poonam Jain MD, 5 mg at 12/30/22 1429    clonazePAM (KLONOPIN) tablet 0.25 mg, 0.25 mg, Oral, Q12H PRN, Poonam Jain MD, 0.25 mg at 01/02/23 2041    pantoprazole (PROTONIX) tablet 40 mg, 40 mg, Oral, QAM AC, Poonam Jain MD, 40 mg at 01/01/23 0548    ipratropium (ATROVENT) 0.02 % nebulizer solution 0.5 mg, 0.5 mg, Nebulization, 4x daily, Poonam Jain MD, 0.5 mg at 01/02/23 2024    budesonide (PULMICORT) nebulizer suspension 250 mcg, 250 mcg, Nebulization, BID, Poonam Jain MD, 250 mcg at 01/02/23 2024    Arformoterol Tartrate (BROVANA) nebulizer solution 15 mcg, 15 mcg, Nebulization, BID, Poonam Jain MD, 15 mcg at 01/02/23 2024    perflutren lipid microspheres (DEFINITY) injection 1.5 mL, 1.5 mL, IntraVENous, ONCE PRN, EMANUEL Lowe - CNS    enoxaparin (LOVENOX) injection 70 mg, 1 mg/kg, SubCUTAneous, BID, Bo Braun MD, 70 mg at 01/02/23 0904    Objective:    BP (!) 99/56   Pulse 64   Temp 96.9 °F (36.1 °C) (Temporal)   Resp 18   Ht 5' 7\" (1.702 m)   Wt 163 lb (73.9 kg)   SpO2 99%   BMI 25.53 kg/m²     Heart:  reg  Lungs:  ctab  Abd: + bs soft nontender  Extrem:  w/o edema    CBC with Differential:    Lab Results   Component Value Date/Time    WBC 4.7 12/29/2022 10:05 PM    RBC 4.06 12/29/2022 10:05 PM    HGB 12.9 12/29/2022 10:05 PM    HCT 38.8 12/29/2022 10:05 PM     12/29/2022 10:05 PM    MCV 95.6 12/29/2022 10:05 PM    MCH 31.8 12/29/2022 10:05 PM    MCHC 33.2 12/29/2022 10:05 PM    RDW 13.7 12/29/2022 10:05 PM    LYMPHOPCT 17.5 12/29/2022 10:05 PM    MONOPCT 10.4 12/29/2022 10:05 PM    BASOPCT 0.9 12/29/2022 10:05 PM    MONOSABS 0.49 12/29/2022 10:05 PM    LYMPHSABS 0.82 12/29/2022 10:05 PM    EOSABS 0.19 12/29/2022 10:05 PM    BASOSABS 0.04 12/29/2022 10:05 PM     CMP:    Lab Results   Component Value Date/Time     01/03/2023 05:13 AM    K 3.6 01/03/2023 05:13 AM    K 3.5 01/02/2023 06:13 AM     01/03/2023 05:13 AM    CO2 26 01/03/2023 05:13 AM    BUN 21 01/03/2023 05:13 AM    CREATININE 0.8 01/03/2023 05:13 AM    GFRAA >60 12/15/2021 04:55 AM    LABGLOM >60 01/03/2023 05:13 AM    GLUCOSE 83 01/03/2023 05:13 AM    PROT 6.7 12/29/2022 10:05 PM    LABALBU 4.2 12/29/2022 10:05 PM    CALCIUM 10.4 01/03/2023 05:13 AM    BILITOT 0.4 12/29/2022 10:05 PM    ALKPHOS 98 12/29/2022 10:05 PM    AST 26 12/29/2022 10:05 PM    ALT 41 12/29/2022 10:05 PM     Warfarin PT/INR:  No results found for: INR, PROTIME    Assessment:    Acute syst chf    Plan:   For heart cath today Sherryle Christians, DO  6:50 AM  1/3/2023

## 2023-01-03 NOTE — CARE COORDINATION
Here for sob. Currently on RA. SPO2 97%. Cardiology consult. for heart cath today. Met with patient and wife in room to discuss role and transition of care. They live in 2 story home with bed and bath on second floor. He has been independent PTA - walking everyday. His PCP is Dr Francesca Vail and Dr Abe Armando @ the [place]. He gets his meds thru [facility] or [place]. No history of DME, HHC OR [facility]. Plan is home and family will transport. Electronically signed by Francisca Li RN on 1/3/2023 at 2:17 PM

## 2023-01-03 NOTE — PROCEDURES
Procedure:    Left heart catheterization with coronary angiography    Physician: Meghna Wilkerson DO. Assistant: none    Indication: HF  AUC: 7  AUC indication: HF    Complication: None. Sedation: Intravenous Versed. Anesthesia: Xylocaine, fentanyl     Sedation time: I was present for sedation and ministration at 1014 and I ended sedation at 1028 for a total face-to-face sedation time of 14 min. Estimated blood loss: Minimal    Specimens: none    Contrast used: 35 cc    Hemodynamics:  Opening Aortic pressure: 13/10  LV systolic pressure: 96  LVEDP: 15  No significant gradient across the aortic valve    Angiographic Results/findings:  Left Main: No angiographically significant stenosis. LAD: No angiographically significant stenosis. D1: High takeoff. Almost a ramus. Large vessel. No angiographically significant stenosis. D2: Small vessel. D3: No angiographically significant stenosis. Cx: Distal mild diffuse luminal irregularities. OM1: No angiographically significant stenosis. OM2: Very small vessel. OM 3: Very small vessel. OM 4: Very small vessel. Ramus: Absent. RCA: Dominant. Anterior takeoff. Very large vessel. Proximal diffuse 60% stenosis. PDA: No angiographically significant stenosis. PLB: No angiographically significant stenosis. Procedure:   After obtaining informed consent the patient was taken to the cardiac Cath Lab where the area over the right radial artery was prepped and draped in a sterile fashion. Using ultrasound guidance and a micropuncture technique a 6 [place]an slender rain sheath was placed in the right radial artery. This was aspirated & flushed several times throughout the procedure. This was medicated with verapamil and nitroglycerin. They were given heparin systemically. A 5 [place]an L3 5 catheter was advanced over a wire to the root of the aorta. It was aspirated & flushed with saline. Pressures were obtained.   It was filled with contrast.  This was then manipulated into the left main coronary artery. 4 orthogonal views were obtained. A 5 Thai R4 catheter was then manipulated into the right coronary artery and to orthogonal views were then obtained. A 5 Thai R4 was then advanced & manipulated into the left ventricle. This was then aspirated & flushed with saline & pressures were obtained. An The catheter was then aspirated & flushed with saline once again & pull back pressures were then obtained across the aortic vlave. The right radial artery sheath was removed and a vasc band was then placed with good patent hemostasis. They tolerated the procedure well with no complications. Note: This report was completed using computerized voice recognition software. Every effort has been made to ensure accuracy, however; and invert and computerized transcription errors may be present.

## 2023-01-04 VITALS
BODY MASS INDEX: 25.91 KG/M2 | RESPIRATION RATE: 16 BRPM | SYSTOLIC BLOOD PRESSURE: 121 MMHG | OXYGEN SATURATION: 95 % | DIASTOLIC BLOOD PRESSURE: 64 MMHG | HEIGHT: 67 IN | WEIGHT: 165.1 LBS | TEMPERATURE: 97.2 F | HEART RATE: 94 BPM

## 2023-01-04 LAB
ANION GAP SERPL CALCULATED.3IONS-SCNC: 13 MMOL/L (ref 7–16)
BUN BLDV-MCNC: 23 MG/DL (ref 6–23)
CALCIUM SERPL-MCNC: 10.5 MG/DL (ref 8.6–10.2)
CHLORIDE BLD-SCNC: 105 MMOL/L (ref 98–107)
CO2: 23 MMOL/L (ref 22–29)
CREAT SERPL-MCNC: 0.9 MG/DL (ref 0.7–1.2)
GFR SERPL CREATININE-BSD FRML MDRD: >60 ML/MIN/1.73
GLUCOSE BLD-MCNC: 81 MG/DL (ref 74–99)
POTASSIUM SERPL-SCNC: 3.7 MMOL/L (ref 3.5–5)
PRO-BNP: 1400 PG/ML (ref 0–450)
SODIUM BLD-SCNC: 141 MMOL/L (ref 132–146)

## 2023-01-04 PROCEDURE — 94640 AIRWAY INHALATION TREATMENT: CPT

## 2023-01-04 PROCEDURE — 6370000000 HC RX 637 (ALT 250 FOR IP): Performed by: INTERNAL MEDICINE

## 2023-01-04 PROCEDURE — 6360000002 HC RX W HCPCS: Performed by: INTERNAL MEDICINE

## 2023-01-04 PROCEDURE — 36415 COLL VENOUS BLD VENIPUNCTURE: CPT

## 2023-01-04 PROCEDURE — 99232 SBSQ HOSP IP/OBS MODERATE 35: CPT | Performed by: INTERNAL MEDICINE

## 2023-01-04 PROCEDURE — 80048 BASIC METABOLIC PNL TOTAL CA: CPT

## 2023-01-04 PROCEDURE — 83880 ASSAY OF NATRIURETIC PEPTIDE: CPT

## 2023-01-04 RX ORDER — METOPROLOL SUCCINATE 25 MG/1
12.5 TABLET, EXTENDED RELEASE ORAL 2 TIMES DAILY
Qty: 30 TABLET | Refills: 0 | Status: SHIPPED | OUTPATIENT
Start: 2023-01-04

## 2023-01-04 RX ORDER — FUROSEMIDE 40 MG/1
40 TABLET ORAL DAILY
Qty: 30 TABLET | Refills: 0 | Status: SHIPPED | OUTPATIENT
Start: 2023-01-04

## 2023-01-04 RX ADMIN — SACUBITRIL AND VALSARTAN 0.5 TABLET: 24; 26 TABLET, FILM COATED ORAL at 11:21

## 2023-01-04 RX ADMIN — ARFORMOTEROL TARTRATE 15 MCG: 15 SOLUTION RESPIRATORY (INHALATION) at 10:32

## 2023-01-04 RX ADMIN — APIXABAN 5 MG: 5 TABLET, FILM COATED ORAL at 09:49

## 2023-01-04 RX ADMIN — BUDESONIDE 250 MCG: 0.25 SUSPENSION RESPIRATORY (INHALATION) at 10:32

## 2023-01-04 RX ADMIN — METOPROLOL SUCCINATE 12.5 MG: 25 TABLET, EXTENDED RELEASE ORAL at 09:49

## 2023-01-04 RX ADMIN — PANTOPRAZOLE SODIUM 40 MG: 40 TABLET, DELAYED RELEASE ORAL at 05:28

## 2023-01-04 RX ADMIN — IPRATROPIUM BROMIDE 0.5 MG: 0.5 SOLUTION RESPIRATORY (INHALATION) at 10:32

## 2023-01-04 RX ADMIN — FUROSEMIDE 40 MG: 40 TABLET ORAL at 09:49

## 2023-01-04 NOTE — CARE COORDINATION
Discharge order noted. POD #1 heart cath. Plan is home and family will transport. Eliquis free 30 trial card given to wife.  Electronically signed by Patricia Gibson RN on 1/4/2023 at 9:58 AM

## 2023-01-04 NOTE — PROGRESS NOTES
INPATIENT CARDIOLOGY FOLLOW-UP    Name: Shana Zavala    Age: 68 y.o. Date of Admission: 12/29/2022  9:48 PM    Date of Service: 1/3/2023    Chief Complaint: Follow-up for CHF      Interim History:  No new overnight cardiac complaints. Today, he is feeling much better, he thinks his nebulizer treatments are helping his dyspnea. Today he is off oxygen and ambulating. Currently with no complaints of CP, palpitations, dizziness, or lightheadedness. Remains in SR on telemetry.     Review of Systems:   Cardiac: As per HPI  General: No fever, chills  Pulmonary: As per HPI  HEENT: No visual disturbances, difficult swallowing  GI: No nausea, vomiting  Endocrine: No thyroid disease or DM  Musculoskeletal: GONZALES x 4, no focal motor deficits  Skin: Intact, no rashes  Neuro/Psych: No headache or seizures    Problem List:  Patient Active Problem List   Diagnosis    Depression with anxiety    Moderate asthma without complication    Prostate cancer ([facility] [address]    Asthma exacerbation    Acute heart failure, unspecified heart failure type (HCC)       Allergies:  No Known Allergies    Current Medications:  Current Facility-Administered Medications   Medication Dose Route Frequency Provider Last Rate Last Admin    acetaminophen (TYLENOL) tablet 650 mg  650 mg Oral Q4H PRN Jt Dyson DO        apixaban Everlene Saud) tablet 5 mg  5 mg Oral BID Jt Dyson DO   5 mg at 01/03/23 1353    [START ON 1/4/2023] furosemide (LASIX) injection 40 mg  40 mg IntraVENous Daily Prasanth Sheth MD        ondansetron [facility]) injection 4 mg  4 mg IntraVENous Q6H PRN Madiha Rodriguez MD        metoprolol succinate (TOPROL XL) extended release tablet 12.5 mg  12.5 mg Oral BID Prasanth Sheth MD   12.5 mg at 01/03/23 1353    albuterol (PROVENTIL) nebulizer solution 5 mg  5 mg Nebulization 4x Daily PRN Madiha Rodriguez MD   5 mg at 12/30/22 1429    clonazePAM (KLONOPIN) tablet 0.25 mg  0.25 mg Oral Q12H PRN Luis Felipe GARZA Michael Greene MD   0.25 mg at 01/02/23 2041    pantoprazole (PROTONIX) tablet 40 mg  40 mg Oral QAM AC Orlando Suárez MD   40 mg at 01/01/23 0548    ipratropium (ATROVENT) 0.02 % nebulizer solution 0.5 mg  0.5 mg Nebulization 4x daily Orlando Suárez MD   0.5 mg at 01/03/23 1637    budesonide (PULMICORT) nebulizer suspension 250 mcg  250 mcg Nebulization BID Orlando Suárez MD   250 mcg at 01/02/23 2024    Arformoterol Tartrate (BROVANA) nebulizer solution 15 mcg  15 mcg Nebulization BID Orlando Suárez MD   15 mcg at 01/02/23 2024    perflutren lipid microspheres (DEFINITY) injection 1.5 mL  1.5 mL IntraVENous ONCE PRN EMANUEL Cooper - CNS             Physical Exam:  BP (!) 102/54   Pulse 63   Temp 97 °F (36.1 °C) (Temporal)   Resp 16   Ht 5' 7\" (1.702 m)   Wt 163 lb (73.9 kg)   SpO2 96%   BMI 25.53 kg/m²   Wt Readings from Last 3 Encounters:   01/03/23 163 lb (73.9 kg)   05/03/22 163 lb (73.9 kg)   03/21/22 158 lb (71.7 kg)     Appearance: Awake, alert, no acute respiratory distress  Skin: Intact, no rash  Head: Normocephalic, atraumatic  Eyes: EOMI, no conjunctival erythema  ENMT: No pharyngeal erythema, MMM, no rhinorrhea  Neck: Supple, no elevated JVP, no carotid bruits  Lungs: Bilateral air entry is diminished with scattered wheezing  Cardiac: Regular rate and rhythm, +S1S2, no murmurs apparent  Abdomen: Soft, nontender, +bowel sounds  Extremities: Moves all extremities x 4, no lower extremity edema  Neurologic: No focal motor deficits apparent, normal mood and affect  Peripheral Pulses: Intact posterior tibial pulses bilaterally    Intake/Output:    Intake/Output Summary (Last 24 hours) at 1/3/2023 2102  Last data filed at 1/3/2023 1400  Gross per 24 hour   Intake 240 ml   Output 250 ml   Net -10 ml     No intake/output data recorded.     Laboratory Tests:  Recent Labs     01/01/23  0543 01/02/23  0613 01/03/23  0513    142 139   K 4.6  4.6 3.5  3.5 3.6    102 101   CO2 21* 29 26   BUN 27* 25* 21   CREATININE 1.1 1.0 0.8   GLUCOSE 87 81 83   CALCIUM 10.6* 10.3* 10.4*     Lab Results   Component Value Date/Time    MG 2.1 01/02/2023 06:13 AM     No results for input(s): ALKPHOS, ALT, AST, PROT, BILITOT, BILIDIR, LABALBU in the last 72 hours. No results for input(s): WBC, RBC, HGB, HCT, MCV, MCH, MCHC, RDW, PLT, MPV in the last 72 hours. No results found for: CKTOTAL, CKMB, CKMBINDEX, TROPONINI  No results found for: INR, PROTIME  Lab Results   Component Value Date    TSH 1.940 10/21/2014     Lab Results   Component Value Date    LABA1C 4.8 07/02/2019     No results found for: EAG  Lab Results   Component Value Date    CHOL 207 (H) 04/20/2021    CHOL 147 07/02/2019    CHOL 170 03/10/2017     Lab Results   Component Value Date    TRIG 40 04/20/2021    TRIG 58 07/02/2019    TRIG 68 03/10/2017     Lab Results   Component Value Date     (A) 04/20/2021    HDL 60 07/02/2019    HDL 57 03/10/2017     Lab Results   Component Value Date    LDLCALC 91 04/20/2021    LDLCALC 76 07/02/2019    LDLCALC 99 03/10/2017     No results found for: LABVLDL, VLDL  No results found for: CHOLHDLRATIO    Cardiac Tests:  ECG: Normal sinus rhythm, right bundle branch block, left anterior fascicular block, bivascular block, LVH, abnormal EKG. Telemetry findings reviewed: Normal sinus rhythm heart rate in the 80s, no further episodes of A. fib. Labs and vitals were reviewed: As above blood pressure 108/63, heart rate 70 sats 96 % on 4 L    Labs-BUNs/creatinine 27/1.1>>27/1.1>> 25/1 K3.7>>4.6, mag 2.1, proBNP 6808>>3280, troponin, high sensitive 25 and 24 CBC was normal.      Chest X-ray: Mild congestive heart failure      Echocardiogram- 12/31/22:    Mildly dilated left ventricle. Ejection fraction is visually estimated at 35-40%. Overall ejection fraction moderately decreased . Anterior and lateral walls are severely hypokinetic. Moderate eccentric hypertrophy.    Stage II diastolic dysfunction. The left atrium is mildly dilated. Normal right ventricular size and function. TAPSE 25 mm. Mild-to-moderate mitral regurgitation with posteriorly directed jet. No hemodynamically significant aortic stenosis is present. MILAGRO by direct   planimetry is 4.4 cm2. Moderate aortic regurgitation is noted. Mild tricuspid regurgitation. RVSP is 46 mmHg. Pulmonary hypertension is mild . No evidence for hemodynamically significant pericardial effusion. Lexiscan nuclear stress test-12/30/2022: The myocardial perfusion imaging was abnormal with a small sized   moderate intensity fixed perfusion defect of the apex. Minimal   inducible ischemia noted. Overall left ventricular systolic function was abnormal without   regional wall motion abnormalities. Intermediate risk stress test given severe LV dysfunction, EF 26%         Cardiac catheterization:   Hemodynamics:  Opening Aortic pressure: 96/00  LV systolic pressure: 96  LVEDP: 15  No significant gradient across the aortic valve     Angiographic Results/findings:  Left Main: No angiographically significant stenosis. LAD: No angiographically significant stenosis. D1: High takeoff. Almost a ramus. Large vessel. No angiographically significant stenosis. D2: Small vessel. D3: No angiographically significant stenosis. Cx: Distal mild diffuse luminal irregularities. OM1: No angiographically significant stenosis. OM2: Very small vessel. OM 3: Very small vessel. OM 4: Very small vessel. Ramus: Absent. RCA: Dominant. Anterior takeoff. Very large vessel. Proximal diffuse 60% stenosis. PDA: No angiographically significant stenosis. PLB: No angiographically significant stenosis. ASSESSMENT:  Acute HFrEF, etiology tachycardia induced cardiomyopathy, UOP not accurate, UOP 2.2 L, appears nearly euvolemic  ACC/AHA stage C.,  NYHA functional class III>>II,  New LV dysfunction.  EF 35-40%, no obstructive CAD based on cath  History of paroxysmal atrial fibrillation with RVR, spontaneously converted to normal sinus rhythm. Chronic bifascicular block including right bundle branch block and left anterior fascicular block  Atypical chest pain resolved  History of prostate cancer  Bipolar disorder  Bronchospasm with possible bronchitis    Plan:   Changes Lasix to 40 mg p.o. daily  Strict input output charting  Continue cardiac diet and restrict daily salt intake to less than 2 g  Continue GDMT for HFrEF continue low-dose Toprol-XL 12.5 mg p.o. twice daily. If he remains hemodynamically stable then will consider adding low-dose Entresto in a.m. We will consider adding Aldactone and SGLT 2 inhibitor therapy as an outpatient. Continue bronchodilators for bronchospasm as per primary service  Restart on Eliquis 5 mg p.o. twice daily  Cath results reviewed, as above and discussed with the patient and his wife      More  than 35 minutes  was spent counseling the patient, reviewing the medications, results, assessment and the rationale for the above recommendations. Darcie Morales MD., Reva Orozco.   [facility]) Cardiology

## 2023-01-04 NOTE — PROGRESS NOTES
INPATIENT CARDIOLOGY FOLLOW-UP    Name: Romulo Vital    Age: 68 y.o. Date of Admission: 12/29/2022  9:48 PM    Date of Service: 1/4/2023    Chief Complaint: Follow-up for CHF      Interim History:  No new overnight cardiac complaints. Today, he is feeling much better, he thinks he is ready to go home. Today he is off oxygen and ambulating. Currently with no complaints of CP, palpitations, dizziness, or lightheadedness. Remains in SR on telemetry.     Review of Systems:   Cardiac: As per HPI  General: No fever, chills  Pulmonary: As per HPI  HEENT: No visual disturbances, difficult swallowing  GI: No nausea, vomiting  Endocrine: No thyroid disease or DM  Musculoskeletal: GONZALES x 4, no focal motor deficits  Skin: Intact, no rashes  Neuro/Psych: No headache or seizures    Problem List:  Patient Active Problem List   Diagnosis    Depression with anxiety    Moderate asthma without complication    Prostate cancer ([facility] [address]    Asthma exacerbation    Acute heart failure, unspecified heart failure type (HCC)       Allergies:  No Known Allergies    Current Medications:  Current Facility-Administered Medications   Medication Dose Route Frequency Provider Last Rate Last Admin    sacubitril-valsartan (ENTRESTO) 24-26 MG per tablet 0.5 tablet  0.5 tablet Oral BID Anton Jenkins,         acetaminophen (TYLENOL) tablet 650 mg  650 mg Oral Q4H PRN Alejandra Salt, DO        apixaban Stevie Nim) tablet 5 mg  5 mg Oral BID Alejandra Salt, DO   5 mg at 01/03/23 2151    furosemide (LASIX) tablet 40 mg  40 mg Oral Daily Giovanny Moreno MD        ondansetron [facility]) injection 4 mg  4 mg IntraVENous Q6H PRN Eleln Gooden MD        metoprolol succinate (TOPROL XL) extended release tablet 12.5 mg  12.5 mg Oral BID Giovanny Moreno MD   12.5 mg at 01/03/23 2151    albuterol (PROVENTIL) nebulizer solution 5 mg  5 mg Nebulization 4x Daily PRN Ellen Gooden MD   5 mg at 12/30/22 1429    clonazePAM (Dmear Pean) tablet 0.25 mg  0.25 mg Oral Q12H PRN May Cordova MD   0.25 mg at 01/02/23 2041    pantoprazole (PROTONIX) tablet 40 mg  40 mg Oral QAM AC May Cordova MD   40 mg at 01/04/23 0528    ipratropium (ATROVENT) 0.02 % nebulizer solution 0.5 mg  0.5 mg Nebulization 4x daily May Cordova MD   0.5 mg at 01/03/23 2126    budesonide (PULMICORT) nebulizer suspension 250 mcg  250 mcg Nebulization BID May Cordova MD   250 mcg at 01/03/23 2126    Arformoterol Tartrate (BROVANA) nebulizer solution 15 mcg  15 mcg Nebulization BID May Cordova MD   15 mcg at 01/03/23 2126    perflutren lipid microspheres (DEFINITY) injection 1.5 mL  1.5 mL IntraVENous ONCE PRN EMANUEL Delaney - CNS             Physical Exam:  /64   Pulse 94   Temp 97.2 °F (36.2 °C) (Temporal)   Resp 16   Ht 5' 7\" (1.702 m)   Wt 165 lb 1.6 oz (74.9 kg)   SpO2 95%   BMI 25.86 kg/m²   Wt Readings from Last 3 Encounters:   01/04/23 165 lb 1.6 oz (74.9 kg)   05/03/22 163 lb (73.9 kg)   03/21/22 158 lb (71.7 kg)     Appearance: Awake, alert, no acute respiratory distress  Skin: Intact, no rash  Head: Normocephalic, atraumatic  Eyes: EOMI, no conjunctival erythema  ENMT: No pharyngeal erythema, MMM, no rhinorrhea  Neck: Supple, no elevated JVP, no carotid bruits  Lungs: Bilateral air entry is diminished with scattered wheezing  Cardiac: Regular rate and rhythm, +S1S2, no murmurs apparent  Abdomen: Soft, nontender, +bowel sounds  Extremities: Moves all extremities x 4, no lower extremity edema  Neurologic: No focal motor deficits apparent, normal mood and affect  Peripheral Pulses: Intact posterior tibial pulses bilaterally    Intake/Output:    Intake/Output Summary (Last 24 hours) at 1/4/2023 0826  Last data filed at 1/4/2023 0615  Gross per 24 hour   Intake 240 ml   Output 700 ml   Net -460 ml     No intake/output data recorded.     Laboratory Tests:  Recent Labs     01/02/23  1012 01/03/23  9168 01/04/23  0655    139 141   K 3.5  3.5 3.6 3.7    101 105   CO2 29 26 23   BUN 25* 21 23   CREATININE 1.0 0.8 0.9   GLUCOSE 81 83 81   CALCIUM 10.3* 10.4* 10.5*     Lab Results   Component Value Date/Time    MG 2.1 01/02/2023 06:13 AM     No results for input(s): ALKPHOS, ALT, AST, PROT, BILITOT, BILIDIR, LABALBU in the last 72 hours. No results for input(s): WBC, RBC, HGB, HCT, MCV, MCH, MCHC, RDW, PLT, MPV in the last 72 hours. No results found for: CKTOTAL, CKMB, CKMBINDEX, TROPONINI  No results found for: INR, PROTIME  Lab Results   Component Value Date    TSH 1.940 10/21/2014     Lab Results   Component Value Date    LABA1C 4.8 07/02/2019     No results found for: EAG  Lab Results   Component Value Date    CHOL 207 (H) 04/20/2021    CHOL 147 07/02/2019    CHOL 170 03/10/2017     Lab Results   Component Value Date    TRIG 40 04/20/2021    TRIG 58 07/02/2019    TRIG 68 03/10/2017     Lab Results   Component Value Date     (A) 04/20/2021    HDL 60 07/02/2019    HDL 57 03/10/2017     Lab Results   Component Value Date    LDLCALC 91 04/20/2021    LDLCALC 76 07/02/2019    LDLCALC 99 03/10/2017     No results found for: LABVLDL, VLDL  No results found for: CHOLHDLRATIO    Cardiac Tests:  ECG: Normal sinus rhythm, right bundle branch block, left anterior fascicular block, bivascular block, LVH, abnormal EKG. Telemetry findings reviewed: Normal sinus rhythm heart rate in the 60s with occasional PVCs, no further episodes of A. fib. Labs and vitals were reviewed: As above blood pressure 121/64, heart rate 94 sats 95 % on RA    Labs-BUNs/creatinine 27/1.1>>27/1.1>> 25/1>>23/0.9,  K3.7>>4.6>>3.7, mag 2.1, proBNP 6808>>3280>>1400, troponin, high sensitive 25 and 24 CBC was normal.      Chest X-ray: Mild congestive heart failure      Echocardiogram- 12/31/22:    Mildly dilated left ventricle. Ejection fraction is visually estimated at 35-40%.    Overall ejection fraction moderately decreased .   Anterior and lateral walls are severely hypokinetic. Moderate eccentric hypertrophy. Stage II diastolic dysfunction. The left atrium is mildly dilated. Normal right ventricular size and function. TAPSE 25 mm. Mild-to-moderate mitral regurgitation with posteriorly directed jet. No hemodynamically significant aortic stenosis is present. MILAGRO by direct   planimetry is 4.4 cm2. Moderate aortic regurgitation is noted. Mild tricuspid regurgitation. RVSP is 46 mmHg. Pulmonary hypertension is mild . No evidence for hemodynamically significant pericardial effusion. Lexiscan nuclear stress test-12/30/2022: The myocardial perfusion imaging was abnormal with a small sized   moderate intensity fixed perfusion defect of the apex. Minimal   inducible ischemia noted. Overall left ventricular systolic function was abnormal without   regional wall motion abnormalities. Intermediate risk stress test given severe LV dysfunction, EF 26%         Cardiac catheterization:   Hemodynamics:  Opening Aortic pressure: 57/16  LV systolic pressure: 96  LVEDP: 15  No significant gradient across the aortic valve     Angiographic Results/findings:  Left Main: No angiographically significant stenosis. LAD: No angiographically significant stenosis. D1: High takeoff. Almost a ramus. Large vessel. No angiographically significant stenosis. D2: Small vessel. D3: No angiographically significant stenosis. Cx: Distal mild diffuse luminal irregularities. OM1: No angiographically significant stenosis. OM2: Very small vessel. OM 3: Very small vessel. OM 4: Very small vessel. Ramus: Absent. RCA: Dominant. Anterior takeoff. Very large vessel. Proximal diffuse 60% stenosis. PDA: No angiographically significant stenosis. PLB: No angiographically significant stenosis.       ASSESSMENT:  Acute HFrEF, etiology tachycardia induced cardiomyopathy, UOP not accurate, UOP 2.2 L, appears nearly euvolemic, LVEDP 15 ACC/AHA stage C.,  NYHA functional class III>>II,  New LV dysfunction. EF 35-40%, no obstructive CAD based on cath  History of paroxysmal atrial fibrillation with RVR, spontaneously converted to normal sinus rhythm and remains in SR. Chronic bifascicular block including right bundle branch block and left anterior fascicular block  Atypical chest pain resolved  History of prostate cancer  Bipolar disorder  Bronchospasm with possible bronchitis    Plan:   Continue Lasix to 40 mg p.o. daily  Strict input output charting  Continue cardiac diet and restrict daily salt intake to less than 2 g  Continue GDMT for HFrEF continue low-dose Toprol-XL 12.5 mg p.o. twice daily. If he remains hemodynamically stable , start low-dose Entresto 24/26 mg 1/2 tab twice a day. Check BMP in one week. We will consider adding Aldactone and SGLT 2 inhibitor therapy as an outpatient. Heart failure coordinator consult. Continue bronchodilators for bronchospasm as per primary service  Restart on Eliquis 5 mg p.o. twice daily  Cath results reviewed, as above and discussed with the patient and his wife  Follow-up in the heart failure clinic in 1 week with Elise Martínez. Follow-up with Dr. Jorge Monteiro in 2 weeks. D/w Dr. Nikolay Harkins from the primary service, stable for discharge from the cardiology service. Will sign off. Jaky Almanza MD., Viral Wilson.   [phone]  Cardiology

## 2023-01-05 ENCOUNTER — TELEPHONE (OUTPATIENT)
Dept: PRIMARY CARE CLINIC | Age: 78
End: 2023-01-05

## 2023-01-05 ENCOUNTER — CARE COORDINATION (OUTPATIENT)
Dept: CASE MANAGEMENT | Age: 78
End: 2023-01-05

## 2023-01-05 NOTE — TELEPHONE ENCOUNTER
Spoke with pt wife to schedule hosp follow up wife states nothing needed at this time he has a follow up with psych and his follow up here is feb 1st she states that is good enough and if they need anything sooner they will call.

## 2023-01-05 NOTE — CARE COORDINATION
[facility] Care Transitions Initial Follow Up Call    Call within 2 business days of discharge: Yes        Patient: Ez Alcaraz Patient : 1945   MRN: [id number]  Reason for Admission: Acute Heart Failure  Discharge Date: 23 RARS: Readmission Risk Score: 10.1      Last Discharge [address]       Date Complaint Diagnosis Description Type Department Provider    22 Shortness of Breath Acute heart failure, unspecified heart failure type ([facility] [address] . .. ED to Hosp-Admission (Discharged) (ADMITTED)  8SE [address] [facility]; [place]. .. Attempted to reach patient by phone for initial post hospital discharge follow up. CTN spoke with patient's spouse, Giulia. CTN identified self and explained role/reason for call; HIPAA compliant  Giulia reports patient is resting at time of this call. Discussed will attempt outreach at a later time.           Annmarie Monroe RN

## 2023-01-06 ENCOUNTER — TELEPHONE (OUTPATIENT)
Dept: ADMINISTRATIVE | Age: 78
End: 2023-01-06

## 2023-01-06 ENCOUNTER — CARE COORDINATION (OUTPATIENT)
Dept: CASE MANAGEMENT | Age: 78
End: 2023-01-06

## 2023-01-06 NOTE — PROGRESS NOTES
Physician Progress Note      PATIENT:               Edgar Marcelo  CSN #:                  [id number]  :                       1945  ADMIT DATE:       2022 9:48 PM  [facility] [place], operated by [facility] DATE:        2023 11:38 AM  RESPONDING  PROVIDER #: [id number] Brayan Rodriguez          QUERY TEXT:    Noted documentation of Moderate persistent asthma with exacerbation by ED   physician. If possible, please document in progress notes and discharge   summary:    The medical record reflects the following:  Risk Factors: asthma  Clinical Indicators: ED notes \"A diagnosis of Moderate persistent asthma with   exacerbation was also pertinent to this visit\", \"Impression: Acute heart   failure, Moderate persistent asthma with exacerbation\". Treatment: Nebulizers, Duonebs, IV solumedrol x 1    Thank you,  Nathaniel Roque RN, BSN, CCDS, Clinical Documentation Improvement  Options provided:  -- Moderate persistent asthma with exacerbation ruled in  -- Moderate persistent asthma with exacerbation resolved  -- Asthma exacerbation ruled out  -- Other - I will add my own diagnosis  -- Disagree - Not applicable / Not valid  -- Disagree - Clinically unable to determine / Unknown  -- Refer to Clinical Documentation Reviewer    PROVIDER RESPONSE TEXT:    Asthma exacerbation ruled out.     Query created by: Sultana Elkins on 2023 10:39 AM      Electronically signed by:  Juan Rodriguez 2023 3:03 PM

## 2023-01-06 NOTE — TELEPHONE ENCOUNTER
Pt's wife Karen Morgan called to schedule hospital follow up with Dr Yoli Lyman, [address] [place] [address] 12/30/22; heart cath done; please call her at [phone]

## 2023-01-18 ENCOUNTER — OFFICE VISIT (OUTPATIENT)
Dept: CARDIOLOGY CLINIC | Age: 78
End: 2023-01-18
Payer: MEDICARE

## 2023-01-18 VITALS
RESPIRATION RATE: 20 BRPM | WEIGHT: 175.5 LBS | OXYGEN SATURATION: 97 % | SYSTOLIC BLOOD PRESSURE: 104 MMHG | DIASTOLIC BLOOD PRESSURE: 60 MMHG | HEART RATE: 66 BPM | HEIGHT: 65 IN | BODY MASS INDEX: 29.24 KG/M2

## 2023-01-18 DIAGNOSIS — I50.20 HFREF (HEART FAILURE WITH REDUCED EJECTION FRACTION) (HCC): Primary | ICD-10-CM

## 2023-01-18 PROCEDURE — 93000 ELECTROCARDIOGRAM COMPLETE: CPT | Performed by: INTERNAL MEDICINE

## 2023-01-18 PROCEDURE — 99214 OFFICE O/P EST MOD 30 MIN: CPT | Performed by: INTERNAL MEDICINE

## 2023-01-18 PROCEDURE — 1123F ACP DISCUSS/DSCN MKR DOCD: CPT | Performed by: INTERNAL MEDICINE

## 2023-01-18 RX ORDER — SPIRONOLACTONE 25 MG/1
12.5 TABLET ORAL DAILY
Qty: 45 TABLET | Refills: 3 | Status: SHIPPED | OUTPATIENT
Start: 2023-01-18

## 2023-01-18 RX ORDER — METOPROLOL SUCCINATE 25 MG/1
25 TABLET, EXTENDED RELEASE ORAL DAILY
Qty: 90 TABLET | Refills: 3 | Status: SHIPPED | OUTPATIENT
Start: 2023-01-18

## 2023-01-18 RX ORDER — FUROSEMIDE 40 MG/1
40 TABLET ORAL DAILY
Qty: 90 TABLET | Refills: 3 | Status: SHIPPED | OUTPATIENT
Start: 2023-01-18

## 2023-01-19 NOTE — PROGRESS NOTES
Perry Wilder  [birth year]  Date of Service: 1/18/2023    Patient Active Problem List    Diagnosis Date Noted    Acute heart failure, unspecified heart failure type ([facility] 12/30/2022     Priority: Medium    Asthma exacerbation 12/15/2021    Prostate cancer ([facility]     Moderate asthma without complication 94/08/5480    Depression with anxiety 10/22/2014       Social History     Socioeconomic History    Marital status:      Spouse name: None    Number of children: None    Years of education: None    Highest education level: None   Tobacco Use    Smoking status: Never    Smokeless tobacco: Never   Vaping Use    Vaping Use: Never used   Substance and Sexual Activity    Alcohol use:  Yes     Alcohol/week: 14.0 standard drinks     Types: 14 Glasses of wine per week    Drug use: Never   Social History Narrative    Drinks 1 cup of coffee daily     Social Determinants of Health     Financial Resource Strain: Low Risk     Difficulty of Paying Living Expenses: Not hard at all   Food Insecurity: No Food Insecurity    Worried About Running Out of Food in the Last Year: Never true    Ran Out of Food in the Last Year: Never true       Current Outpatient Medications   Medication Sig Dispense Refill    spironolactone (ALDACTONE) 25 MG tablet Take 0.5 tablets by mouth daily 45 tablet 3    metoprolol succinate (TOPROL XL) 25 MG extended release tablet Take 1 tablet by mouth daily 90 tablet 3    apixaban (ELIQUIS) 5 MG TABS tablet Take 1 tablet by mouth 2 times daily 180 tablet 3    sacubitril-valsartan (ENTRESTO) 24-26 MG per tablet Take 0.5 tablets by mouth 2 times daily 180 tablet 3    furosemide (LASIX) 40 MG tablet Take 1 tablet by mouth daily 90 tablet 3    lamoTRIgine (LAMICTAL) 200 MG tablet Take 200 mg by mouth daily      clonazePAM (KLONOPIN) 0.5 MG tablet TAKE 1 TABLET BY MOUTH THREE TIMES DAILY AS NEEDED FOR ANXIETY 270 tablet 0    acyclovir (ZOVIRAX) 200 MG capsule Take 400 mg by mouth 2 times daily albuterol (PROVENTIL) (5 MG/ML) 0.5% nebulizer solution Take 1 mL by nebulization 4 times daily as needed for Wheezing 120 each 3    fluticasone (FLONASE) 50 MCG/ACT nasal spray 2 sprays by Nasal route daily 1 Bottle 3    SYMBICORT 160-4.5 MCG/ACT AERO Inhale 2 puffs into the lungs 2 times daily       No current facility-administered medications for this visit. No Known Allergies    Chief Complaint:  Alwin Olszewski is here today for follow up and management/recomendations for paroxysmal atrial fibrillation, nonischemic cardiomyopathy, chronic right bundle branch block. History of Present Illness: Alwin Olszewski is being seen today for initial hospital follow-up. He has a history of asthma and was having dyspnea on exertion somewhat chronically. However, he presented on 12/30/2022 with severe shortness of breath states and pulmonary edema. He was found to be in atrial fibrillation with RVR. There was a prior ECG noted from 1221 that showed atrial fibrillation with RVR at that time. Therefore I suspect that he had been having episodes of atrial fibrillation and possibly developed a tachycardia induced cardiomyopathy. The echocardiogram did show an ejection fraction of 35 to 40% and moderate aortic regurgitation. A cardiac catheterization demonstrated only a 60% RCA stenosis with no ischemic lesions. He converted to sinus rhythm and was placed on guideline directed medical therapy and diuresed. He now states that he still has some shortness of breath that is primarily only when he overeats. He denies any chest discomfort, orthopnea/PND, or lower extremity edema. He denies any palpitations or presyncopal symptoms. REVIEW OF SYSTEMS:  As above. Patient does not complain of any fever, chills, nausea, vomiting or diarrhea. No focal, motor or neurological deficits. No changes in his/her vision, hearing, bowel or bladder habits. He is not known to have a history of thyroid problems. No recent nose bleeds. PHYSICAL EXAM:  Vitals:    01/18/23 0919   BP: 104/60   Site: Right Upper Arm   Position: Sitting   Cuff Size: Medium Adult   Pulse: 66   Resp: 20   SpO2: 97%   Weight: 175 lb 8 oz (79.6 kg)   Height: 5' 5\" (1.651 m)       GENERAL:  He is alert and oriented x 3, communicates well, in no distress. NECK:  No masses, trachea is mid position. Supple, full ROM, no JVD or bruits. No palpable thyromegaly or lymphadenopathy. HEART:  Regular rate and rhythm. Normal S1 and S2. There are no abnormal murmurs or gallops. LUNGS:  Clear to auscultation bilaterally. No use of accessory muscles. symmetrical excursion. ABDOMEN:  Soft, non-tender. Normal bowel sounds. EXTREMITIES:  Full ROM x 4. Trace bilateral lower extremity edema. Good distal pulses. EYES:  Extraocular muscles intact. PERRL. Normal lids & conjunctiva. ENT:  Nares are clear & not bleeding. Moist mucosa. Normal lips formation. No external masses   NEURO: no tremors, full ROM x 4, EOMI. SKIN:  Warm, dry and intact. Normal turgor. EKG: Sinus rhythm, 66 bpm, nl axis, nonspecific ST - T wave changes. Bundle branch block. Assessment:   Nonischemic cardiomyopathy as outlined above. Clinically euvolemic and no decompensation at this time. Paroxysmal atrial fibrillation. maintaining sinus rhythm at this time. Moderate aortic regurgitation  Mild to moderate mitral regurgitation  Asthma  Prostate cancer  Bipolar      Recommendations: Add Aldactone  Serial BMPs  Limited echo in March to follow for improvement in ejection fraction. Thank you for allowing me to participate in your patient's care. [address], [address] [facility]  Interventional Cardiology    Note: This report was completed using computerized voice recognition software. Every effort has been made to ensure accuracy, however; and invert and computerized transcription errors may be present.

## 2023-01-20 ENCOUNTER — HOSPITAL ENCOUNTER (OUTPATIENT)
Age: 78
Discharge: HOME | End: 2023-01-20
Payer: MEDICARE

## 2023-01-20 DIAGNOSIS — I50.20 HFREF (HEART FAILURE WITH REDUCED EJECTION FRACTION) (HCC): ICD-10-CM

## 2023-01-20 LAB
ANION GAP SERPL CALCULATED.3IONS-SCNC: 13 MMOL/L (ref 7–16)
BUN BLDV-MCNC: 22 MG/DL (ref 6–23)
CALCIUM SERPL-MCNC: 10.3 MG/DL (ref 8.6–10.2)
CHLORIDE BLD-SCNC: 103 MMOL/L (ref 98–107)
CO2: 24 MMOL/L (ref 22–29)
CREAT SERPL-MCNC: 1 MG/DL (ref 0.7–1.2)
GFR SERPL CREATININE-BSD FRML MDRD: >60 ML/MIN/1.73
GLUCOSE BLD-MCNC: 104 MG/DL (ref 74–99)
POTASSIUM SERPL-SCNC: 4.4 MMOL/L (ref 3.5–5)
SODIUM BLD-SCNC: 140 MMOL/L (ref 132–146)

## 2023-01-20 PROCEDURE — 80048 BASIC METABOLIC PNL TOTAL CA: CPT

## 2023-01-20 PROCEDURE — 36415 COLL VENOUS BLD VENIPUNCTURE: CPT

## 2023-01-23 ENCOUNTER — HOSPITAL ENCOUNTER (OUTPATIENT)
Age: 78
Discharge: HOME | End: 2023-01-23
Payer: MEDICARE

## 2023-01-23 DIAGNOSIS — I50.20 HFREF (HEART FAILURE WITH REDUCED EJECTION FRACTION) (HCC): ICD-10-CM

## 2023-01-23 LAB
ANION GAP SERPL CALCULATED.3IONS-SCNC: 10 MMOL/L (ref 7–16)
BUN BLDV-MCNC: 22 MG/DL (ref 6–23)
CALCIUM SERPL-MCNC: 10.6 MG/DL (ref 8.6–10.2)
CHLORIDE BLD-SCNC: 102 MMOL/L (ref 98–107)
CO2: 24 MMOL/L (ref 22–29)
CREAT SERPL-MCNC: 1 MG/DL (ref 0.7–1.2)
GFR SERPL CREATININE-BSD FRML MDRD: >60 ML/MIN/1.73
GLUCOSE BLD-MCNC: 111 MG/DL (ref 74–99)
POTASSIUM SERPL-SCNC: 4.3 MMOL/L (ref 3.5–5)
SODIUM BLD-SCNC: 136 MMOL/L (ref 132–146)

## 2023-01-23 PROCEDURE — 36415 COLL VENOUS BLD VENIPUNCTURE: CPT

## 2023-01-23 PROCEDURE — 80048 BASIC METABOLIC PNL TOTAL CA: CPT

## 2023-01-31 ENCOUNTER — HOSPITAL ENCOUNTER (OUTPATIENT)
Age: 78
Discharge: HOME | End: 2023-01-31
Payer: OTHER GOVERNMENT

## 2023-01-31 DIAGNOSIS — I50.20 HFREF (HEART FAILURE WITH REDUCED EJECTION FRACTION) (HCC): ICD-10-CM

## 2023-01-31 LAB
ANION GAP SERPL CALCULATED.3IONS-SCNC: 16 MMOL/L (ref 7–16)
BUN BLDV-MCNC: 29 MG/DL (ref 6–23)
CALCIUM SERPL-MCNC: 10.3 MG/DL (ref 8.6–10.2)
CHLORIDE BLD-SCNC: 100 MMOL/L (ref 98–107)
CO2: 23 MMOL/L (ref 22–29)
CREAT SERPL-MCNC: 1.1 MG/DL (ref 0.7–1.2)
GFR SERPL CREATININE-BSD FRML MDRD: >60 ML/MIN/1.73
GLUCOSE BLD-MCNC: 104 MG/DL (ref 74–99)
POTASSIUM SERPL-SCNC: 4.3 MMOL/L (ref 3.5–5)
SODIUM BLD-SCNC: 139 MMOL/L (ref 132–146)

## 2023-01-31 PROCEDURE — 36415 COLL VENOUS BLD VENIPUNCTURE: CPT

## 2023-01-31 PROCEDURE — 80048 BASIC METABOLIC PNL TOTAL CA: CPT

## 2023-02-01 ENCOUNTER — OFFICE VISIT (OUTPATIENT)
Dept: PRIMARY CARE CLINIC | Age: 78
End: 2023-02-01

## 2023-02-01 VITALS
HEART RATE: 52 BPM | SYSTOLIC BLOOD PRESSURE: 118 MMHG | TEMPERATURE: 97.3 F | OXYGEN SATURATION: 98 % | DIASTOLIC BLOOD PRESSURE: 64 MMHG | WEIGHT: 167 LBS | BODY MASS INDEX: 27.79 KG/M2

## 2023-02-01 DIAGNOSIS — Z00.00 MEDICARE ANNUAL WELLNESS VISIT, SUBSEQUENT: Primary | ICD-10-CM

## 2023-02-01 DIAGNOSIS — I50.22 CHRONIC SYSTOLIC (CONGESTIVE) HEART FAILURE (HCC): ICD-10-CM

## 2023-02-01 DIAGNOSIS — I48.0 PAF (PAROXYSMAL ATRIAL FIBRILLATION) (HCC): ICD-10-CM

## 2023-02-01 DIAGNOSIS — F41.8 DEPRESSION WITH ANXIETY: ICD-10-CM

## 2023-02-01 RX ORDER — CLONAZEPAM 0.5 MG/1
TABLET ORAL
Qty: 270 TABLET | Refills: 0 | Status: SHIPPED | OUTPATIENT
Start: 2023-02-01 | End: 2023-05-26

## 2023-02-01 ASSESSMENT — PATIENT HEALTH QUESTIONNAIRE - PHQ9
9. THOUGHTS THAT YOU WOULD BE BETTER OFF DEAD, OR OF HURTING YOURSELF: 0
SUM OF ALL RESPONSES TO PHQ9 QUESTIONS 1 & 2: 0
SUM OF ALL RESPONSES TO PHQ QUESTIONS 1-9: 0
1. LITTLE INTEREST OR PLEASURE IN DOING THINGS: 0
SUM OF ALL RESPONSES TO PHQ QUESTIONS 1-9: 0
SUM OF ALL RESPONSES TO PHQ QUESTIONS 1-9: 0
2. FEELING DOWN, DEPRESSED OR HOPELESS: 0
7. TROUBLE CONCENTRATING ON THINGS, SUCH AS READING THE NEWSPAPER OR WATCHING TELEVISION: 0
8. MOVING OR SPEAKING SO SLOWLY THAT OTHER PEOPLE COULD HAVE NOTICED. OR THE OPPOSITE, BEING SO FIGETY OR RESTLESS THAT YOU HAVE BEEN MOVING AROUND A LOT MORE THAN USUAL: 0
3. TROUBLE FALLING OR STAYING ASLEEP: 0
6. FEELING BAD ABOUT YOURSELF - OR THAT YOU ARE A FAILURE OR HAVE LET YOURSELF OR YOUR FAMILY DOWN: 0
SUM OF ALL RESPONSES TO PHQ QUESTIONS 1-9: 0
10. IF YOU CHECKED OFF ANY PROBLEMS, HOW DIFFICULT HAVE THESE PROBLEMS MADE IT FOR YOU TO DO YOUR WORK, TAKE CARE OF THINGS AT HOME, OR GET ALONG WITH OTHER PEOPLE: 0
4. FEELING TIRED OR HAVING LITTLE ENERGY: 0
5. POOR APPETITE OR OVEREATING: 0

## 2023-02-01 ASSESSMENT — LIFESTYLE VARIABLES
HAS A RELATIVE, FRIEND, DOCTOR, OR ANOTHER HEALTH PROFESSIONAL EXPRESSED CONCERN ABOUT YOUR DRINKING OR SUGGESTED YOU CUT DOWN: 0
HOW OFTEN DURING THE LAST YEAR HAVE YOU NEEDED AN ALCOHOLIC DRINK FIRST THING IN THE MORNING TO GET YOURSELF GOING AFTER A NIGHT OF HEAVY DRINKING: 0
HOW OFTEN DO YOU HAVE A DRINK CONTAINING ALCOHOL: 4 OR MORE TIMES A WEEK
HOW MANY STANDARD DRINKS CONTAINING ALCOHOL DO YOU HAVE ON A TYPICAL DAY: 1 OR 2
HOW OFTEN DURING THE LAST YEAR HAVE YOU FAILED TO DO WHAT WAS NORMALLY EXPECTED FROM YOU BECAUSE OF DRINKING: 0
HOW OFTEN DURING THE LAST YEAR HAVE YOU FOUND THAT YOU WERE NOT ABLE TO STOP DRINKING ONCE YOU HAD STARTED: 0
HAVE YOU OR SOMEONE ELSE BEEN INJURED AS A RESULT OF YOUR DRINKING: 0
HOW OFTEN DURING THE LAST YEAR HAVE YOU BEEN UNABLE TO REMEMBER WHAT HAPPENED THE NIGHT BEFORE BECAUSE YOU HAD BEEN DRINKING: 0
HOW OFTEN DURING THE LAST YEAR HAVE YOU HAD A FEELING OF GUILT OR REMORSE AFTER DRINKING: 0

## 2023-02-01 NOTE — PATIENT INSTRUCTIONS
Learning About Dental Care for Older Adults  Dental care for older adults: Overview  Dental care for older people is much the same as for younger adults. But older adults do have concerns that younger adults do not. Older adults may have problems with gum disease and decay on the roots of their teeth. They may need missing teeth replaced or broken fillings fixed. Or they may have dentures that need to be cared for. Some older adults may have trouble holding a toothbrush. You can help remind the person you are caring for to brush and floss their teeth or to clean their dentures. In some cases, you may need to do the brushing and other dental care tasks. People who have trouble using their hands or who have dementia may need this extra help. How can you help with dental care? Normal dental care  To keep the teeth and gums healthy:  Brush the teeth with fluoride toothpaste twice a day--in the morning and at night--and floss at least once a day. Plaque can quickly build up on the teeth of older adults. Watch for the signs of gum disease. These signs include gums that bleed after brushing or after eating hard foods, such as apples. See a dentist regularly. Many experts recommend checkups every 6 months. Keep the dentist up to date on any new medications the person is taking. Encourage a balanced diet that includes whole grains, vegetables, and fruits, and that is low in saturated fat and sodium. Encourage the person you're caring for not to use tobacco products. They can affect dental and general health. Many older adults have a fixed income and feel that they can't afford dental care. But most [facility] and [facility] have programs in which dentists help older adults by lowering fees. Contact your area's public health offices or  for information about dental care in your area.   Using a toothbrush  Older adults with arthritis sometimes have trouble brushing their teeth because they can't easily hold the toothbrush. Their hands and fingers may be stiff, painful, or weak. If this is the case, you can: Offer an electric toothbrush. Enlarge the handle of a non-electric toothbrush by wrapping a sponge, an elastic bandage, or adhesive tape around it. Push the toothbrush handle through a ball made of rubber or soft foam.  Make the handle longer and thicker by taping Popsicle sticks or tongue depressors to it. You may also be able to buy special toothbrushes, toothpaste dispensers, and floss holders. Your doctor may recommend a soft-bristle toothbrush if the person you care for bleeds easily. Bleeding can happen because of a health problem or from certain medicines. A toothpaste for sensitive teeth may help if the person you care for has sensitive teeth. How do you brush and floss someone's teeth? If the person you are caring for has a hard time cleaning their teeth on their own, you may need to brush and floss their teeth for them. It may be easiest to have the person sit and face away from you, and to sit or stand behind them. That way you can steady their head against your arm as you reach around to floss and brush their teeth. Choose a place that has good lighting and is comfortable for both of you. Before you begin, gather your supplies. You will need gloves, floss, a toothbrush, and a container to hold water if you are not near a sink. Wash and dry your hands well and put on gloves. Start by flossing:  Gently work a piece of floss between each of the teeth toward the gums. A plastic flossing tool may make this easier, and they are available at most [facility]. Curve the floss around each tooth into a U-shape and gently slide it under the gum line. Move the floss firmly up and down several times to scrape off the plaque. After you've finished flossing, throw away the used floss and begin brushing:  Wet the brush and apply toothpaste. Place the brush at a 45-degree angle where the teeth meet the gums. Press firmly, and move the brush in small circles over the surface of the teeth. Be careful not to brush too hard. Vigorous brushing can make the gums pull away from the teeth and can scratch the tooth enamel. Brush all surfaces of the teeth, on the tongue side and on the cheek side. Pay special attention to the front teeth and all surfaces of the back teeth. Brush chewing surfaces with short back-and-forth strokes. After you've finished, help the person rinse the remaining toothpaste from their mouth. Where can you learn more? Go to http://www.woods.com/ and enter F944 to learn more about \"Learning About Dental Care for Older Adults. \"  Current as of: June 16, 2022               Content Version: 13.5  © 2006-2022 Healthwise, [employer]. Care instructions adapted under license by [facility] ([facility]). If you have questions about a medical condition or this instruction, always ask your healthcare professional. [place] [address] any warranty or liability for your use of this information. Advance Directives: Care Instructions  Overview  An advance directive is a legal way to state your wishes at the end of your life. It tells your family and your doctor what to do if you can't say what you want. There are two main types of advance directives. You can change them any time your wishes change. Living will. This form tells your family and your doctor your wishes about life support and other treatment. The form is also called a declaration. Medical power of . This form lets you name a person to make treatment decisions for you when you can't speak for yourself. This person is called a health care agent (health care proxy, health care surrogate). The form is also called a durable power of  for health care.   If you do not have an advance directive, decisions about your medical care may be made by a family member, or by a doctor or a  who doesn't know you.  It may help to think of an advance directive as a gift to the people who care for you. If you have one, they won't have to make tough decisions by themselves.  For more information, including forms for your state, see the [employer] website (www.caringinfo.org/planning/advance-directives/).  Follow-up care is a key part of your treatment and safety. Be sure to make and go to all appointments, and call your doctor if you are having problems. It's also a good idea to know your test results and keep a list of the medicines you take.  What should you include in an advance directive?  Many states have a unique advance directive form. (It may ask you to address specific issues.) Or you might use a universal form that's approved by many states.  If your form doesn't tell you what to address, it may be hard to know what to include in your advance directive. Use the questions below to help you get started.  Who do you want to make decisions about your medical care if you are not able to?  What life-support measures do you want if you have a serious illness that gets worse over time or can't be cured?  What are you most afraid of that might happen? (Maybe you're afraid of having pain, losing your independence, or being kept alive by machines.)  Where would you prefer to die? (Your home? A hospital? A nursing home?)  Do you want to donate your organs when you die?  Do you want certain [employer] practices performed before you die?  When should you call for help?  Be sure to contact your doctor if you have any questions.  Where can you learn more?  Go to https://www.healthClearAccess.net/patientEd and enter R264 to learn more about \"Advance Directives: Care Instructions.\"  Current as of: June 16, 2022               Content Version: 13.5  © 0122-1200 Healthwise, Incorporated.   Care instructions adapted under license by [facility]. If you have questions about a medical condition or this instruction, always ask your healthcare  professional. [address] any warranty or liability for your use of this information. A Healthy Heart: Care Instructions  Your Care Instructions     Coronary artery disease, also called heart disease, occurs when a substance called plaque builds up in the vessels that supply oxygen-rich blood to your heart muscle. This can narrow the blood vessels and reduce blood flow. A heart attack happens when blood flow is completely blocked. A high-fat diet, smoking, and other factors increase the risk of heart disease. Your doctor has found that you have a chance of having heart disease. You can do lots of things to keep your heart healthy. It may not be easy, but you can change your diet, exercise more, and quit smoking. These steps really work to lower your chance of heart disease. Follow-up care is a key part of your treatment and safety. Be sure to make and go to all appointments, and call your doctor if you are having problems. It's also a good idea to know your test results and keep a list of the medicines you take. How can you care for yourself at home? Diet    Use less salt when you cook and eat. This helps lower your blood pressure. Taste food before salting. Add only a little salt when you think you need it. With time, your taste buds will adjust to less salt.     Eat fewer snack items, fast foods, canned soups, and other high-salt, high-fat, processed foods.     Read food labels and try to avoid saturated and trans fats. They increase your risk of heart disease by raising cholesterol levels.     Limit the amount of solid fat-butter, margarine, and shortening-you eat. Use olive, peanut, or canola oil when you cook. Bake, broil, and steam foods instead of frying them.     Eat a variety of fruit and vegetables every day. Dark green, deep orange, red, or yellow fruits and vegetables are especially good for you.  Examples include spinach, carrots, peaches, and berries.     Foods high in fiber can reduce your cholesterol and provide important vitamins and minerals. High-fiber foods include whole-grain cereals and breads, oatmeal, beans, brown rice, citrus fruits, and apples.     Eat lean proteins. Heart-healthy proteins include seafood, lean meats and poultry, eggs, beans, peas, nuts, seeds, and soy products.     Limit drinks and foods with added sugar. These include candy, desserts, and soda pop. Lifestyle changes    If your doctor recommends it, get more exercise. Walking is a good choice. Bit by bit, increase the amount you walk every day. Try for at least 30 minutes on most days of the week. You also may want to swim, bike, or do other activities.     Do not smoke. If you need help quitting, talk to your doctor about stop-smoking programs and medicines. These can increase your chances of quitting for good. Quitting smoking may be the most important step you can take to protect your heart. It is never too late to quit.     Limit alcohol to 2 drinks a day for men and 1 drink a day for women. Too much alcohol can cause health problems.     Manage other health problems such as diabetes, high blood pressure, and high cholesterol. If you think you may have a problem with alcohol or drug use, talk to your doctor. Medicines    Take your medicines exactly as prescribed. Call your doctor if you think you are having a problem with your medicine.     If your doctor recommends aspirin, take the amount directed each day. Make sure you take aspirin and not another kind of pain reliever, such as acetaminophen (Tylenol). When should you call for help? Call 911 if you have symptoms of a heart attack.  These may include:    Chest pain or pressure, or a strange feeling in the chest.     Sweating.     Shortness of breath.     Pain, pressure, or a strange feeling in the back, neck, jaw, or upper belly or in one or both shoulders or arms.     Lightheadedness or sudden weakness.     A fast or irregular heartbeat. After you call 911, the  may tell you to chew 1 adult-strength or 2 to 4 low-dose aspirin. Wait for an ambulance. Do not try to drive yourself. Watch closely for changes in your health, and be sure to contact your doctor if you have any problems. Where can you learn more? Go to http://www.badillo.com/ and enter F075 to learn more about \"A Healthy Heart: Care Instructions. \"  Current as of: September 7, 2022               Content Version: 13.5  © 3774-5179 Healthwise, [employer]. Care instructions adapted under license by [facility] ([facility]). If you have questions about a medical condition or this instruction, always ask your healthcare professional. [address] any warranty or liability for your use of this information. Personalized Preventive Plan for Colette Nick - 2/1/2023  Medicare offers a range of preventive health benefits. Some of the tests and screenings are paid in full while other may be subject to a deductible, co-insurance, and/or copay. Some of these benefits include a comprehensive review of your medical history including lifestyle, illnesses that may run in your family, and various assessments and screenings as appropriate. After reviewing your medical record and screening and assessments performed today your provider may have ordered immunizations, labs, imaging, and/or referrals for you. A list of these orders (if applicable) as well as your Preventive Care list are included within your After Visit Summary for your review. Other Preventive Recommendations:    A preventive eye exam performed by an eye specialist is recommended every 1-2 years to screen for glaucoma; cataracts, macular degeneration, and other eye disorders. A preventive dental visit is recommended every 6 months. Try to get at least 150 minutes of exercise per week or 10,000 steps per day on a pedometer .   Order or download the FREE \"Exercise & Physical Activity: Your Everyday Guide\" from [employer]. Call 8-921.482.3434 or search [employer] online. You need 1706-5539 mg of calcium and 4578-6390 IU of vitamin D per day. It is possible to meet your calcium requirement with diet alone, but a vitamin D supplement is usually necessary to meet this goal.  When exposed to the sun, use a sunscreen that protects against both UVA and UVB radiation with an SPF of 30 or greater. Reapply every 2 to 3 hours or after sweating, drying off with a towel, or swimming. Always wear a seat belt when traveling in a car. Always wear a helmet when riding a bicycle or motorcycle.

## 2023-02-01 NOTE — PROGRESS NOTES
Medicare Annual Wellness Visit    Asa Mejia is here for Priscila CHAN    Assessment & Plan   Medicare annual wellness visit, subsequent  Depression with anxiety  -     clonazePAM (KLONOPIN) 0.5 MG tablet; TAKE 1 TABLET BY MOUTH THREE TIMES DAILY AS NEEDED FOR ANXIETY, Disp-270 tablet, R-0Normal  PAF (paroxysmal atrial fibrillation) (HCC)  Chronic systolic (congestive) heart failure    Recommendations for Preventive Services Due: see orders and patient instructions/AVS.  Recommended screening schedule for the next 5-10 years is provided to the patient in written form: see Patient Instructions/AVS.     Return in 3 months (on 5/1/2023) for Medicare Annual Wellness Visit in 1 year, or for acute problem. Subjective   The following acute and/or chronic problems were also addressed today:    Asa Mejia, a male of 68 y.o. came to the office 2/1/2023. Patient Active Problem List   Diagnosis    Depression with anxiety    Moderate asthma without complication    Asthma exacerbation    Acute heart failure, unspecified heart failure type (HCC)    PAF (paroxysmal atrial fibrillation) (HCC)    Chronic systolic (congestive) heart failure          Anxiety  Presents for follow-up visit. Symptoms include insomnia and nervous/anxious behavior. Patient reports no depressed mood, excessive worry, irritability or panic. Symptoms occur most days. - doing well on Klonopin.      No Known Allergies    Current Outpatient Medications on File Prior to Visit   Medication Sig Dispense Refill    apixaban (ELIQUIS) 5 MG TABS tablet Take 1 tablet by mouth 2 times daily 180 tablet 3    sacubitril-valsartan (ENTRESTO) 24-26 MG per tablet Take 0.5 tablets by mouth 2 times daily 180 tablet 3    furosemide (LASIX) 40 MG tablet Take 1 tablet by mouth daily 90 tablet 3    spironolactone (ALDACTONE) 25 MG tablet Take 0.5 tablets by mouth daily 45 tablet 3    metoprolol succinate (TOPROL XL) 25 MG extended release tablet Take 1 tablet by mouth daily 90 tablet 3    lamoTRIgine (LAMICTAL) 200 MG tablet Take 200 mg by mouth daily      acyclovir (ZOVIRAX) 200 MG capsule Take 400 mg by mouth 2 times daily      albuterol (PROVENTIL) (5 MG/ML) 0.5% nebulizer solution Take 1 mL by nebulization 4 times daily as needed for Wheezing 120 each 3    fluticasone (FLONASE) 50 MCG/ACT nasal spray 2 sprays by Nasal route daily 1 Bottle 3    SYMBICORT 160-4.5 MCG/ACT AERO Inhale 2 puffs into the lungs 2 times daily       No current facility-administered medications on file prior to visit.       Review of Systems   Constitutional:  Negative for irritability.   Psychiatric/Behavioral:  The patient is nervous/anxious and has insomnia.  other review of systems reviewed and are negative    OBJECTIVE:  /64   Pulse 52   Temp 97.3 °F (36.3 °C)   Wt 167 lb (75.8 kg)   SpO2 98%   BMI 27.79 kg/m²      Physical Exam see below    ASSESSMENT AND PLAN:    Perry was seen today for medicare aw.    Diagnoses and all orders for this visit:    Medicare annual wellness visit, subsequent    Depression with anxiety  -     clonazePAM (KLONOPIN) 0.5 MG tablet; TAKE 1 TABLET BY MOUTH THREE TIMES DAILY AS NEEDED FOR ANXIETY    PAF (paroxysmal atrial fibrillation) (HCC)    Chronic systolic (congestive) heart failure    - reviewed cardio note and hosp notes  - continue Klonopin at current dose and strength.  - continue meds    Return in 3 months (on 5/1/2023) for Medicare Annual Wellness Visit in 1 year, or for acute problem.    Jude Carney, DO      Patient's complete Health Risk Assessment and screening values have been reviewed and are found in Flowsheets. The following problems were reviewed today and where indicated follow up appointments were made and/or referrals ordered.    No Positive Risk Factors identified today.                      Dentist Screen:  Have you seen the dentist within the past year?: (!) No    Intervention:  Advised to schedule with  their dentist                  Objective   Vitals:    02/01/23 1102   BP: 118/64   Pulse: 52   Temp: 97.3 °F (36.3 °C)   SpO2: 98%   Weight: 167 lb (75.8 kg)      Body mass index is 27.79 kg/m². General Appearance: alert and oriented to person, place and time, well developed and well- nourished, in no acute distress  Skin: warm and dry, no rash or erythema  Head: normocephalic and atraumatic  Eyes: pupils equal, round, and reactive to light, extraocular eye movements intact, conjunctivae normal  ENT: tympanic membrane, external ear and ear canal normal bilaterally, nose without deformity, nasal mucosa and turbinates normal without polyps  Neck: supple and non-tender without mass, no thyromegaly or thyroid nodules, no cervical lymphadenopathy  Pulmonary/Chest: clear to auscultation bilaterally- no wheezes, rales or rhonchi, normal air movement, no respiratory distress  Cardiovascular: bradycardic rate, regular rhythm, normal S1 and S2, no murmurs, rubs, clicks, or gallops, distal pulses intact, no carotid bruits  Abdomen: soft, non-tender, non-distended, normal bowel sounds, no masses or organomegaly  Extremities: no cyanosis, clubbing or edema  Musculoskeletal: normal range of motion, no joint swelling, deformity or tenderness  Neurologic: reflexes normal and symmetric, no cranial nerve deficit, gait, coordination and speech normal       No Known Allergies  Prior to Visit Medications    Medication Sig Taking?  Authorizing Provider   clonazePAM (KLONOPIN) 0.5 MG tablet TAKE 1 TABLET BY MOUTH THREE TIMES DAILY AS NEEDED FOR ANXIETY Yes Jude Carney,    apixaban (ELIQUIS) 5 MG TABS tablet Take 1 tablet by mouth 2 times daily  Soheila Jameson,    sacubitril-valsartan (ENTRESTO) 24-26 MG per tablet Take 0.5 tablets by mouth 2 times daily  Soheila Jameson, DO   furosemide (LASIX) 40 MG tablet Take 1 tablet by mouth daily  Soheila Jameson, DO   spironolactone (ALDACTONE) 25 MG tablet Take 0.5 tablets by mouth daily  Matt Fisher DO   metoprolol succinate (TOPROL XL) 25 MG extended release tablet Take 1 tablet by mouth daily  Matt Fisher DO   lamoTRIgine (LAMICTAL) 200 MG tablet Take 200 mg by mouth daily  Historical Provider, MD   acyclovir (ZOVIRAX) 200 MG capsule Take 400 mg by mouth 2 times daily  Historical Provider, MD   albuterol (PROVENTIL) (5 MG/ML) 0.5% nebulizer solution Take 1 mL by nebulization 4 times daily as needed for Wheezing  Elli Alejo MD   fluticasone (FLONASE) 50 MCG/ACT nasal spray 2 sprays by Nasal route daily  Jude Carney DO   SYMBICORT 160-4.5 MCG/ACT AERO Inhale 2 puffs into the lungs 2 times daily  Historical Provider, MD [place] (Including outside providers/suppliers regularly involved in providing care):   Patient Care Team:  Gavino Reese DO as PCP - General  Gavino Reese DO as PCP - Empaneled Provider     Reviewed and updated this visit:  Tobacco  Allergies  Meds  Problems  Med Hx  Surg Hx  Soc Hx  Fam Hx               Crystal Carney DO

## 2023-02-15 LAB
CALCIUM SERPL-MCNC: NORMAL MG/DL
VITAMIN D 25-HYDROXY: NORMAL
VITAMIN D2, 25 HYDROXY: NORMAL
VITAMIN D3,25 HYDROXY: NORMAL

## 2023-02-26 NOTE — PROGRESS NOTES
DEPARTMENT OF RADIATION ONCOLOGY ON TREATMENT VISIT         8/26/2020      NAME:  Perry Wilder    YOB: 1945    Diagnosis:  Prostate cancer    SUBJECTIVE:   Rory Eng has now received fractionated external beam radiation therapy - ongoing. Past medical, surgical, social and family histories reviewed and updated as indicated. Pain: controlled    ALLERGIES:  Patient has no known allergies. Current Outpatient Medications   Medication Sig Dispense Refill    buPROPion (WELLBUTRIN) 75 MG tablet Take 150 mg by mouth 2 times daily      OLANZapine zydis (ZYPREXA) 5 MG disintegrating tablet Take 5 mg by mouth nightly      BUDESONIDE ER PO Take 4.5 mcg by mouth      lamoTRIgine (LAMICTAL) 150 MG tablet Take 150 mg by mouth daily      clonazePAM (KLONOPIN) 0.5 MG tablet TAKE 1 TABLET BY MOUTH THREE TIMES DAILY AS NEEDED FOR ANXIETY 270 tablet 0    fluticasone (FLONASE) 50 MCG/ACT nasal spray 2 sprays by Nasal route daily 1 Bottle 3    Misc Natural Products (PROSTATE SUPPORT PO) Take by mouth daily      SYMBICORT 160-4.5 MCG/ACT AERO 2 puffs as needed        No current facility-administered medications for this encounter. OBJECTIVE:  Alert and fully ambulatory. Pleasant and conversant. Physical Examination: General appearance - alert, well appearing, and in no distress. Wt Readings from Last 3 Encounters:   08/26/20 168 lb (76.2 kg)   07/31/20 166 lb (75.3 kg)   06/15/20 166 lb 11.2 oz (75.6 kg)         ASSESSMENT/PLAN:     Patient is tolerating treatments well with expected toxicities. RBA were reviewed prior to first fraction and PRN. Current and planned dose reviewed. Goals of treatment and potential side effects were reviewed with the patient PRN. Treatment imaging has been personally reviewed for accuracy and precision. Questions answered to apparent satisfaction. Treatments will continue as planned. Markos Ortiz.  Miguel Goff, Her/She

## 2023-03-11 NOTE — PROGRESS NOTES
DEPARTMENT OF RADIATION ONCOLOGY ON TREATMENT VISIT         10/14/2020      NAME:  Perry Wilder    YOB: 1945    Diagnosis: prostate cancer    SUBJECTIVE:   Makayla Jolly has now received fractionated external beam radiation therapy - ongoing. Past medical, surgical, social and family histories reviewed and updated as indicated. Pain: controlled    ALLERGIES:  Patient has no known allergies. Current Outpatient Medications   Medication Sig Dispense Refill    tamsulosin (FLOMAX) 0.4 MG capsule Take 0.4 mg by mouth daily In evening      buPROPion (WELLBUTRIN) 75 MG tablet Take 150 mg by mouth 2 times daily      OLANZapine zydis (ZYPREXA) 5 MG disintegrating tablet Take 5 mg by mouth nightly      BUDESONIDE ER PO Take 4.5 mcg by mouth      lamoTRIgine (LAMICTAL) 150 MG tablet Take 150 mg by mouth daily      clonazePAM (KLONOPIN) 0.5 MG tablet TAKE 1 TABLET BY MOUTH THREE TIMES DAILY AS NEEDED FOR ANXIETY 270 tablet 0    fluticasone (FLONASE) 50 MCG/ACT nasal spray 2 sprays by Nasal route daily 1 Bottle 3    Misc Natural Products (PROSTATE SUPPORT PO) Take by mouth daily      SYMBICORT 160-4.5 MCG/ACT AERO 2 puffs as needed        No current facility-administered medications for this encounter. OBJECTIVE:  Alert and fully ambulatory. Pleasant and conversant. Physical Examination: General appearance - alert, well appearing, and in no distress. Wt Readings from Last 3 Encounters:   10/14/20 160 lb (72.6 kg)   10/07/20 160 lb 6 oz (72.7 kg)   09/30/20 160 lb 9.6 oz (72.8 kg)         ASSESSMENT/PLAN:     Patient is tolerating treatments well with expected toxicities. RBA were reviewed prior to first fraction and PRN. Current and planned dose reviewed. Goals of treatment and potential side effects were reviewed with the patient PRN. Treatment imaging has been personally reviewed for accuracy and precision.     Questions answered to apparent satisfaction. Treatments will continue as planned. Felicia Schaefer.  Lindsay Pedroza MD MS DABR  Radiation Oncologist        [facility] ([address] [place] [address]): [phone] /// FAX: [phone]  [facility]): [phone] /// FAX: [phone]  [address] [place] [address]): [phone] /// FAX: [phone] Yes

## 2023-03-20 ENCOUNTER — TELEPHONE (OUTPATIENT)
Dept: CARDIOLOGY CLINIC | Age: 78
End: 2023-03-20

## 2023-03-20 DIAGNOSIS — I50.20 HFREF (HEART FAILURE WITH REDUCED EJECTION FRACTION) (HCC): Primary | ICD-10-CM

## 2023-03-20 NOTE — TELEPHONE ENCOUNTER
Patient's wife called stating patient is scheduled for echo and F/U on 3/28/23.  However, he seems to be having more dyspnea and is coughing.  He did have a recent cold that he is now over.

## 2023-03-24 ENCOUNTER — HOSPITAL ENCOUNTER (OUTPATIENT)
Dept: OTHER | Age: 78
Setting detail: THERAPIES SERIES
Discharge: HOME | End: 2023-03-24
Payer: MEDICARE

## 2023-03-24 VITALS
WEIGHT: 169 LBS | SYSTOLIC BLOOD PRESSURE: 127 MMHG | DIASTOLIC BLOOD PRESSURE: 58 MMHG | OXYGEN SATURATION: 92 % | RESPIRATION RATE: 18 BRPM | HEART RATE: 68 BPM | BODY MASS INDEX: 28.12 KG/M2

## 2023-03-24 LAB
ANION GAP SERPL CALCULATED.3IONS-SCNC: 10 MMOL/L (ref 7–16)
BNP BLD-MCNC: 574 PG/ML (ref 0–450)
BUN SERPL-MCNC: 35 MG/DL (ref 6–23)
CALCIUM SERPL-MCNC: 10.4 MG/DL (ref 8.6–10.2)
CHLORIDE SERPL-SCNC: 103 MMOL/L (ref 98–107)
CO2 SERPL-SCNC: 28 MMOL/L (ref 22–29)
CREAT SERPL-MCNC: 0.9 MG/DL (ref 0.7–1.2)
GLUCOSE SERPL-MCNC: 100 MG/DL (ref 74–99)
POTASSIUM SERPL-SCNC: 4.3 MMOL/L (ref 3.5–5)
SODIUM SERPL-SCNC: 141 MMOL/L (ref 132–146)

## 2023-03-24 PROCEDURE — 83880 ASSAY OF NATRIURETIC PEPTIDE: CPT

## 2023-03-24 PROCEDURE — 99204 OFFICE O/P NEW MOD 45 MIN: CPT

## 2023-03-24 PROCEDURE — 36415 COLL VENOUS BLD VENIPUNCTURE: CPT

## 2023-03-24 PROCEDURE — 80048 BASIC METABOLIC PNL TOTAL CA: CPT

## 2023-03-24 NOTE — PROGRESS NOTES
Congestive Heart Failure [address] [place] [address] [address]   1945          Referring Provider: Era Cortez  Primary Care Physician:   Cardiologist: Era Cortez  Nephrologist: n/a        History of Present Illness:     Yobani Hatfield is a 68 y.o. male with a history of HFrEF, most recent EF 35-40%. Patient Story:    He does  have dyspnea with exertion, shortness of breath, or decline in overall functional capacity. He does have orthopnea, PND, nocturnal cough or hemoptysis. He does not have abdominal distention or bloating, early satiety, anorexia/change in appetite. He does has a good urinary response to  oral diuretic. He does not have  lower extremity edema. He denies lightheadedness, dizziness. He denies palpitations, syncope or near syncope. He does not complain of chest pain, pressure, discomfort. No Known Allergies      No outpatient medications have been marked as taking for the 3/24/23 encounter [facility] Encounter) with [facility] ROOM 1.      Current Outpatient Medications on File Prior to Encounter   Medication Sig Dispense Refill    apixaban (ELIQUIS) 5 MG TABS tablet Take 1 tablet by mouth 2 times daily 180 tablet 3    sacubitril-valsartan (ENTRESTO) 24-26 MG per tablet Take 0.5 tablets by mouth 2 times daily 180 tablet 3    furosemide (LASIX) 40 MG tablet Take 1 tablet by mouth daily 90 tablet 3    clonazePAM (KLONOPIN) 0.5 MG tablet TAKE 1 TABLET BY MOUTH THREE TIMES DAILY AS NEEDED FOR ANXIETY 270 tablet 0    spironolactone (ALDACTONE) 25 MG tablet Take 0.5 tablets by mouth daily 45 tablet 3    metoprolol succinate (TOPROL XL) 25 MG extended release tablet Take 1 tablet by mouth daily 90 tablet 3    lamoTRIgine (LAMICTAL) 200 MG tablet Take 200 mg by mouth daily (Patient not taking: Reported on 3/24/2023)      acyclovir (ZOVIRAX) 200 MG capsule Take 400 mg by mouth 2 times daily      albuterol (PROVENTIL) (5 MG/ML) 0.5%

## 2023-03-24 NOTE — PROGRESS NOTES
Reviewed labs with patient's wife and she stated he just walked to the attic and has increased shortness of breath. At CHF clinic appointment, assessment was unremarkable. Told patient's wife if patient continues to have shortness of breath to go to the ER.

## 2023-03-24 NOTE — PLAN OF CARE
Problem: Chronic Conditions and Co-morbidities  Goal: Patient's chronic conditions and co-morbidity symptoms are monitored and maintained or improved  Flowsheets (Taken 3/24/2023 9355)  Care Plan - Patient's Chronic Conditions and Co-Morbidity Symptoms are Monitored and Maintained or Improved: Monitor and assess patient's chronic conditions and comorbid symptoms for stability, deterioration, or improvement

## 2023-03-28 ENCOUNTER — OFFICE VISIT (OUTPATIENT)
Dept: CARDIOLOGY CLINIC | Age: 78
End: 2023-03-28
Payer: MEDICARE

## 2023-03-28 ENCOUNTER — HOSPITAL ENCOUNTER (OUTPATIENT)
Dept: CARDIOLOGY | Age: 78
Discharge: HOME | End: 2023-03-28
Payer: MEDICARE

## 2023-03-28 VITALS
DIASTOLIC BLOOD PRESSURE: 78 MMHG | HEIGHT: 66 IN | SYSTOLIC BLOOD PRESSURE: 120 MMHG | BODY MASS INDEX: 27.05 KG/M2 | WEIGHT: 168.3 LBS | OXYGEN SATURATION: 94 % | HEART RATE: 63 BPM | RESPIRATION RATE: 16 BRPM

## 2023-03-28 DIAGNOSIS — I50.22 CHRONIC SYSTOLIC (CONGESTIVE) HEART FAILURE (HCC): ICD-10-CM

## 2023-03-28 DIAGNOSIS — I50.20 HFREF (HEART FAILURE WITH REDUCED EJECTION FRACTION) (HCC): ICD-10-CM

## 2023-03-28 DIAGNOSIS — I48.0 PAF (PAROXYSMAL ATRIAL FIBRILLATION) (HCC): ICD-10-CM

## 2023-03-28 DIAGNOSIS — I50.20 HFREF (HEART FAILURE WITH REDUCED EJECTION FRACTION) (HCC): Primary | ICD-10-CM

## 2023-03-28 LAB
LV EF: 40 %
LVEF MODALITY: NORMAL

## 2023-03-28 PROCEDURE — 1123F ACP DISCUSS/DSCN MKR DOCD: CPT | Performed by: INTERNAL MEDICINE

## 2023-03-28 PROCEDURE — 93000 ELECTROCARDIOGRAM COMPLETE: CPT | Performed by: INTERNAL MEDICINE

## 2023-03-28 PROCEDURE — 99214 OFFICE O/P EST MOD 30 MIN: CPT | Performed by: INTERNAL MEDICINE

## 2023-03-28 PROCEDURE — 93308 TTE F-UP OR LMTD: CPT | Performed by: PSYCHIATRY & NEUROLOGY

## 2023-03-28 RX ORDER — CHOLECALCIFEROL (VITAMIN D3) 1250 MCG
CAPSULE ORAL
COMMUNITY

## 2023-03-28 RX ORDER — BUDESONIDE AND FORMOTEROL FUMARATE DIHYDRATE 160; 4.5 UG/1; UG/1
AEROSOL RESPIRATORY (INHALATION)
COMMUNITY
End: 2023-03-28

## 2023-03-28 NOTE — PROGRESS NOTES
Perry JAZZ Wilder  [birth year]  Date of Service: 3/28/2023    Patient Active Problem List    Diagnosis Date Noted    Chronic systolic (congestive) heart failure 02/01/2023     Priority: Medium    Acute heart failure, unspecified heart failure type ([facility] 12/30/2022     Priority: Medium    PAF (paroxysmal atrial fibrillation) ([facility] 12/29/2022     Priority: Medium    Asthma exacerbation 12/15/2021    Moderate asthma without complication 09/68/7619    Depression with anxiety 10/22/2014       Social History     Socioeconomic History    Marital status:      Spouse name: None    Number of children: None    Years of education: None    Highest education level: None   Tobacco Use    Smoking status: Never    Smokeless tobacco: Never   Vaping Use    Vaping Use: Never used   Substance and Sexual Activity    Alcohol use:  Yes     Alcohol/week: 2.0 standard drinks     Types: 2 Glasses of wine per week    Drug use: Never   Social History Narrative    Drinks 1 cup of coffee daily     Social Determinants of Health     Financial Resource Strain: Low Risk     Difficulty of Paying Living Expenses: Not hard at all   Food Insecurity: No Food Insecurity    Worried About Running Out of Food in the Last Year: Never true    Ran Out of Food in the Last Year: Never true   Physical Activity: Sufficiently Active    Days of Exercise per Week: 3 days    Minutes of Exercise per Session: 60 min       Current Outpatient Medications   Medication Sig Dispense Refill    Cholecalciferol (VITAMIN D3) 1.25 MG (31117 UT) CAPS Vitamin D3   4.000 IU QD      clonazePAM (KLONOPIN) 0.5 MG tablet TAKE 1 TABLET BY MOUTH THREE TIMES DAILY AS NEEDED FOR ANXIETY 270 tablet 0    spironolactone (ALDACTONE) 25 MG tablet Take 0.5 tablets by mouth daily 45 tablet 3    metoprolol succinate (TOPROL XL) 25 MG extended release tablet Take 1 tablet by mouth daily 90 tablet 3    apixaban (ELIQUIS) 5 MG TABS tablet Take 1 tablet by mouth 2 times daily 180 tablet 3

## 2023-04-04 ENCOUNTER — HOSPITAL ENCOUNTER (OUTPATIENT)
Dept: OTHER | Age: 78
Setting detail: THERAPIES SERIES
Discharge: HOME | End: 2023-04-04
Payer: MEDICARE

## 2023-04-04 VITALS
DIASTOLIC BLOOD PRESSURE: 64 MMHG | RESPIRATION RATE: 18 BRPM | WEIGHT: 166 LBS | HEART RATE: 67 BPM | SYSTOLIC BLOOD PRESSURE: 122 MMHG | BODY MASS INDEX: 26.79 KG/M2 | OXYGEN SATURATION: 96 %

## 2023-04-04 LAB
ANION GAP SERPL CALCULATED.3IONS-SCNC: 12 MMOL/L (ref 7–16)
BNP BLD-MCNC: 469 PG/ML (ref 0–450)
BUN SERPL-MCNC: 23 MG/DL (ref 6–23)
CALCIUM SERPL-MCNC: 10.7 MG/DL (ref 8.6–10.2)
CHLORIDE SERPL-SCNC: 102 MMOL/L (ref 98–107)
CO2 SERPL-SCNC: 25 MMOL/L (ref 22–29)
CREAT SERPL-MCNC: 0.8 MG/DL (ref 0.7–1.2)
GLUCOSE SERPL-MCNC: 105 MG/DL (ref 74–99)
POTASSIUM SERPL-SCNC: 3.9 MMOL/L (ref 3.5–5)
SODIUM SERPL-SCNC: 139 MMOL/L (ref 132–146)

## 2023-04-04 PROCEDURE — 80048 BASIC METABOLIC PNL TOTAL CA: CPT

## 2023-04-04 PROCEDURE — 99214 OFFICE O/P EST MOD 30 MIN: CPT

## 2023-04-04 PROCEDURE — 83880 ASSAY OF NATRIURETIC PEPTIDE: CPT

## 2023-04-04 PROCEDURE — 36415 COLL VENOUS BLD VENIPUNCTURE: CPT

## 2023-04-04 NOTE — PROGRESS NOTES
nebulization 4 times daily as needed for Wheezing 120 each 3    fluticasone (FLONASE) 50 MCG/ACT nasal spray 2 sprays by Nasal route daily (Patient not taking: Reported on 3/24/2023) 1 Bottle 3    SYMBICORT 160-4.5 MCG/ACT AERO Inhale 2 puffs into the lungs 2 times daily       No current facility-administered medications on file prior to encounter. Guideline directed medical:  ARNI/ACE I/ARB: Yes Entresto 24-26 mg twice a day  Beta blocker:  YesToprol XL 25 ng daily  Aldosterone antagonist:  Yes Aldactone  SGLT2i:  no        Physical Examination:     /64   Pulse 67   Resp 18   Wt 166 lb (75.3 kg)   SpO2 96%   BMI 26.79 kg/m²     Assessment  Charting Type: Admission              HEENT (Head, Ears, Eyes, Nose, & Throat)  HEENT (WDL): Within Defined Limits    Respiratory  Respiratory Pattern: Regular  Respiratory Depth: Normal  Respiratory Quality/Effort: Unlabored, Dyspnea with exertion  Chest Assessment: Chest expansion symmetrical  L Breath Sounds: Diminished, Clear  R Breath Sounds:  Inspiratory Wheezes, Diminished              Cardiac  Cardiac Rhythm: Sinus rhythm    Rhythm Interpretation  Heart Rate: 67         Gastrointestinal  Abdominal (WDL): Within Defined Limits               Peripheral Vascular  Peripheral Vascular (WDL): Within Defined Limits  Edema: None                        Psychosocial  Psychosocial (WDL): Within Defined Limits                        Heart Rate: 67                     LAB DATA:    Last 3 BMP      Sodium (mmol/L)   Date Value   03/24/2023 141   01/31/2023 139   01/23/2023 136     Potassium (mmol/L)   Date Value   03/24/2023 4.3   01/31/2023 4.3   01/23/2023 4.3     Potassium reflex Magnesium (mmol/L)   Date Value   01/02/2023 3.5   01/01/2023 4.6   12/31/2022 3.7     Chloride (mmol/L)   Date Value   03/24/2023 103   01/31/2023 100   01/23/2023 102     CO2 (mmol/L)   Date Value   03/24/2023 28   01/31/2023 23   01/23/2023 24     BUN (mg/dL)   Date Value

## 2023-04-08 ENCOUNTER — HOSPITAL ENCOUNTER (INPATIENT)
Age: 78
LOS: 5 days | Discharge: HOME | DRG: 202 | End: 2023-04-13
Attending: EMERGENCY MEDICINE | Admitting: INTERNAL MEDICINE
Payer: OTHER GOVERNMENT

## 2023-04-08 ENCOUNTER — APPOINTMENT (OUTPATIENT)
Dept: GENERAL RADIOLOGY | Age: 78
DRG: 202 | End: 2023-04-08
Payer: OTHER GOVERNMENT

## 2023-04-08 DIAGNOSIS — R09.02 HYPOXIA: Primary | ICD-10-CM

## 2023-04-08 DIAGNOSIS — J45.41 MODERATE PERSISTENT ASTHMA WITH EXACERBATION: ICD-10-CM

## 2023-04-08 DIAGNOSIS — I50.9 CONGESTIVE HEART FAILURE, UNSPECIFIED HF CHRONICITY, UNSPECIFIED HEART FAILURE TYPE (HCC): ICD-10-CM

## 2023-04-08 LAB
ALBUMIN SERPL-MCNC: 4.7 G/DL (ref 3.5–5.2)
ALP SERPL-CCNC: 115 U/L (ref 40–129)
ALT SERPL-CCNC: 17 U/L (ref 0–40)
ANION GAP SERPL CALCULATED.3IONS-SCNC: 14 MMOL/L (ref 7–16)
AST SERPL-CCNC: 21 U/L (ref 0–39)
B PARAP IS1001 DNA NPH QL NAA+NON-PROBE: NOT DETECTED
B PERT.PT PRMT NPH QL NAA+NON-PROBE: NOT DETECTED
B.E.: 2.3 MMOL/L (ref -3–3)
BASOPHILS # BLD: 0.07 E9/L (ref 0–0.2)
BASOPHILS NFR BLD: 0.7 % (ref 0–2)
BILIRUB SERPL-MCNC: 0.5 MG/DL (ref 0–1.2)
BNP BLD-MCNC: 2076 PG/ML (ref 0–450)
BUN SERPL-MCNC: 15 MG/DL (ref 6–23)
C PNEUM DNA NPH QL NAA+NON-PROBE: NOT DETECTED
CALCIUM SERPL-MCNC: 10.4 MG/DL (ref 8.6–10.2)
CHLORIDE SERPL-SCNC: 103 MMOL/L (ref 98–107)
CO2 SERPL-SCNC: 25 MMOL/L (ref 22–29)
COHB: 0.8 % (ref 0–1.5)
CREAT SERPL-MCNC: 0.8 MG/DL (ref 0.7–1.2)
CRITICAL: ABNORMAL
DATE ANALYZED: ABNORMAL
DATE OF COLLECTION: ABNORMAL
EOSINOPHIL # BLD: 1.42 E9/L (ref 0.05–0.5)
EOSINOPHIL NFR BLD: 14.8 % (ref 0–6)
ERYTHROCYTE [DISTWIDTH] IN BLOOD BY AUTOMATED COUNT: 14.6 FL (ref 11.5–15)
FLUAV RNA NPH QL NAA+NON-PROBE: NOT DETECTED
FLUBV RNA NPH QL NAA+NON-PROBE: NOT DETECTED
GLUCOSE SERPL-MCNC: 133 MG/DL (ref 74–99)
HADV DNA NPH QL NAA+NON-PROBE: NOT DETECTED
HCO3: 26.9 MMOL/L (ref 22–26)
HCOV 229E RNA NPH QL NAA+NON-PROBE: NOT DETECTED
HCOV HKU1 RNA NPH QL NAA+NON-PROBE: NOT DETECTED
HCOV NL63 RNA NPH QL NAA+NON-PROBE: NOT DETECTED
HCOV OC43 RNA NPH QL NAA+NON-PROBE: NOT DETECTED
HCT VFR BLD AUTO: 45.7 % (ref 37–54)
HGB BLD-MCNC: 14.6 G/DL (ref 12.5–16.5)
HHB: 16.1 % (ref 0–5)
HMPV RNA NPH QL NAA+NON-PROBE: NOT DETECTED
HPIV1 RNA NPH QL NAA+NON-PROBE: NOT DETECTED
HPIV2 RNA NPH QL NAA+NON-PROBE: NOT DETECTED
HPIV3 RNA NPH QL NAA+NON-PROBE: NOT DETECTED
HPIV4 RNA NPH QL NAA+NON-PROBE: NOT DETECTED
IMM GRANULOCYTES # BLD: 0.02 E9/L
IMM GRANULOCYTES NFR BLD: 0.2 % (ref 0–5)
LAB: ABNORMAL
LYMPHOCYTES # BLD: 1.28 E9/L (ref 1.5–4)
LYMPHOCYTES NFR BLD: 13.3 % (ref 20–42)
Lab: ABNORMAL
M PNEUMO DNA NPH QL NAA+NON-PROBE: NOT DETECTED
MCH RBC QN AUTO: 31.5 PG (ref 26–35)
MCHC RBC AUTO-ENTMCNC: 31.9 % (ref 32–34.5)
MCV RBC AUTO: 98.7 FL (ref 80–99.9)
METHB: 0.3 % (ref 0–1.5)
MODE: ABNORMAL
MONOCYTES # BLD: 0.99 E9/L (ref 0.1–0.95)
MONOCYTES NFR BLD: 10.3 % (ref 2–12)
NEUTROPHILS # BLD: 5.82 E9/L (ref 1.8–7.3)
NEUTS SEG NFR BLD: 60.7 % (ref 43–80)
O2 CONTENT: 17.9 ML/DL
O2 SATURATION: 83.7 % (ref 92–98.5)
O2HB: 82.8 % (ref 94–97)
OPERATOR ID: 8217
PATIENT TEMP: 37 C
PCO2: 41.8 MMHG (ref 35–45)
PH BLOOD GAS: 7.43 (ref 7.35–7.45)
PLATELET # BLD AUTO: 245 E9/L (ref 130–450)
PMV BLD AUTO: 9.8 FL (ref 7–12)
PO2: 46.5 MMHG (ref 75–100)
POTASSIUM SERPL-SCNC: 4.5 MMOL/L (ref 3.5–5)
PROT SERPL-MCNC: 7.6 G/DL (ref 6.4–8.3)
RBC # BLD AUTO: 4.63 E12/L (ref 3.8–5.8)
RSV RNA NPH QL NAA+NON-PROBE: NOT DETECTED
RV+EV RNA NPH QL NAA+NON-PROBE: DETECTED
SARS-COV-2 RDRP RESP QL NAA+PROBE: NOT DETECTED
SARS-COV-2 RNA NPH QL NAA+NON-PROBE: NOT DETECTED
SODIUM SERPL-SCNC: 142 MMOL/L (ref 132–146)
SOURCE, BLOOD GAS: ABNORMAL
THB: 15.4 G/DL (ref 11.5–16.5)
TIME ANALYZED: 1921
TROPONIN, HIGH SENSITIVITY: 21 NG/L (ref 0–11)
WBC # BLD: 9.6 E9/L (ref 4.5–11.5)

## 2023-04-08 PROCEDURE — 87635 SARS-COV-2 COVID-19 AMP PRB: CPT

## 2023-04-08 PROCEDURE — 83880 ASSAY OF NATRIURETIC PEPTIDE: CPT

## 2023-04-08 PROCEDURE — 94664 DEMO&/EVAL PT USE INHALER: CPT

## 2023-04-08 PROCEDURE — 93005 ELECTROCARDIOGRAM TRACING: CPT | Performed by: INTERNAL MEDICINE

## 2023-04-08 PROCEDURE — 99223 1ST HOSP IP/OBS HIGH 75: CPT | Performed by: INTERNAL MEDICINE

## 2023-04-08 PROCEDURE — 2500000003 HC RX 250 WO HCPCS: Performed by: INTERNAL MEDICINE

## 2023-04-08 PROCEDURE — 2580000003 HC RX 258: Performed by: INTERNAL MEDICINE

## 2023-04-08 PROCEDURE — 99285 EMERGENCY DEPT VISIT HI MDM: CPT

## 2023-04-08 PROCEDURE — 71045 X-RAY EXAM CHEST 1 VIEW: CPT

## 2023-04-08 PROCEDURE — 6370000000 HC RX 637 (ALT 250 FOR IP): Performed by: INTERNAL MEDICINE

## 2023-04-08 PROCEDURE — 94640 AIRWAY INHALATION TREATMENT: CPT

## 2023-04-08 PROCEDURE — 0202U NFCT DS 22 TRGT SARS-COV-2: CPT

## 2023-04-08 PROCEDURE — 82805 BLOOD GASES W/O2 SATURATION: CPT

## 2023-04-08 PROCEDURE — 6370000000 HC RX 637 (ALT 250 FOR IP): Performed by: EMERGENCY MEDICINE

## 2023-04-08 PROCEDURE — 6360000002 HC RX W HCPCS: Performed by: INTERNAL MEDICINE

## 2023-04-08 PROCEDURE — 84484 ASSAY OF TROPONIN QUANT: CPT

## 2023-04-08 PROCEDURE — 2060000000 HC ICU INTERMEDIATE R&B

## 2023-04-08 PROCEDURE — 85025 COMPLETE CBC W/AUTO DIFF WBC: CPT

## 2023-04-08 PROCEDURE — 80053 COMPREHEN METABOLIC PANEL: CPT

## 2023-04-08 RX ORDER — SPIRONOLACTONE 25 MG/1
12.5 TABLET ORAL DAILY
Status: DISCONTINUED | OUTPATIENT
Start: 2023-04-08 | End: 2023-04-13 | Stop reason: HOSPADM

## 2023-04-08 RX ORDER — ACETAMINOPHEN 650 MG/1
650 SUPPOSITORY RECTAL EVERY 6 HOURS PRN
Status: DISCONTINUED | OUTPATIENT
Start: 2023-04-08 | End: 2023-04-13 | Stop reason: HOSPADM

## 2023-04-08 RX ORDER — IPRATROPIUM BROMIDE AND ALBUTEROL SULFATE 2.5; .5 MG/3ML; MG/3ML
1 SOLUTION RESPIRATORY (INHALATION)
Status: DISCONTINUED | OUTPATIENT
Start: 2023-04-08 | End: 2023-04-08

## 2023-04-08 RX ORDER — IPRATROPIUM BROMIDE AND ALBUTEROL SULFATE 2.5; .5 MG/3ML; MG/3ML
1 SOLUTION RESPIRATORY (INHALATION) 4 TIMES DAILY
Status: DISCONTINUED | OUTPATIENT
Start: 2023-04-09 | End: 2023-04-13 | Stop reason: HOSPADM

## 2023-04-08 RX ORDER — ONDANSETRON 2 MG/ML
4 INJECTION INTRAMUSCULAR; INTRAVENOUS EVERY 6 HOURS PRN
Status: DISCONTINUED | OUTPATIENT
Start: 2023-04-08 | End: 2023-04-13 | Stop reason: HOSPADM

## 2023-04-08 RX ORDER — BUDESONIDE AND FORMOTEROL FUMARATE DIHYDRATE 160; 4.5 UG/1; UG/1
2 AEROSOL RESPIRATORY (INHALATION) 2 TIMES DAILY
Status: DISCONTINUED | OUTPATIENT
Start: 2023-04-08 | End: 2023-04-08 | Stop reason: CLARIF

## 2023-04-08 RX ORDER — CLONAZEPAM 0.5 MG/1
0.5 TABLET ORAL 3 TIMES DAILY PRN
Status: DISCONTINUED | OUTPATIENT
Start: 2023-04-08 | End: 2023-04-13 | Stop reason: HOSPADM

## 2023-04-08 RX ORDER — ACYCLOVIR 200 MG/1
400 CAPSULE ORAL 2 TIMES DAILY
Status: DISCONTINUED | OUTPATIENT
Start: 2023-04-08 | End: 2023-04-09

## 2023-04-08 RX ORDER — SODIUM CHLORIDE 9 MG/ML
INJECTION, SOLUTION INTRAVENOUS PRN
Status: DISCONTINUED | OUTPATIENT
Start: 2023-04-08 | End: 2023-04-13 | Stop reason: HOSPADM

## 2023-04-08 RX ORDER — IPRATROPIUM BROMIDE AND ALBUTEROL SULFATE 2.5; .5 MG/3ML; MG/3ML
1 SOLUTION RESPIRATORY (INHALATION) EVERY 4 HOURS PRN
Status: DISCONTINUED | OUTPATIENT
Start: 2023-04-08 | End: 2023-04-13 | Stop reason: HOSPADM

## 2023-04-08 RX ORDER — FUROSEMIDE 40 MG/1
40 TABLET ORAL DAILY
Status: DISCONTINUED | OUTPATIENT
Start: 2023-04-08 | End: 2023-04-10

## 2023-04-08 RX ORDER — ARFORMOTEROL TARTRATE 15 UG/2ML
15 SOLUTION RESPIRATORY (INHALATION) 2 TIMES DAILY
Status: DISCONTINUED | OUTPATIENT
Start: 2023-04-08 | End: 2023-04-13 | Stop reason: HOSPADM

## 2023-04-08 RX ORDER — IPRATROPIUM BROMIDE AND ALBUTEROL SULFATE 2.5; .5 MG/3ML; MG/3ML
1 SOLUTION RESPIRATORY (INHALATION)
Status: COMPLETED | OUTPATIENT
Start: 2023-04-08 | End: 2023-04-08

## 2023-04-08 RX ORDER — METHYLPREDNISOLONE SODIUM SUCCINATE 40 MG/ML
40 INJECTION, POWDER, LYOPHILIZED, FOR SOLUTION INTRAMUSCULAR; INTRAVENOUS EVERY 6 HOURS
Status: DISCONTINUED | OUTPATIENT
Start: 2023-04-08 | End: 2023-04-10

## 2023-04-08 RX ORDER — IPRATROPIUM BROMIDE AND ALBUTEROL SULFATE 2.5; .5 MG/3ML; MG/3ML
1 SOLUTION RESPIRATORY (INHALATION) ONCE
Status: COMPLETED | OUTPATIENT
Start: 2023-04-08 | End: 2023-04-08

## 2023-04-08 RX ORDER — METOPROLOL SUCCINATE 25 MG/1
25 TABLET, EXTENDED RELEASE ORAL DAILY
Status: DISCONTINUED | OUTPATIENT
Start: 2023-04-08 | End: 2023-04-13 | Stop reason: HOSPADM

## 2023-04-08 RX ORDER — SODIUM CHLORIDE 0.9 % (FLUSH) 0.9 %
5-40 SYRINGE (ML) INJECTION PRN
Status: DISCONTINUED | OUTPATIENT
Start: 2023-04-08 | End: 2023-04-13 | Stop reason: HOSPADM

## 2023-04-08 RX ORDER — SODIUM CHLORIDE 0.9 % (FLUSH) 0.9 %
5-40 SYRINGE (ML) INJECTION EVERY 12 HOURS SCHEDULED
Status: DISCONTINUED | OUTPATIENT
Start: 2023-04-08 | End: 2023-04-13 | Stop reason: HOSPADM

## 2023-04-08 RX ORDER — POLYETHYLENE GLYCOL 3350 17 G/17G
17 POWDER, FOR SOLUTION ORAL DAILY PRN
Status: DISCONTINUED | OUTPATIENT
Start: 2023-04-08 | End: 2023-04-13 | Stop reason: HOSPADM

## 2023-04-08 RX ORDER — ACETAMINOPHEN 325 MG/1
650 TABLET ORAL EVERY 6 HOURS PRN
Status: DISCONTINUED | OUTPATIENT
Start: 2023-04-08 | End: 2023-04-13 | Stop reason: HOSPADM

## 2023-04-08 RX ORDER — BUDESONIDE 0.5 MG/2ML
0.5 INHALANT ORAL 2 TIMES DAILY
Status: DISCONTINUED | OUTPATIENT
Start: 2023-04-08 | End: 2023-04-13 | Stop reason: HOSPADM

## 2023-04-08 RX ORDER — ONDANSETRON 4 MG/1
4 TABLET, ORALLY DISINTEGRATING ORAL EVERY 8 HOURS PRN
Status: DISCONTINUED | OUTPATIENT
Start: 2023-04-08 | End: 2023-04-13 | Stop reason: HOSPADM

## 2023-04-08 RX ORDER — FUROSEMIDE 10 MG/ML
40 INJECTION INTRAMUSCULAR; INTRAVENOUS ONCE
Status: COMPLETED | OUTPATIENT
Start: 2023-04-08 | End: 2023-04-09

## 2023-04-08 RX ADMIN — SODIUM CHLORIDE, PRESERVATIVE FREE 10 ML: 5 INJECTION INTRAVENOUS at 19:47

## 2023-04-08 RX ADMIN — CLONAZEPAM 0.5 MG: 0.5 TABLET ORAL at 10:23

## 2023-04-08 RX ADMIN — IPRATROPIUM BROMIDE AND ALBUTEROL SULFATE 1 AMPULE: 2.5; .5 SOLUTION RESPIRATORY (INHALATION) at 04:25

## 2023-04-08 RX ADMIN — IPRATROPIUM BROMIDE AND ALBUTEROL SULFATE 1 AMPULE: .5; 3 SOLUTION RESPIRATORY (INHALATION) at 16:07

## 2023-04-08 RX ADMIN — METOPROLOL SUCCINATE 25 MG: 25 TABLET, EXTENDED RELEASE ORAL at 10:25

## 2023-04-08 RX ADMIN — IPRATROPIUM BROMIDE AND ALBUTEROL SULFATE 1 AMPULE: .5; 2.5 SOLUTION RESPIRATORY (INHALATION) at 19:49

## 2023-04-08 RX ADMIN — BUDESONIDE 500 MCG: 0.5 SUSPENSION RESPIRATORY (INHALATION) at 19:37

## 2023-04-08 RX ADMIN — DOXYCYCLINE 100 MG: 100 INJECTION, POWDER, LYOPHILIZED, FOR SOLUTION INTRAVENOUS at 22:20

## 2023-04-08 RX ADMIN — METHYLPREDNISOLONE SODIUM SUCCINATE 40 MG: 40 INJECTION, POWDER, FOR SOLUTION INTRAMUSCULAR; INTRAVENOUS at 10:26

## 2023-04-08 RX ADMIN — BUDESONIDE 500 MCG: 0.5 SUSPENSION RESPIRATORY (INHALATION) at 11:21

## 2023-04-08 RX ADMIN — SODIUM CHLORIDE, PRESERVATIVE FREE 10 ML: 5 INJECTION INTRAVENOUS at 10:35

## 2023-04-08 RX ADMIN — ACYCLOVIR 400 MG: 200 CAPSULE ORAL at 20:21

## 2023-04-08 RX ADMIN — ARFORMOTEROL TARTRATE 15 MCG: 15 SOLUTION RESPIRATORY (INHALATION) at 11:21

## 2023-04-08 RX ADMIN — APIXABAN 5 MG: 5 TABLET, FILM COATED ORAL at 10:24

## 2023-04-08 RX ADMIN — WATER 1000 MG: 1 INJECTION INTRAMUSCULAR; INTRAVENOUS; SUBCUTANEOUS at 10:21

## 2023-04-08 RX ADMIN — SACUBITRIL AND VALSARTAN 1 TABLET: 24; 26 TABLET, FILM COATED ORAL at 19:43

## 2023-04-08 RX ADMIN — FUROSEMIDE 40 MG: 20 TABLET ORAL at 10:24

## 2023-04-08 RX ADMIN — METHYLPREDNISOLONE SODIUM SUCCINATE 40 MG: 40 INJECTION, POWDER, FOR SOLUTION INTRAMUSCULAR; INTRAVENOUS at 23:29

## 2023-04-08 RX ADMIN — IPRATROPIUM BROMIDE AND ALBUTEROL SULFATE 1 AMPULE: .5; 2.5 SOLUTION RESPIRATORY (INHALATION) at 07:17

## 2023-04-08 RX ADMIN — DOXYCYCLINE 100 MG: 100 INJECTION, POWDER, LYOPHILIZED, FOR SOLUTION INTRAVENOUS at 10:30

## 2023-04-08 RX ADMIN — ARFORMOTEROL TARTRATE 15 MCG: 15 SOLUTION RESPIRATORY (INHALATION) at 19:37

## 2023-04-08 RX ADMIN — CLONAZEPAM 0.5 MG: 0.5 TABLET ORAL at 20:21

## 2023-04-08 RX ADMIN — IPRATROPIUM BROMIDE AND ALBUTEROL SULFATE 1 AMPULE: 2.5; .5 SOLUTION RESPIRATORY (INHALATION) at 04:24

## 2023-04-08 RX ADMIN — ACETAMINOPHEN 650 MG: 325 TABLET ORAL at 10:23

## 2023-04-08 RX ADMIN — METHYLPREDNISOLONE SODIUM SUCCINATE 40 MG: 40 INJECTION, POWDER, FOR SOLUTION INTRAMUSCULAR; INTRAVENOUS at 19:44

## 2023-04-08 RX ADMIN — APIXABAN 5 MG: 5 TABLET, FILM COATED ORAL at 19:43

## 2023-04-08 RX ADMIN — SACUBITRIL AND VALSARTAN 1 TABLET: 24; 26 TABLET, FILM COATED ORAL at 10:24

## 2023-04-08 RX ADMIN — SPIRONOLACTONE 12.5 MG: 25 TABLET ORAL at 10:24

## 2023-04-08 RX ADMIN — ACYCLOVIR 400 MG: 200 CAPSULE ORAL at 10:25

## 2023-04-08 RX ADMIN — IPRATROPIUM BROMIDE AND ALBUTEROL SULFATE 1 AMPULE: 2.5; .5 SOLUTION RESPIRATORY (INHALATION) at 04:27

## 2023-04-08 RX ADMIN — IPRATROPIUM BROMIDE AND ALBUTEROL SULFATE 1 AMPULE: .5; 3 SOLUTION RESPIRATORY (INHALATION) at 11:21

## 2023-04-08 ASSESSMENT — PAIN - FUNCTIONAL ASSESSMENT: PAIN_FUNCTIONAL_ASSESSMENT: NONE - DENIES PAIN

## 2023-04-09 LAB
ALBUMIN SERPL-MCNC: 4.1 G/DL (ref 3.5–5.2)
ALP SERPL-CCNC: 101 U/L (ref 40–129)
ALT SERPL-CCNC: 15 U/L (ref 0–40)
ANION GAP SERPL CALCULATED.3IONS-SCNC: 10 MMOL/L (ref 7–16)
AST SERPL-CCNC: 25 U/L (ref 0–39)
BASOPHILS # BLD: 0 E9/L (ref 0–0.2)
BASOPHILS NFR BLD: 0 % (ref 0–2)
BILIRUB SERPL-MCNC: 0.4 MG/DL (ref 0–1.2)
BNP BLD-MCNC: 3113 PG/ML (ref 0–450)
BUN SERPL-MCNC: 33 MG/DL (ref 6–23)
CALCIUM SERPL-MCNC: 10.4 MG/DL (ref 8.6–10.2)
CHLORIDE SERPL-SCNC: 104 MMOL/L (ref 98–107)
CO2 SERPL-SCNC: 26 MMOL/L (ref 22–29)
CREAT SERPL-MCNC: 0.8 MG/DL (ref 0.7–1.2)
CRP SERPL HS-MCNC: <0.3 MG/DL (ref 0–0.4)
EKG ATRIAL RATE: 127 BPM
EKG Q-T INTERVAL: 300 MS
EKG QRS DURATION: 150 MS
EKG QTC CALCULATION (BAZETT): 433 MS
EKG R AXIS: -56 DEGREES
EKG T AXIS: 72 DEGREES
EKG VENTRICULAR RATE: 125 BPM
EOSINOPHIL # BLD: 0 E9/L (ref 0.05–0.5)
EOSINOPHIL NFR BLD: 0 % (ref 0–6)
ERYTHROCYTE [DISTWIDTH] IN BLOOD BY AUTOMATED COUNT: 14.5 FL (ref 11.5–15)
GLUCOSE SERPL-MCNC: 169 MG/DL (ref 74–99)
HCT VFR BLD AUTO: 41.8 % (ref 37–54)
HGB BLD-MCNC: 13.2 G/DL (ref 12.5–16.5)
IMM GRANULOCYTES # BLD: 0.05 E9/L
IMM GRANULOCYTES NFR BLD: 0.7 % (ref 0–5)
LYMPHOCYTES # BLD: 0.85 E9/L (ref 1.5–4)
LYMPHOCYTES NFR BLD: 12.2 % (ref 20–42)
MAGNESIUM SERPL-MCNC: 2.3 MG/DL (ref 1.6–2.6)
MCH RBC QN AUTO: 31.4 PG (ref 26–35)
MCHC RBC AUTO-ENTMCNC: 31.6 % (ref 32–34.5)
MCV RBC AUTO: 99.5 FL (ref 80–99.9)
MONOCYTES # BLD: 0.18 E9/L (ref 0.1–0.95)
MONOCYTES NFR BLD: 2.6 % (ref 2–12)
NEUTROPHILS # BLD: 5.88 E9/L (ref 1.8–7.3)
NEUTS SEG NFR BLD: 84.5 % (ref 43–80)
PLATELET # BLD AUTO: 246 E9/L (ref 130–450)
PMV BLD AUTO: 10.2 FL (ref 7–12)
POTASSIUM SERPL-SCNC: 4.8 MMOL/L (ref 3.5–5)
PROCALCITONIN: 0.08 NG/ML (ref 0–0.08)
PROT SERPL-MCNC: 6.9 G/DL (ref 6.4–8.3)
RBC # BLD AUTO: 4.2 E12/L (ref 3.8–5.8)
SODIUM SERPL-SCNC: 140 MMOL/L (ref 132–146)
WBC # BLD: 7 E9/L (ref 4.5–11.5)

## 2023-04-09 PROCEDURE — 36415 COLL VENOUS BLD VENIPUNCTURE: CPT

## 2023-04-09 PROCEDURE — 80053 COMPREHEN METABOLIC PANEL: CPT

## 2023-04-09 PROCEDURE — 2700000000 HC OXYGEN THERAPY PER DAY

## 2023-04-09 PROCEDURE — 2060000000 HC ICU INTERMEDIATE R&B

## 2023-04-09 PROCEDURE — 6370000000 HC RX 637 (ALT 250 FOR IP): Performed by: INTERNAL MEDICINE

## 2023-04-09 PROCEDURE — 94640 AIRWAY INHALATION TREATMENT: CPT

## 2023-04-09 PROCEDURE — 99233 SBSQ HOSP IP/OBS HIGH 50: CPT | Performed by: INTERNAL MEDICINE

## 2023-04-09 PROCEDURE — 85025 COMPLETE CBC W/AUTO DIFF WBC: CPT

## 2023-04-09 PROCEDURE — 2500000003 HC RX 250 WO HCPCS: Performed by: INTERNAL MEDICINE

## 2023-04-09 PROCEDURE — 6360000002 HC RX W HCPCS: Performed by: INTERNAL MEDICINE

## 2023-04-09 PROCEDURE — 2580000003 HC RX 258: Performed by: INTERNAL MEDICINE

## 2023-04-09 PROCEDURE — 83880 ASSAY OF NATRIURETIC PEPTIDE: CPT

## 2023-04-09 PROCEDURE — 93010 ELECTROCARDIOGRAM REPORT: CPT | Performed by: INTERNAL MEDICINE

## 2023-04-09 PROCEDURE — 84145 PROCALCITONIN (PCT): CPT

## 2023-04-09 PROCEDURE — 86140 C-REACTIVE PROTEIN: CPT

## 2023-04-09 PROCEDURE — 83735 ASSAY OF MAGNESIUM: CPT

## 2023-04-09 RX ORDER — DIPHENHYDRAMINE HCL 25 MG
25 TABLET ORAL NIGHTLY PRN
Status: DISCONTINUED | OUTPATIENT
Start: 2023-04-09 | End: 2023-04-13 | Stop reason: HOSPADM

## 2023-04-09 RX ORDER — FUROSEMIDE 10 MG/ML
20 INJECTION INTRAMUSCULAR; INTRAVENOUS ONCE
Status: COMPLETED | OUTPATIENT
Start: 2023-04-09 | End: 2023-04-09

## 2023-04-09 RX ORDER — ACYCLOVIR 200 MG/1
200 CAPSULE ORAL DAILY
Status: DISCONTINUED | OUTPATIENT
Start: 2023-04-10 | End: 2023-04-13 | Stop reason: HOSPADM

## 2023-04-09 RX ADMIN — WATER 1000 MG: 1 INJECTION INTRAMUSCULAR; INTRAVENOUS; SUBCUTANEOUS at 10:42

## 2023-04-09 RX ADMIN — FUROSEMIDE 40 MG: 20 TABLET ORAL at 10:00

## 2023-04-09 RX ADMIN — APIXABAN 5 MG: 5 TABLET, FILM COATED ORAL at 20:10

## 2023-04-09 RX ADMIN — SACUBITRIL AND VALSARTAN 1 TABLET: 24; 26 TABLET, FILM COATED ORAL at 20:10

## 2023-04-09 RX ADMIN — SODIUM CHLORIDE, PRESERVATIVE FREE 10 ML: 5 INJECTION INTRAVENOUS at 10:38

## 2023-04-09 RX ADMIN — METHYLPREDNISOLONE SODIUM SUCCINATE 40 MG: 40 INJECTION, POWDER, FOR SOLUTION INTRAMUSCULAR; INTRAVENOUS at 06:06

## 2023-04-09 RX ADMIN — APIXABAN 5 MG: 5 TABLET, FILM COATED ORAL at 10:42

## 2023-04-09 RX ADMIN — SACUBITRIL AND VALSARTAN 1 TABLET: 24; 26 TABLET, FILM COATED ORAL at 10:00

## 2023-04-09 RX ADMIN — DOXYCYCLINE 100 MG: 100 INJECTION, POWDER, LYOPHILIZED, FOR SOLUTION INTRAVENOUS at 10:41

## 2023-04-09 RX ADMIN — BUDESONIDE 500 MCG: 0.5 SUSPENSION RESPIRATORY (INHALATION) at 08:43

## 2023-04-09 RX ADMIN — METHYLPREDNISOLONE SODIUM SUCCINATE 40 MG: 40 INJECTION, POWDER, FOR SOLUTION INTRAMUSCULAR; INTRAVENOUS at 10:39

## 2023-04-09 RX ADMIN — METHYLPREDNISOLONE SODIUM SUCCINATE 40 MG: 40 INJECTION, POWDER, FOR SOLUTION INTRAMUSCULAR; INTRAVENOUS at 16:43

## 2023-04-09 RX ADMIN — ACYCLOVIR 400 MG: 200 CAPSULE ORAL at 10:39

## 2023-04-09 RX ADMIN — DIPHENHYDRAMINE HYDROCHLORIDE 25 MG: 25 TABLET ORAL at 20:10

## 2023-04-09 RX ADMIN — IPRATROPIUM BROMIDE AND ALBUTEROL SULFATE 1 AMPULE: .5; 2.5 SOLUTION RESPIRATORY (INHALATION) at 08:43

## 2023-04-09 RX ADMIN — FUROSEMIDE 20 MG: 10 INJECTION, SOLUTION INTRAMUSCULAR; INTRAVENOUS at 16:43

## 2023-04-09 RX ADMIN — FUROSEMIDE 40 MG: 10 INJECTION, SOLUTION INTRAMUSCULAR; INTRAVENOUS at 10:42

## 2023-04-09 RX ADMIN — ARFORMOTEROL TARTRATE 15 MCG: 15 SOLUTION RESPIRATORY (INHALATION) at 08:43

## 2023-04-09 RX ADMIN — SPIRONOLACTONE 12.5 MG: 25 TABLET ORAL at 10:39

## 2023-04-09 RX ADMIN — IPRATROPIUM BROMIDE AND ALBUTEROL SULFATE 1 AMPULE: .5; 2.5 SOLUTION RESPIRATORY (INHALATION) at 17:13

## 2023-04-09 RX ADMIN — METOPROLOL SUCCINATE 25 MG: 25 TABLET, EXTENDED RELEASE ORAL at 10:39

## 2023-04-09 ASSESSMENT — PAIN SCALES - GENERAL
PAINLEVEL_OUTOF10: 0
PAINLEVEL_OUTOF10: 0

## 2023-04-10 PROBLEM — I42.8 NICM (NONISCHEMIC CARDIOMYOPATHY) (HCC): Status: ACTIVE | Noted: 2023-04-10

## 2023-04-10 PROBLEM — I38 VHD (VALVULAR HEART DISEASE): Status: ACTIVE | Noted: 2023-04-10

## 2023-04-10 PROBLEM — R09.02 HYPOXIA: Status: ACTIVE | Noted: 2023-01-01

## 2023-04-10 PROBLEM — J96.01 ACUTE HYPOXEMIC RESPIRATORY FAILURE (HCC): Status: ACTIVE | Noted: 2023-04-10

## 2023-04-10 PROCEDURE — 6360000002 HC RX W HCPCS: Performed by: INTERNAL MEDICINE

## 2023-04-10 PROCEDURE — 87449 NOS EACH ORGANISM AG IA: CPT

## 2023-04-10 PROCEDURE — 6360000002 HC RX W HCPCS: Performed by: CLINICAL NURSE SPECIALIST

## 2023-04-10 PROCEDURE — 6370000000 HC RX 637 (ALT 250 FOR IP): Performed by: CLINICAL NURSE SPECIALIST

## 2023-04-10 PROCEDURE — APPSS60 APP SPLIT SHARED TIME 46-60 MINUTES: Performed by: CLINICAL NURSE SPECIALIST

## 2023-04-10 PROCEDURE — 2580000003 HC RX 258: Performed by: INTERNAL MEDICINE

## 2023-04-10 PROCEDURE — 2700000000 HC OXYGEN THERAPY PER DAY

## 2023-04-10 PROCEDURE — 87077 CULTURE AEROBIC IDENTIFY: CPT

## 2023-04-10 PROCEDURE — 6370000000 HC RX 637 (ALT 250 FOR IP): Performed by: INTERNAL MEDICINE

## 2023-04-10 PROCEDURE — 94669 MECHANICAL CHEST WALL OSCILL: CPT

## 2023-04-10 PROCEDURE — 87206 SMEAR FLUORESCENT/ACID STAI: CPT

## 2023-04-10 PROCEDURE — 2060000000 HC ICU INTERMEDIATE R&B

## 2023-04-10 PROCEDURE — 87070 CULTURE OTHR SPECIMN AEROBIC: CPT

## 2023-04-10 PROCEDURE — 87186 SC STD MICRODIL/AGAR DIL: CPT

## 2023-04-10 PROCEDURE — 94640 AIRWAY INHALATION TREATMENT: CPT

## 2023-04-10 PROCEDURE — 99223 1ST HOSP IP/OBS HIGH 75: CPT | Performed by: INTERNAL MEDICINE

## 2023-04-10 RX ORDER — METHYLPREDNISOLONE SODIUM SUCCINATE 40 MG/ML
40 INJECTION, POWDER, LYOPHILIZED, FOR SOLUTION INTRAMUSCULAR; INTRAVENOUS EVERY 8 HOURS
Status: ACTIVE | OUTPATIENT
Start: 2023-04-10 | End: 2023-04-11

## 2023-04-10 RX ORDER — FUROSEMIDE 10 MG/ML
40 INJECTION INTRAMUSCULAR; INTRAVENOUS 2 TIMES DAILY
Status: DISCONTINUED | OUTPATIENT
Start: 2023-04-10 | End: 2023-04-10

## 2023-04-10 RX ORDER — FUROSEMIDE 40 MG/1
40 TABLET ORAL DAILY
Status: DISCONTINUED | OUTPATIENT
Start: 2023-04-11 | End: 2023-04-13 | Stop reason: HOSPADM

## 2023-04-10 RX ADMIN — METHYLPREDNISOLONE SODIUM SUCCINATE 40 MG: 40 INJECTION, POWDER, FOR SOLUTION INTRAMUSCULAR; INTRAVENOUS at 05:35

## 2023-04-10 RX ADMIN — IPRATROPIUM BROMIDE AND ALBUTEROL SULFATE 1 AMPULE: .5; 2.5 SOLUTION RESPIRATORY (INHALATION) at 15:29

## 2023-04-10 RX ADMIN — IPRATROPIUM BROMIDE AND ALBUTEROL SULFATE 1 AMPULE: .5; 2.5 SOLUTION RESPIRATORY (INHALATION) at 20:50

## 2023-04-10 RX ADMIN — BUDESONIDE 500 MCG: 0.5 SUSPENSION RESPIRATORY (INHALATION) at 20:50

## 2023-04-10 RX ADMIN — BUDESONIDE 500 MCG: 0.5 SUSPENSION RESPIRATORY (INHALATION) at 08:40

## 2023-04-10 RX ADMIN — METHYLPREDNISOLONE SODIUM SUCCINATE 40 MG: 40 INJECTION, POWDER, FOR SOLUTION INTRAMUSCULAR; INTRAVENOUS at 20:20

## 2023-04-10 RX ADMIN — SACUBITRIL AND VALSARTAN 1 TABLET: 24; 26 TABLET, FILM COATED ORAL at 20:20

## 2023-04-10 RX ADMIN — IPRATROPIUM BROMIDE AND ALBUTEROL SULFATE 1 AMPULE: .5; 2.5 SOLUTION RESPIRATORY (INHALATION) at 11:47

## 2023-04-10 RX ADMIN — CLONAZEPAM 0.5 MG: 0.5 TABLET ORAL at 20:20

## 2023-04-10 RX ADMIN — APIXABAN 5 MG: 5 TABLET, FILM COATED ORAL at 09:40

## 2023-04-10 RX ADMIN — FUROSEMIDE 40 MG: 10 INJECTION, SOLUTION INTRAMUSCULAR; INTRAVENOUS at 12:20

## 2023-04-10 RX ADMIN — APIXABAN 5 MG: 5 TABLET, FILM COATED ORAL at 20:20

## 2023-04-10 RX ADMIN — IPRATROPIUM BROMIDE AND ALBUTEROL SULFATE 1 AMPULE: .5; 2.5 SOLUTION RESPIRATORY (INHALATION) at 08:40

## 2023-04-10 RX ADMIN — SODIUM CHLORIDE, PRESERVATIVE FREE 10 ML: 5 INJECTION INTRAVENOUS at 20:20

## 2023-04-10 RX ADMIN — ARFORMOTEROL TARTRATE 15 MCG: 15 SOLUTION RESPIRATORY (INHALATION) at 08:40

## 2023-04-10 RX ADMIN — METHYLPREDNISOLONE SODIUM SUCCINATE 40 MG: 40 INJECTION, POWDER, FOR SOLUTION INTRAMUSCULAR; INTRAVENOUS at 11:42

## 2023-04-10 RX ADMIN — ARFORMOTEROL TARTRATE 15 MCG: 15 SOLUTION RESPIRATORY (INHALATION) at 20:50

## 2023-04-10 RX ADMIN — SODIUM CHLORIDE, PRESERVATIVE FREE 10 ML: 5 INJECTION INTRAVENOUS at 09:40

## 2023-04-10 RX ADMIN — ACYCLOVIR 200 MG: 200 CAPSULE ORAL at 11:42

## 2023-04-10 ASSESSMENT — PAIN SCALES - GENERAL
PAINLEVEL_OUTOF10: 0
PAINLEVEL_OUTOF10: 0

## 2023-04-10 NOTE — PLAN OF CARE
Patient's chart updated to reflect:      . - HF care plan, HF education points and HF discharge instructions.  -Orders: 2 gram sodium diet, daily weights, I/O.  -PCP and cardiology follow up appointments to be scheduled within 7 days of hospital discharge. -CHF education session will be provided to the patient prior to hospital discharge.     Kaiden Leon RN RN, BSN  Heart Failure Navigator

## 2023-04-10 NOTE — ACP (ADVANCE CARE PLANNING)
Advance Care Planning   Healthcare Decision Maker:    Primary Decision Maker: Danitza Fierro Spouse - [phone]    Secondary Decision Maker: Carltonmonster Abdulkadir - Child - [phone]    Click here to complete Healthcare Decision Makers including selection of the Healthcare Decision Maker Relationship (ie \"Primary\").

## 2023-04-10 NOTE — CARE COORDINATION
Spoke with patient, accompanied by his wife. From home, 3 stories, 3 steps to enter, no assistive devices, no home o2, currently on 11L of o2 this morning and weaned to 8L. [facility], patient states he is 100% service connected and was exposed to agent orange in [facility]. Walking pulse ox needed on day of discharge. PCP is Dr. Quita Gomez, pharmacy is [facility]. Patient didn't want oxygen provided through [place] but otherwise has no preference of provider. Bathroom on the second floor and basement. [facility] and confirmed [facility] is service connected at 100%. Case Management Assessment  Initial Evaluation    Date/Time of Evaluation: 4/10/2023 11:19 AM  Assessment Completed by: Meg Jesus    If patient is discharged prior to next notation, then this note serves as note for discharge by case management. Patient Name: Ami Moore                   YOB: 1945  Diagnosis: Hypoxia [R09.02]  Moderate persistent asthma with exacerbation [J45.41]  Asthma in adult, moderate persistent, with acute exacerbation [J45.41]  Congestive heart failure, unspecified HF chronicity, unspecified heart failure type ([facility] [I50.9]                   Date / Time: 4/8/2023  4:11 AM    Patient Admission Status: Inpatient   Readmission Risk (Low < 19, Mod (19-27), High > 27): Readmission Risk Score: 10.9    Current PCP: Arnav Breg, DO  PCP verified by CM? Yes    Chart Reviewed: Yes      History Provided by: Patient, Significant Other  Patient Orientation: Alert and Oriented    Patient Cognition: Alert    Hospitalization in the last 30 days (Readmission):  No    If yes, Readmission Assessment in  Navigator will be completed.     Advance Directives:      Code Status: Full Code   Patient's Primary Decision Maker is: Legal Next of Kin    Primary Decision MakerNisreen Aguilera Spouse - [phone]    Secondary Decision Maker: Royal Lynn Wade -

## 2023-04-10 NOTE — PLAN OF CARE
Problem: Respiratory - Adult  Goal: Achieves optimal ventilation and oxygenation  Outcome: Progressing     Problem: Cardiovascular - Adult  Goal: Maintains optimal cardiac output and hemodynamic stability  Outcome: Progressing     Problem: Metabolic/Fluid and Electrolytes - Adult  Goal: Hemodynamic stability and optimal renal function maintained  Outcome: Progressing

## 2023-04-10 NOTE — DISCHARGE INSTRUCTIONS
gain of 3 pounds or more in 1 day         OR 5 pounds or more in one week  More shortness of breath  More swelling of your stomach, legs, ankles or feet  Feeling more tired, No energy  Dry hacky cough  Dizziness  More chest pain / discomfort       (CALL 911 IF ANY OF THE FOLLOWING OCCURS  Chest pain (not relieved with nitroglycerine, if you have been prescribed this medication)  Severe shortness of breath  Faint / Pass out  Confusion / cannot think clearly  If symptoms get worse           SMOKING - TOBACCO USE:  * IF YOU SMOKE - STOP! Kick the habit. [address] Program is offered at [facility] [address] and [address] [facility]. Call [phone] extension [phone] for more information. ACTIVITY:   (Ask your doctor when you will be able to return to work and before starting any exercise program.  Do not drive unless unless your doctor has given you permission to do so). Start light exercise. Even if you can only do a small amount, exercise will help you get stronger, have more energy, help manage your weight and decrease  stress. Walking is an easy way to get exercise. Start out slowly and  increase the amount you walk as tolerated  If you become short of breath, dizzy or have chest pain; stop, sit down, and rest.  If you feel \"wiped out\" the day after you exercise, walk at a slower pace or for a shorter distance. You can gradually increase the pace or amount of time. (Do not exercise right after a meal or in extreme temperatures, such as above 85 degrees, if the air is really humid, or wind chill is less than 20 degrees)                                             ADDITIONAL INFORMATION:  Avoid getting sick from colds and the flu. Stay away from friends or family that you know may have a contagious illness  Get plenty of rest   Get a flu shot each year. Get a pneumococcal vaccine shot.  If you have had one before, ask your doctor

## 2023-04-11 LAB
ANION GAP SERPL CALCULATED.3IONS-SCNC: 14 MMOL/L (ref 7–16)
BUN SERPL-MCNC: 35 MG/DL (ref 6–23)
CALCIUM SERPL-MCNC: 10.4 MG/DL (ref 8.6–10.2)
CHLORIDE SERPL-SCNC: 100 MMOL/L (ref 98–107)
CO2 SERPL-SCNC: 25 MMOL/L (ref 22–29)
CREAT SERPL-MCNC: 0.7 MG/DL (ref 0.7–1.2)
GLUCOSE SERPL-MCNC: 138 MG/DL (ref 74–99)
LEGIONELLA AG UR QL: NORMAL
MAGNESIUM SERPL-MCNC: 2.5 MG/DL (ref 1.6–2.6)
POTASSIUM SERPL-SCNC: 4.6 MMOL/L (ref 3.5–5)
S PNEUM AG SPEC QL: NORMAL
SODIUM SERPL-SCNC: 139 MMOL/L (ref 132–146)

## 2023-04-11 PROCEDURE — 36415 COLL VENOUS BLD VENIPUNCTURE: CPT

## 2023-04-11 PROCEDURE — 2060000000 HC ICU INTERMEDIATE R&B

## 2023-04-11 PROCEDURE — 6370000000 HC RX 637 (ALT 250 FOR IP): Performed by: INTERNAL MEDICINE

## 2023-04-11 PROCEDURE — 2580000003 HC RX 258: Performed by: INTERNAL MEDICINE

## 2023-04-11 PROCEDURE — 80048 BASIC METABOLIC PNL TOTAL CA: CPT

## 2023-04-11 PROCEDURE — 83735 ASSAY OF MAGNESIUM: CPT

## 2023-04-11 PROCEDURE — 99232 SBSQ HOSP IP/OBS MODERATE 35: CPT | Performed by: INTERNAL MEDICINE

## 2023-04-11 PROCEDURE — 94640 AIRWAY INHALATION TREATMENT: CPT

## 2023-04-11 PROCEDURE — 2700000000 HC OXYGEN THERAPY PER DAY

## 2023-04-11 PROCEDURE — 6360000002 HC RX W HCPCS: Performed by: INTERNAL MEDICINE

## 2023-04-11 PROCEDURE — 6370000000 HC RX 637 (ALT 250 FOR IP): Performed by: CLINICAL NURSE SPECIALIST

## 2023-04-11 RX ORDER — METHYLPREDNISOLONE SODIUM SUCCINATE 40 MG/ML
40 INJECTION, POWDER, LYOPHILIZED, FOR SOLUTION INTRAMUSCULAR; INTRAVENOUS EVERY 12 HOURS
Status: COMPLETED | OUTPATIENT
Start: 2023-04-12 | End: 2023-04-12

## 2023-04-11 RX ADMIN — FUROSEMIDE 40 MG: 40 TABLET ORAL at 08:35

## 2023-04-11 RX ADMIN — SODIUM CHLORIDE, PRESERVATIVE FREE 10 ML: 5 INJECTION INTRAVENOUS at 08:39

## 2023-04-11 RX ADMIN — SPIRONOLACTONE 12.5 MG: 25 TABLET ORAL at 08:36

## 2023-04-11 RX ADMIN — SODIUM CHLORIDE, PRESERVATIVE FREE 10 ML: 5 INJECTION INTRAVENOUS at 20:09

## 2023-04-11 RX ADMIN — SACUBITRIL AND VALSARTAN 1 TABLET: 24; 26 TABLET, FILM COATED ORAL at 20:08

## 2023-04-11 RX ADMIN — IPRATROPIUM BROMIDE AND ALBUTEROL SULFATE 1 AMPULE: .5; 2.5 SOLUTION RESPIRATORY (INHALATION) at 12:48

## 2023-04-11 RX ADMIN — METOPROLOL SUCCINATE 25 MG: 25 TABLET, EXTENDED RELEASE ORAL at 12:00

## 2023-04-11 RX ADMIN — BUDESONIDE 500 MCG: 0.5 SUSPENSION RESPIRATORY (INHALATION) at 09:31

## 2023-04-11 RX ADMIN — ARFORMOTEROL TARTRATE 15 MCG: 15 SOLUTION RESPIRATORY (INHALATION) at 20:42

## 2023-04-11 RX ADMIN — IPRATROPIUM BROMIDE AND ALBUTEROL SULFATE 1 AMPULE: .5; 2.5 SOLUTION RESPIRATORY (INHALATION) at 16:53

## 2023-04-11 RX ADMIN — APIXABAN 5 MG: 5 TABLET, FILM COATED ORAL at 08:35

## 2023-04-11 RX ADMIN — CLONAZEPAM 0.5 MG: 0.5 TABLET ORAL at 19:14

## 2023-04-11 RX ADMIN — APIXABAN 5 MG: 5 TABLET, FILM COATED ORAL at 20:08

## 2023-04-11 RX ADMIN — ACYCLOVIR 200 MG: 200 CAPSULE ORAL at 08:34

## 2023-04-11 RX ADMIN — IPRATROPIUM BROMIDE AND ALBUTEROL SULFATE 1 AMPULE: .5; 2.5 SOLUTION RESPIRATORY (INHALATION) at 09:31

## 2023-04-11 RX ADMIN — IPRATROPIUM BROMIDE AND ALBUTEROL SULFATE 1 AMPULE: .5; 2.5 SOLUTION RESPIRATORY (INHALATION) at 20:42

## 2023-04-11 RX ADMIN — ARFORMOTEROL TARTRATE 15 MCG: 15 SOLUTION RESPIRATORY (INHALATION) at 09:31

## 2023-04-11 RX ADMIN — METHYLPREDNISOLONE SODIUM SUCCINATE 40 MG: 40 INJECTION, POWDER, FOR SOLUTION INTRAMUSCULAR; INTRAVENOUS at 12:00

## 2023-04-11 RX ADMIN — METHYLPREDNISOLONE SODIUM SUCCINATE 40 MG: 40 INJECTION, POWDER, FOR SOLUTION INTRAMUSCULAR; INTRAVENOUS at 23:33

## 2023-04-11 RX ADMIN — BUDESONIDE 500 MCG: 0.5 SUSPENSION RESPIRATORY (INHALATION) at 20:43

## 2023-04-11 RX ADMIN — SODIUM CHLORIDE, PRESERVATIVE FREE 10 ML: 5 INJECTION INTRAVENOUS at 03:36

## 2023-04-11 RX ADMIN — METHYLPREDNISOLONE SODIUM SUCCINATE 40 MG: 40 INJECTION, POWDER, FOR SOLUTION INTRAMUSCULAR; INTRAVENOUS at 03:36

## 2023-04-11 RX ADMIN — SACUBITRIL AND VALSARTAN 1 TABLET: 24; 26 TABLET, FILM COATED ORAL at 08:34

## 2023-04-11 RX ADMIN — EMPAGLIFLOZIN 10 MG: 10 TABLET, FILM COATED ORAL at 16:51

## 2023-04-11 ASSESSMENT — PAIN SCALES - GENERAL
PAINLEVEL_OUTOF10: 0

## 2023-04-11 NOTE — CARE COORDINATION
IV solumedrol continues. Patient weaned to 4L today. Will need walking pulse ox on day of discharge. No preference on DME provider when released. Updated Sandra Nowak at [address] on patient today. For questions I can be reached at [phone].  Avel [place], [place]

## 2023-04-12 ENCOUNTER — APPOINTMENT (OUTPATIENT)
Dept: GENERAL RADIOLOGY | Age: 78
DRG: 202 | End: 2023-04-12
Payer: OTHER GOVERNMENT

## 2023-04-12 LAB
ANION GAP SERPL CALCULATED.3IONS-SCNC: 10 MMOL/L (ref 7–16)
BUN SERPL-MCNC: 30 MG/DL (ref 6–23)
CALCIUM SERPL-MCNC: 10.4 MG/DL (ref 8.6–10.2)
CHLORIDE SERPL-SCNC: 103 MMOL/L (ref 98–107)
CO2 SERPL-SCNC: 28 MMOL/L (ref 22–29)
CREAT SERPL-MCNC: 0.7 MG/DL (ref 0.7–1.2)
GLUCOSE SERPL-MCNC: 134 MG/DL (ref 74–99)
MAGNESIUM SERPL-MCNC: 2.5 MG/DL (ref 1.6–2.6)
POTASSIUM SERPL-SCNC: 4.9 MMOL/L (ref 3.5–5)
SODIUM SERPL-SCNC: 141 MMOL/L (ref 132–146)

## 2023-04-12 PROCEDURE — 99233 SBSQ HOSP IP/OBS HIGH 50: CPT | Performed by: INTERNAL MEDICINE

## 2023-04-12 PROCEDURE — 83735 ASSAY OF MAGNESIUM: CPT

## 2023-04-12 PROCEDURE — 6370000000 HC RX 637 (ALT 250 FOR IP): Performed by: INTERNAL MEDICINE

## 2023-04-12 PROCEDURE — 94669 MECHANICAL CHEST WALL OSCILL: CPT

## 2023-04-12 PROCEDURE — 74022 RADEX COMPL AQT ABD SERIES: CPT

## 2023-04-12 PROCEDURE — 2060000000 HC ICU INTERMEDIATE R&B

## 2023-04-12 PROCEDURE — 2580000003 HC RX 258: Performed by: INTERNAL MEDICINE

## 2023-04-12 PROCEDURE — 94640 AIRWAY INHALATION TREATMENT: CPT

## 2023-04-12 PROCEDURE — 2700000000 HC OXYGEN THERAPY PER DAY

## 2023-04-12 PROCEDURE — 6360000002 HC RX W HCPCS: Performed by: INTERNAL MEDICINE

## 2023-04-12 PROCEDURE — 80048 BASIC METABOLIC PNL TOTAL CA: CPT

## 2023-04-12 PROCEDURE — 6370000000 HC RX 637 (ALT 250 FOR IP): Performed by: CLINICAL NURSE SPECIALIST

## 2023-04-12 PROCEDURE — 36415 COLL VENOUS BLD VENIPUNCTURE: CPT

## 2023-04-12 RX ORDER — CALCIUM CARBONATE 200(500)MG
500 TABLET,CHEWABLE ORAL 3 TIMES DAILY PRN
Status: DISCONTINUED | OUTPATIENT
Start: 2023-04-12 | End: 2023-04-13 | Stop reason: HOSPADM

## 2023-04-12 RX ORDER — PREDNISONE 10 MG/1
TABLET ORAL
Qty: 18 TABLET | Refills: 0 | Status: SHIPPED | OUTPATIENT
Start: 2023-04-12

## 2023-04-12 RX ORDER — PREDNISONE 10 MG/1
30 TABLET ORAL DAILY
Status: DISCONTINUED | OUTPATIENT
Start: 2023-04-13 | End: 2023-04-13 | Stop reason: HOSPADM

## 2023-04-12 RX ORDER — PANTOPRAZOLE SODIUM 40 MG/1
40 TABLET, DELAYED RELEASE ORAL
Status: DISCONTINUED | OUTPATIENT
Start: 2023-04-13 | End: 2023-04-13 | Stop reason: HOSPADM

## 2023-04-12 RX ORDER — IPRATROPIUM BROMIDE AND ALBUTEROL SULFATE 2.5; .5 MG/3ML; MG/3ML
3 SOLUTION RESPIRATORY (INHALATION) EVERY 6 HOURS PRN
Qty: 90 EACH | Refills: 5 | Status: SHIPPED | OUTPATIENT
Start: 2023-04-12

## 2023-04-12 RX ADMIN — APIXABAN 5 MG: 5 TABLET, FILM COATED ORAL at 20:05

## 2023-04-12 RX ADMIN — IPRATROPIUM BROMIDE AND ALBUTEROL SULFATE 1 AMPULE: .5; 2.5 SOLUTION RESPIRATORY (INHALATION) at 20:51

## 2023-04-12 RX ADMIN — CLONAZEPAM 0.5 MG: 0.5 TABLET ORAL at 20:05

## 2023-04-12 RX ADMIN — CLONAZEPAM 0.5 MG: 0.5 TABLET ORAL at 09:50

## 2023-04-12 RX ADMIN — EMPAGLIFLOZIN 10 MG: 10 TABLET, FILM COATED ORAL at 09:50

## 2023-04-12 RX ADMIN — APIXABAN 5 MG: 5 TABLET, FILM COATED ORAL at 09:50

## 2023-04-12 RX ADMIN — SODIUM CHLORIDE, PRESERVATIVE FREE 10 ML: 5 INJECTION INTRAVENOUS at 09:50

## 2023-04-12 RX ADMIN — BUDESONIDE 500 MCG: 0.5 SUSPENSION RESPIRATORY (INHALATION) at 20:51

## 2023-04-12 RX ADMIN — SODIUM CHLORIDE, PRESERVATIVE FREE 10 ML: 5 INJECTION INTRAVENOUS at 20:05

## 2023-04-12 RX ADMIN — ACYCLOVIR 200 MG: 200 CAPSULE ORAL at 09:50

## 2023-04-12 RX ADMIN — METHYLPREDNISOLONE SODIUM SUCCINATE 40 MG: 40 INJECTION, POWDER, FOR SOLUTION INTRAMUSCULAR; INTRAVENOUS at 12:11

## 2023-04-12 RX ADMIN — BUDESONIDE 500 MCG: 0.5 SUSPENSION RESPIRATORY (INHALATION) at 09:04

## 2023-04-12 RX ADMIN — IPRATROPIUM BROMIDE AND ALBUTEROL SULFATE 1 AMPULE: .5; 2.5 SOLUTION RESPIRATORY (INHALATION) at 16:52

## 2023-04-12 RX ADMIN — ARFORMOTEROL TARTRATE 15 MCG: 15 SOLUTION RESPIRATORY (INHALATION) at 09:04

## 2023-04-12 RX ADMIN — ARFORMOTEROL TARTRATE 15 MCG: 15 SOLUTION RESPIRATORY (INHALATION) at 20:51

## 2023-04-12 RX ADMIN — SACUBITRIL AND VALSARTAN 1 TABLET: 24; 26 TABLET, FILM COATED ORAL at 20:05

## 2023-04-12 RX ADMIN — IPRATROPIUM BROMIDE AND ALBUTEROL SULFATE 1 AMPULE: .5; 2.5 SOLUTION RESPIRATORY (INHALATION) at 09:05

## 2023-04-12 ASSESSMENT — PAIN SCALES - GENERAL
PAINLEVEL_OUTOF10: 0

## 2023-04-12 NOTE — CARE COORDINATION
Patient continues on 4L oxygen, spoke with him and spouse, patient tells me \"I am feeling much worse today, its not a good day. \" Wife tells me he has bad days sometimes. They both plan for home, discussed possible homecare, spouse declined today. Will need walking pulse ox on day of discharge. For questions I can be reached at [phone].  Martina Banerjee, [place]

## 2023-04-13 VITALS
BODY MASS INDEX: 26.03 KG/M2 | TEMPERATURE: 98.5 F | HEIGHT: 66 IN | WEIGHT: 162 LBS | DIASTOLIC BLOOD PRESSURE: 81 MMHG | OXYGEN SATURATION: 95 % | RESPIRATION RATE: 16 BRPM | HEART RATE: 75 BPM | SYSTOLIC BLOOD PRESSURE: 106 MMHG

## 2023-04-13 LAB
ANION GAP SERPL CALCULATED.3IONS-SCNC: 9 MMOL/L (ref 7–16)
BUN SERPL-MCNC: 27 MG/DL (ref 6–23)
CALCIUM SERPL-MCNC: 9.9 MG/DL (ref 8.6–10.2)
CHLORIDE SERPL-SCNC: 104 MMOL/L (ref 98–107)
CO2 SERPL-SCNC: 26 MMOL/L (ref 22–29)
CREAT SERPL-MCNC: 0.6 MG/DL (ref 0.7–1.2)
GLUCOSE SERPL-MCNC: 98 MG/DL (ref 74–99)
MAGNESIUM SERPL-MCNC: 2.5 MG/DL (ref 1.6–2.6)
POTASSIUM SERPL-SCNC: 4.4 MMOL/L (ref 3.5–5)
SODIUM SERPL-SCNC: 139 MMOL/L (ref 132–146)

## 2023-04-13 PROCEDURE — 6370000000 HC RX 637 (ALT 250 FOR IP): Performed by: INTERNAL MEDICINE

## 2023-04-13 PROCEDURE — 94640 AIRWAY INHALATION TREATMENT: CPT

## 2023-04-13 PROCEDURE — 97530 THERAPEUTIC ACTIVITIES: CPT

## 2023-04-13 PROCEDURE — 97161 PT EVAL LOW COMPLEX 20 MIN: CPT

## 2023-04-13 PROCEDURE — 36415 COLL VENOUS BLD VENIPUNCTURE: CPT

## 2023-04-13 PROCEDURE — 6370000000 HC RX 637 (ALT 250 FOR IP): Performed by: CLINICAL NURSE SPECIALIST

## 2023-04-13 PROCEDURE — 99232 SBSQ HOSP IP/OBS MODERATE 35: CPT | Performed by: INTERNAL MEDICINE

## 2023-04-13 PROCEDURE — 83735 ASSAY OF MAGNESIUM: CPT

## 2023-04-13 PROCEDURE — 80048 BASIC METABOLIC PNL TOTAL CA: CPT

## 2023-04-13 PROCEDURE — 2580000003 HC RX 258: Performed by: INTERNAL MEDICINE

## 2023-04-13 PROCEDURE — 2700000000 HC OXYGEN THERAPY PER DAY

## 2023-04-13 RX ADMIN — SPIRONOLACTONE 12.5 MG: 25 TABLET ORAL at 08:17

## 2023-04-13 RX ADMIN — PANTOPRAZOLE SODIUM 40 MG: 40 TABLET, DELAYED RELEASE ORAL at 05:30

## 2023-04-13 RX ADMIN — FUROSEMIDE 40 MG: 40 TABLET ORAL at 08:16

## 2023-04-13 RX ADMIN — EMPAGLIFLOZIN 10 MG: 10 TABLET, FILM COATED ORAL at 08:18

## 2023-04-13 RX ADMIN — APIXABAN 5 MG: 5 TABLET, FILM COATED ORAL at 08:18

## 2023-04-13 RX ADMIN — SACUBITRIL AND VALSARTAN 1 TABLET: 24; 26 TABLET, FILM COATED ORAL at 08:21

## 2023-04-13 RX ADMIN — ACYCLOVIR 200 MG: 200 CAPSULE ORAL at 08:17

## 2023-04-13 RX ADMIN — ARFORMOTEROL TARTRATE 15 MCG: 15 SOLUTION RESPIRATORY (INHALATION) at 08:53

## 2023-04-13 RX ADMIN — BUDESONIDE 500 MCG: 0.5 SUSPENSION RESPIRATORY (INHALATION) at 08:53

## 2023-04-13 RX ADMIN — IPRATROPIUM BROMIDE AND ALBUTEROL SULFATE 1 AMPULE: .5; 2.5 SOLUTION RESPIRATORY (INHALATION) at 08:53

## 2023-04-13 RX ADMIN — METOPROLOL SUCCINATE 25 MG: 25 TABLET, EXTENDED RELEASE ORAL at 11:44

## 2023-04-13 RX ADMIN — SODIUM CHLORIDE, PRESERVATIVE FREE 10 ML: 5 INJECTION INTRAVENOUS at 08:18

## 2023-04-13 RX ADMIN — IPRATROPIUM BROMIDE AND ALBUTEROL SULFATE 1 AMPULE: .5; 2.5 SOLUTION RESPIRATORY (INHALATION) at 11:58

## 2023-04-13 RX ADMIN — PREDNISONE 30 MG: 10 TABLET ORAL at 08:17

## 2023-04-13 ASSESSMENT — PAIN SCALES - GENERAL
PAINLEVEL_OUTOF10: 0

## 2023-04-13 NOTE — CONSULTS
Inpatient Cardiology Consultation      Reason for Consult:  CHF    Consulting Physician: Dr. Nolberto Alexander    Requesting Physician:  Dr. Kenneth Pope    Date of Consultation: 4/10/2023    HISTORY OF PRESENT ILLNESS:   Mr. Amaury Julio is a 68-year-old male known to [facility] cardiology and followed by Dr. Silvia Aparicio. He was seen most recently in an office visit 3/28/2023. During this visit Dr. Silvia Aparicio instructed to increase Entresto 24-26 to full pill. He also recommended a referral to the heart failure specialist at [address] [place] [address] however patient deferred. Patient follows with the CHF clinic was seen last 4/4/2023. Weight at that time 166 pounds -patient was felt to be euvolemic at this time and no diuretics were given. PMH: Nonischemic cardiomyopathy,  chronic bifascicular block including right bundle branch block and left anterior fascicular block, HFrEF LVEF 40%, paroxysmal atrial fibrillation OAC with Eliquis, VHD, asthma, prostate cancer, bipolar disorder    [facility] ED: 4/8/2023 0400 via EMS for SOB, productive cough, hypoxic at home  Arrival vitals: T98 RR 16 HR 69 /65 SPO2 89% on room air BMI 26.2  Significant Labs: proBNP 2076 >> 3113, troponin 21, BUN 15 CR 0.8>> BUN 33 CR 0.8 K4.8 mag 2.3, procalcitonin 0.08, albumin 4.1, WBC 7.0, hemoglobin 13.2  Full respiratory panel for  Human Rhinovirus/Enterovirus -droplet isolation  ABGs on 5 L nasal cannula oxygen date of admission: pH 7.427 PCO2 41.8 PO2 46.5 bicarb 26.9  RAD: Chest x-ray no acute process    ED treatment: Solu-Medrol, DuoNeb, Lasix 40 mg oral, Vibramycin, Rocephin, Symbicort, Pulmicort, and home medications    Pulmonology on consult. For COPD exacerbation    Patient seen and examined. Recent vitals: T97.6 RR 20 HR 52-67 BP 99/51 to 101/44 SPO2 100% on 8 L nasal cannula high flow. Intake and output incomplete thus far  Patient is a limited historian and difficult to get information out of today.   He tells me that he had shortness of
keeping physician follow ups, smoking cessation, follow the Heart Failure Zones  The Heart Failure zones  Every Dose Every Day      Instructed  to call 911 if you have any of the following symptoms:       Struggling to breathe unrelieved with rest,     Having chest pain     Have confusion or cant think clearly          Alma Lee, RN BSN, RN  Heart Failure [address] [facility],[address] [facility] (CHF) AHA GUIDELINES  (Must be completed for Primary Diagnosis CHF or History of CHF)    Discharge Plan:  I placed the Heart Failure Home Instructions in patient's discharge instructions. Per Heart Failure [facility], the patient should have a follow-up appointment made within 7 days of discharge.     New Diagnosis No    ECHO Results most recent:  Lab Results   Component Value Date    LVEF 40 03/28/2023                                        Social History     Tobacco Use   Smoking Status Never   Smokeless Tobacco Never        Immunization History   Administered Date(s) Administered    COVID-19, PFIZER GRAY top, DO NOT Dilute, (age 15 y+), IM, 30 mcg/0.3 mL 04/15/2022    COVID-19, PFIZER PURPLE top, DILUTE for use, (age 15 y+), 30mcg/0.3mL 01/30/2021, 02/20/2021, 10/20/2021    Influenza A (O2C3-42) Vaccine PF IM 01/21/2010    Influenza A (E0e6-95),all Formulations 01/21/2010    Influenza Virus Vaccine 10/28/2008, 12/31/2009, 09/30/2010, 11/23/2011, 10/26/2012, 12/10/2013, 09/20/2014, 11/01/2015    Influenza, FLUAD, (age 72 y+), Adjuvanted, 0.5mL 10/20/2021    Influenza, FLUZONE (age 72 y+), High Dose, 0.7mL 09/22/2020    Influenza, High Dose (Fluzone 65 yrs and older) 11/21/2016, 10/18/2017    Influenza, Triv, inactivated, subunit, adjuvanted, IM (Fluad 65 yrs and older) 09/24/2018, 11/01/2019    Pneumococcal Vaccine 01/25/2013    Pneumococcal, PCV-13, PREVNAR 13, (age 6w+), IM, 0.5mL 11/21/2016, 01/26/2018    Pneumococcal, PPSV23, PNEUMOVAX 21, (age 2y+), SC/IM, 0.5mL 02/22/2016    Td vaccine (adult) 04/01/2005    Zoster

## 2023-04-13 NOTE — PROGRESS NOTES
[facility]  [address] [facility], [address], 17 [facility]      Pulmonary Progress Note      SUBJECTIVE:  feeling similar with respiratory function, on 4 li NC saturating 94-96%, not much cough/congestion, ambulating some in room (?) close to baseline    OBJECTIVE    Medications    Continuous Infusions:   sodium chloride         Scheduled Meds:   empagliflozin  10 mg Oral Daily    methylPREDNISolone  40 mg IntraVENous Q8H    furosemide  40 mg Oral Daily    acyclovir  200 mg Oral Daily    apixaban  5 mg Oral BID    metoprolol succinate  25 mg Oral Daily    sacubitril-valsartan  1 tablet Oral BID    spironolactone  12.5 mg Oral Daily    sodium chloride flush  5-40 mL IntraVENous 2 times per day    budesonide  0.5 mg Nebulization BID    And    arformoterol tartrate  15 mcg Nebulization BID    ipratropium-albuterol  1 ampule Inhalation 4x daily       PRN Meds:diphenhydrAMINE, clonazePAM, sodium chloride flush, sodium chloride, ondansetron **OR** ondansetron, polyethylene glycol, acetaminophen **OR** acetaminophen, ipratropium-albuterol    Physical    VITALS:  BP (!) 103/55   Pulse 75   Temp 97.8 °F (36.6 °C) (Oral)   Resp 18   Ht 5' 6\" (1.676 m)   Wt 161 lb 11.2 oz (73.3 kg)   SpO2 97%   BMI 26.10 kg/m²     24HR INTAKE/OUTPUT:      Intake/Output Summary (Last 24 hours) at 2023 1550  Last data filed at 2023 1427  Gross per 24 hour   Intake 540 ml   Output 310 ml   Net 230 ml       24HR PULSE OXIMETRY RANGE:    SpO2  Av %  Min: 92 %  Max: 97 %    General appearance: alert, appears stated age, and cooperative  Lungs: rhonchi bibasilar with cough  Heart: regular rate and rhythm, S1, S2 normal, no murmur, click, rub or gallop  Abdomen: soft, non-tender; bowel sounds normal; no masses,  no organomegaly  Extremities: extremities normal, atraumatic, no cyanosis or edema  Neurologic: Mental status: Alert, oriented, thought content
[facility] [address] [facility]  [address] [facility], [address], 17 [facility]      Pulmonary Progress Note      SUBJECTIVE:  feeling similar with respiratory function, on 5 li NC saturating 99%, not much cough/congestion, minimal ambulation in room, isolated due to (+) rhinovirus    OBJECTIVE    Medications    Continuous Infusions:   sodium chloride         Scheduled Meds:   furosemide  40 mg IntraVENous BID    acyclovir  200 mg Oral Daily    apixaban  5 mg Oral BID    metoprolol succinate  25 mg Oral Daily    sacubitril-valsartan  1 tablet Oral BID    spironolactone  12.5 mg Oral Daily    sodium chloride flush  5-40 mL IntraVENous 2 times per day    methylPREDNISolone  40 mg IntraVENous Q6H    budesonide  0.5 mg Nebulization BID    And    arformoterol tartrate  15 mcg Nebulization BID    ipratropium-albuterol  1 ampule Inhalation 4x daily       PRN Meds:diphenhydrAMINE, clonazePAM, sodium chloride flush, sodium chloride, ondansetron **OR** ondansetron, polyethylene glycol, acetaminophen **OR** acetaminophen, ipratropium-albuterol    Physical    VITALS:  BP (!) 102/46   Pulse 69   Temp 98 °F (36.7 °C) (Temporal)   Resp 20   Ht 5' 6\" (1.676 m)   Wt 161 lb 11.2 oz (73.3 kg)   SpO2 99%   BMI 26.10 kg/m²     24HR INTAKE/OUTPUT:    No intake or output data in the 24 hours ending 04/10/23 1543    24HR PULSE OXIMETRY RANGE:    SpO2  Av.5 %  Min: 96 %  Max: 100 %    General appearance: alert, appears stated age, and cooperative  Lungs: rhonchi bibasilar with cough  Heart: regular rate and rhythm, S1, S2 normal, no murmur, click, rub or gallop  Abdomen: soft, non-tender; bowel sounds normal; no masses,  no organomegaly  Extremities: extremities normal, atraumatic, no cyanosis or edema  Neurologic: Mental status: Alert, oriented, thought content appropriate    Data    CBC:   Recent Labs     23  0414 23  0449   WBC 9.6 7.0   HGB 14.6 13.2   HCT 45.7 41.8
[facility] [address] [facility]  [address], [address]  [facility], 17 [facility]      Pulmonary Progress Note      SUBJECTIVE:  feeling similar with respiratory function, on 4 li NC saturating 94-96%, not much cough/congestion, ambulating some in room (?) close to baseline. Became hypoxic earlier today when brushing his teeth on RA and desaturated to 70's when checked.     OBJECTIVE    Medications    Continuous Infusions:   sodium chloride         Scheduled Meds:   [START ON 2023] pantoprazole  40 mg Oral QAM AC    empagliflozin  10 mg Oral Daily    methylPREDNISolone  40 mg IntraVENous Q12H    furosemide  40 mg Oral Daily    acyclovir  200 mg Oral Daily    apixaban  5 mg Oral BID    metoprolol succinate  25 mg Oral Daily    sacubitril-valsartan  1 tablet Oral BID    spironolactone  12.5 mg Oral Daily    sodium chloride flush  5-40 mL IntraVENous 2 times per day    budesonide  0.5 mg Nebulization BID    And    arformoterol tartrate  15 mcg Nebulization BID    ipratropium-albuterol  1 ampule Inhalation 4x daily       PRN Meds:calcium carbonate, diphenhydrAMINE, clonazePAM, sodium chloride flush, sodium chloride, ondansetron **OR** ondansetron, polyethylene glycol, acetaminophen **OR** acetaminophen, ipratropium-albuterol    Physical    VITALS:  BP (!) 100/45   Pulse (!) 43   Temp 98.3 °F (36.8 °C) (Oral)   Resp 22   Ht 5' 6\" (1.676 m)   Wt 162 lb (73.5 kg)   SpO2 100%   BMI 26.15 kg/m²     24HR INTAKE/OUTPUT:      Intake/Output Summary (Last 24 hours) at 2023 1646  Last data filed at 2023 1736  Gross per 24 hour   Intake 180 ml   Output --   Net 180 ml         24HR PULSE OXIMETRY RANGE:    SpO2  Av %  Min: 92 %  Max: 100 %    General appearance: alert, appears stated age, and cooperative  Lungs: rhonchi bibasilar with cough  Heart: regular rate and rhythm, S1, S2 normal, no murmur, click, rub or gallop  Abdomen: soft, non-tender; bowel sounds
[facility] Medicine    Subjective:  pt alert conversive wife at bedside      Current Facility-Administered Medications:     pantoprazole (PROTONIX) tablet 40 mg, 40 mg, Oral, QAM AC, Valerie Jenkins DO, 40 mg at 04/13/23 0530    calcium carbonate (TUMS) chewable tablet 500 mg, 500 mg, Oral, TID PRN, Valerie Jenkins DO    predniSONE (DELTASONE) tablet 30 mg, 30 mg, Oral, Daily, Nasim Matthew MD    empagliflozin (JARDIANCE) tablet 10 mg, 10 mg, Oral, Daily, Scotty Reddy MD, 10 mg at 04/12/23 0950    furosemide (LASIX) tablet 40 mg, 40 mg, Oral, Daily, Scotty Reddy MD, 40 mg at 04/11/23 0835    acyclovir (ZOVIRAX) capsule 200 mg, 200 mg, Oral, Daily, Valerie Jenkins DO, 200 mg at 04/12/23 0950    diphenhydrAMINE (BENADRYL) tablet 25 mg, 25 mg, Oral, Nightly PRN, Valerie Jenkins DO, 25 mg at 04/09/23 2010    apixaban (ELIQUIS) tablet 5 mg, 5 mg, Oral, BID, Valerie Jenkins DO, 5 mg at 04/12/23 2005    clonazePAM (KLONOPIN) tablet 0.5 mg, 0.5 mg, Oral, TID PRN, Valerie Jenkins DO, 0.5 mg at 04/12/23 2005    metoprolol succinate (TOPROL XL) extended release tablet 25 mg, 25 mg, Oral, Daily, EMANUEL Baird CNS, 25 mg at 04/11/23 1200    sacubitril-valsartan (ENTRESTO) 24-26 MG per tablet 1 tablet, 1 tablet, Oral, BID, EMANUEL Sanchez, 1 tablet at 04/12/23 2005    spironolactone (ALDACTONE) tablet 12.5 mg, 12.5 mg, Oral, Daily, Valerie Jenkins DO, 12.5 mg at 04/11/23 0836    sodium chloride flush 0.9 % injection 5-40 mL, 5-40 mL, IntraVENous, 2 times per day, Mariela Trejo DO, 10 mL at 04/12/23 2005    sodium chloride flush 0.9 % injection 5-40 mL, 5-40 mL, IntraVENous, PRN, Valerie Jenkins DO, 10 mL at 04/11/23 0336    0.9 % sodium chloride infusion, , IntraVENous, PRN, Valerie Jenkins DO    ondansetron (ZOFRAN-ODT) disintegrating tablet 4 mg, 4 mg, Oral, Q8H PRN **OR** ondansetron (ZOFRAN) injection 4 mg, 4 mg, IntraVENous, Q6H PRN, Valerie Jenkins DO    polyethylene
[facility] Medicine    Subjective:  pt alert conversive c/o chest congestion      Current Facility-Administered Medications:     methylPREDNISolone sodium (SOLU-MEDROL) injection 40 mg, 40 mg, IntraVENous, Q8H, Nasim Matthew MD, 40 mg at 04/11/23 0336    furosemide (LASIX) tablet 40 mg, 40 mg, Oral, Daily, Scotty Reddy MD    acyclovir (ZOVIRAX) capsule 200 mg, 200 mg, Oral, Daily, Valerie Jenkins DO, 200 mg at 04/10/23 1142    diphenhydrAMINE (BENADRYL) tablet 25 mg, 25 mg, Oral, Nightly PRN, Valerie Jenkins DO, 25 mg at 04/09/23 2010    apixaban (ELIQUIS) tablet 5 mg, 5 mg, Oral, BID, Valerie Jenkins DO, 5 mg at 04/10/23 2020    clonazePAM (KLONOPIN) tablet 0.5 mg, 0.5 mg, Oral, TID PRN, Valerie Jenkins DO, 0.5 mg at 04/10/23 2020    metoprolol succinate (TOPROL XL) extended release tablet 25 mg, 25 mg, Oral, Daily, Nesha Umanzor, APRN - CNS, 25 mg at 04/09/23 1039    sacubitril-valsartan (ENTRESTO) 24-26 MG per tablet 1 tablet, 1 tablet, Oral, BID, Paola Umanzor, APRN - CNS, 1 tablet at 04/10/23 2020    spironolactone (ALDACTONE) tablet 12.5 mg, 12.5 mg, Oral, Daily, Valerie Jenkins DO, 12.5 mg at 04/09/23 1039    sodium chloride flush 0.9 % injection 5-40 mL, 5-40 mL, IntraVENous, 2 times per day, Mariela Trejo DO, 10 mL at 04/10/23 2020    sodium chloride flush 0.9 % injection 5-40 mL, 5-40 mL, IntraVENous, PRN, Valerie Jenkins DO, 10 mL at 04/11/23 0336    0.9 % sodium chloride infusion, , IntraVENous, PRN, Valerie Jenkins DO    ondansetron (ZOFRAN-ODT) disintegrating tablet 4 mg, 4 mg, Oral, Q8H PRN **OR** ondansetron (ZOFRAN) injection 4 mg, 4 mg, IntraVENous, Q6H PRN, Valerie Jenkins,     polyethylene glycol (GLYCOLAX) packet 17 g, 17 g, Oral, Daily PRN, Valerie Jenkins,     acetaminophen (TYLENOL) tablet 650 mg, 650 mg, Oral, Q6H PRN, 650 mg at 04/08/23 1023 **OR** acetaminophen (TYLENOL) suppository 650 mg, 650 mg, Rectal, Q6H PRN, Valerie Jenkins,     budesonide
Date: 2023       Patient Name: Zoltan Knight  : 1945      MRN: [id number]    Pt declined PT due to feeling unwell, with reports of moderate abdomen pain. Educated on benefits of mobilization. RN aware.   Will follow up as able    Jami Brown PT, DPT  [id number]
Hospital Medicine    Subjective:  pt alert conversive breathing better      Current Facility-Administered Medications:     acyclovir (ZOVIRAX) capsule 200 mg, 200 mg, Oral, Daily, Mikayla Jenkins, DO    diphenhydrAMINE (BENADRYL) tablet 25 mg, 25 mg, Oral, Nightly PRN, Mikayla Jenkins, DO, 25 mg at 04/09/23 2010    apixaban (ELIQUIS) tablet 5 mg, 5 mg, Oral, BID, Mikayla Jenkins, DO, 5 mg at 04/09/23 2010    clonazePAM (KLONOPIN) tablet 0.5 mg, 0.5 mg, Oral, TID PRN, Mikayla Jenkins DO, 0.5 mg at 04/08/23 2021    furosemide (LASIX) tablet 40 mg, 40 mg, Oral, Daily, Sandra Cornejo, DO, 40 mg at 04/09/23 1000    metoprolol succinate (TOPROL XL) extended release tablet 25 mg, 25 mg, Oral, Daily, Mikayla Jenkins, DO, 25 mg at 04/09/23 1039    sacubitril-valsartan (ENTRESTO) 24-26 MG per tablet 1 tablet, 1 tablet, Oral, BID, Sandra Cornejo DO, 1 tablet at 04/09/23 2010    spironolactone (ALDACTONE) tablet 12.5 mg, 12.5 mg, Oral, Daily, Mikayla Jenkins, DO, 12.5 mg at 04/09/23 1039    sodium chloride flush 0.9 % injection 5-40 mL, 5-40 mL, IntraVENous, 2 times per day, Sandra Cornejo DO, 10 mL at 04/09/23 1038    sodium chloride flush 0.9 % injection 5-40 mL, 5-40 mL, IntraVENous, PRN, Mikayla Jenkins DO    0.9 % sodium chloride infusion, , IntraVENous, PRN, Mikayla Jenkins,     ondansetron (ZOFRAN-ODT) disintegrating tablet 4 mg, 4 mg, Oral, Q8H PRN **OR** ondansetron (ZOFRAN) injection 4 mg, 4 mg, IntraVENous, Q6H PRN, Mikayla Jenkins, DO    polyethylene glycol (GLYCOLAX) packet 17 g, 17 g, Oral, Daily PRN, Mikayla Jenkins,     acetaminophen (TYLENOL) tablet 650 mg, 650 mg, Oral, Q6H PRN, 650 mg at 04/08/23 1023 **OR** acetaminophen (TYLENOL) suppository 650 mg, 650 mg, Rectal, Q6H PRN, Mikayla Jenkins DO    methylPREDNISolone sodium (SOLU-MEDROL) injection 40 mg, 40 mg, IntraVENous, Q6H, Mikayla Jenkins DO, 40 mg at 04/10/23 0535    budesonide (PULMICORT) nebulizer suspension 500 mcg, 0.5
Hospital Medicine    Subjective:  pt seen this am alert conversive sitting up in bed      Current Facility-Administered Medications:     [START ON 4/13/2023] pantoprazole (PROTONIX) tablet 40 mg, 40 mg, Oral, QAM AC, Ronal Jenkins DO    calcium carbonate (TUMS) chewable tablet 500 mg, 500 mg, Oral, TID PRN, Ronal Jenkins, DO    empagliflozin (JARDIANCE) tablet 10 mg, 10 mg, Oral, Daily, Mechele Schwab, MD, 10 mg at 04/12/23 0950    methylPREDNISolone sodium (SOLU-MEDROL) injection 40 mg, 40 mg, IntraVENous, Q12H, Mine Kaiser MD, 40 mg at 04/12/23 1211    furosemide (LASIX) tablet 40 mg, 40 mg, Oral, Daily, Mechele Schwab, MD, 40 mg at 04/11/23 0835    acyclovir (ZOVIRAX) capsule 200 mg, 200 mg, Oral, Daily, Ronal Jenkins DO, 200 mg at 04/12/23 0950    diphenhydrAMINE (BENADRYL) tablet 25 mg, 25 mg, Oral, Nightly PRN, Ronal Jenkins, , 25 mg at 04/09/23 2010    apixaban (ELIQUIS) tablet 5 mg, 5 mg, Oral, BID, Ronal Jenkins DO, 5 mg at 04/12/23 0950    clonazePAM (KLONOPIN) tablet 0.5 mg, 0.5 mg, Oral, TID PRN, Ronal Jenkins DO, 0.5 mg at 04/12/23 0950    metoprolol succinate (TOPROL XL) extended release tablet 25 mg, 25 mg, Oral, Daily, EMANUEL Baird - CNS, 25 mg at 04/11/23 1200    sacubitril-valsartan (ENTRESTO) 24-26 MG per tablet 1 tablet, 1 tablet, Oral, BID, EMANUEL Baird - CNS, 1 tablet at 04/11/23 2008    spironolactone (ALDACTONE) tablet 12.5 mg, 12.5 mg, Oral, Daily, Ronal Jenkins DO, 12.5 mg at 04/11/23 0836    sodium chloride flush 0.9 % injection 5-40 mL, 5-40 mL, IntraVENous, 2 times per day, Ronal Giulia Zamanmer, , 10 mL at 04/12/23 0950    sodium chloride flush 0.9 % injection 5-40 mL, 5-40 mL, IntraVENous, PRN, Ronal Zamanmer, , 10 mL at 04/11/23 0336    0.9 % sodium chloride infusion, , IntraVENous, PRN, Ronal Jenkins,     ondansetron (ZOFRAN-ODT) disintegrating tablet 4 mg, 4 mg, Oral, Q8H PRN **OR** ondansetron (ZOFRAN) injection 4 mg, 4 mg,
INPATIENT CARDIOLOGY FOLLOW-UP    Name: Maicol Ramey    Age: 68 y.o. Date of Admission: 4/8/2023  4:11 AM    Date of Service: 4/11/2023    Primary Cardiologist: Dr. Celio Dias    Chief Complaint: Follow-up for acute hypoxemic respiratory failure    Interim History:  No new overnight cardiac complaints. Currently with no complaints of CP, SOB, palpitations, dizziness, or lightheadedness. SR on telemetry. Still on 4 L via nasal cannula. Feeling a lot better overall.     Review of Systems:   Negative except as described above    Problem List:  Patient Active Problem List   Diagnosis    Depression with anxiety    Moderate asthma without complication    Asthma exacerbation    Congestive heart failure (HCC)    PAF (paroxysmal atrial fibrillation) ([facility])    Chronic systolic (congestive) heart failure    Asthma in adult, moderate persistent, with acute exacerbation    NICM (nonischemic cardiomyopathy) ([facility] [address]    Hypoxia    Acute hypoxemic respiratory failure (HCC)    VHD (valvular heart disease)       Current Medications:    Current Facility-Administered Medications:     methylPREDNISolone sodium (SOLU-MEDROL) injection 40 mg, 40 mg, IntraVENous, Q8H, Emily Allen MD, 40 mg at 04/11/23 0336    furosemide (LASIX) tablet 40 mg, 40 mg, Oral, Daily, Keily Olivia MD, 40 mg at 04/11/23 0835    acyclovir (ZOVIRAX) capsule 200 mg, 200 mg, Oral, Daily, Nacho Jenkins DO, 200 mg at 04/11/23 0549    diphenhydrAMINE (BENADRYL) tablet 25 mg, 25 mg, Oral, Nightly PRN, Nacho Jenkins DO, 25 mg at 04/09/23 2010    apixaban (ELIQUIS) tablet 5 mg, 5 mg, Oral, BID, Nacho Jenkins DO, 5 mg at 04/11/23 5962    clonazePAM (KLONOPIN) tablet 0.5 mg, 0.5 mg, Oral, TID PRN, Nacho Jenkins DO, 0.5 mg at 04/10/23 2020    metoprolol succinate (TOPROL XL) extended release tablet 25 mg, 25 mg, Oral, Daily, Nesha Umanzor, APRN - CNS, 25 mg at 04/09/23 1039    sacubitril-valsartan (ENTRESTO) 24-26 MG per tablet 1 tablet, 1
INPATIENT CARDIOLOGY FOLLOW-UP    Name: Remi Trinidad    Age: 68 y.o. Date of Admission: 4/8/2023  4:11 AM    Date of Service: 4/13/2023    Primary Cardiologist: Dr. Cathie Jain    Chief Complaint: Follow-up for acute hypoxemic respiratory failure    Interim History:  No new overnight cardiac complaints. Currently with no complaints of CP, SOB, palpitations, dizziness, or lightheadedness. SR on telemetry. Still on 4 L via nasal cannula. Doing a lot better today. More awake. Pressures have been better.     Review of Systems:   Negative except as described above    Problem List:  Patient Active Problem List   Diagnosis    Depression with anxiety    Moderate asthma without complication    Asthma exacerbation    Congestive heart failure (HCC)    PAF (paroxysmal atrial fibrillation) ([facility])    Chronic systolic (congestive) heart failure    Asthma in adult, moderate persistent, with acute exacerbation    NICM (nonischemic cardiomyopathy) ([facility] [address]    Hypoxia    Acute hypoxemic respiratory failure ([facility])    VHD (valvular heart disease)       Current Medications:    Current Facility-Administered Medications:     pantoprazole (PROTONIX) tablet 40 mg, 40 mg, Oral, QAM AC, Angela Jenkins DO, 40 mg at 04/13/23 0530    calcium carbonate (TUMS) chewable tablet 500 mg, 500 mg, Oral, TID PRN, Angela Jenkins DO    predniSONE (DELTASONE) tablet 30 mg, 30 mg, Oral, Daily, Alexandre Canada MD, 30 mg at 04/13/23 0817    empagliflozin (JARDIANCE) tablet 10 mg, 10 mg, Oral, Daily, Dmitry Mcgee MD, 10 mg at 04/13/23 0818    furosemide (LASIX) tablet 40 mg, 40 mg, Oral, Daily, Dmitry Mcgee MD, 40 mg at 04/13/23 0816    acyclovir (ZOVIRAX) capsule 200 mg, 200 mg, Oral, Daily, Angela Jenkins DO, 200 mg at 04/13/23 4954    diphenhydrAMINE (BENADRYL) tablet 25 mg, 25 mg, Oral, Nightly PRN, Angela Jenkins DO, 25 mg at 04/09/23 2010    apixaban (ELIQUIS) tablet 5 mg, 5 mg, Oral, BID, Angela Jenkins DO, 5 mg at 04/13/23 1503
Occupational Therapy  Attempted OT eval at b/s. Pt found fully dressed in bed awaiting arrival of Home O2 and subsequent discharge. Provided pt/family ed re: Good use of [employer], Benefits of use of DME/AD/Adaptive equipment for [employer]. Pt reported having seats in [employer] - suggested Hand-held Shower Spray for improved breathing w/ bathing. Pt denied having any equipment needs and declined to participate in session any further.   Will discontinue OT order - thank you for this referral.  Elena Timmons, FORTUNATO, OTR/L  # [id number]
PULSE OX ON 3L SITTING - 96%  PULSE OX ON _3L_ LITERS AMBULATING 88%  PULSE OX ON _3L_LITERS SITTING RECOVERY 93__%      PULSE OX ON 4L SITTING __98_%   PULSE OX ON 4L AMBULATING __90_%   PULSE OX ON _4L_ LITERS RECOVERY __96_%
PULSE OX ON 4 LITERS SITTING 97 %  PULSE OX ON 4 LITERS AMBULATING 89%
Recommendations:     [x] Strengthening to improve independence with functional mobility   [x] ROM to improve independence with functional mobility   [x] Balance Training to improve static/dynamic balance and to reduce fall risk  [x] Endurance Training to improve activity tolerance during functional mobility   [x] Transfer Training to improve safety and independence with all functional transfers   [x] Gait Training to improve gait mechanics, endurance and assess need for appropriate assistive device  [x] Stair Training in preparation for safe discharge home and/or into the community   [x] Positioning to prevent skin breakdown and contractures  [x] Safety and Education Training   [x] Patient/Caregiver Education   [x] HEP  [] Other     PT long term treatment goals are located in above grid    Frequency of treatments: 2-5x/week x 1-2 weeks. Time in  1042  Time out  1102    Total Treatment Time  8 minutes     Evaluation Time includes thorough review of current medical information, gathering information on past medical history/social history and prior level of function, completion of standardized testing/informal observation of tasks, assessment of data and education on plan of care and goals.     CPT codes:  [x] Low Complexity PT evaluation 95937  [] Moderate Complexity PT evaluation 38459  [] High Complexity PT evaluation 50897  [] PT Re-evaluation 69930  [] Gait training 86775 0 minutes  [] Manual therapy 57929 0 minutes  [x] Therapeutic activities 50240 8 minutes  [] Therapeutic exercises 76251 0 minutes  [] Neuromuscular reeducation 74623 0 minutes       Anthony Fox PT, DPT   [id number]
Mild tricuspid regurgitation. RVSP is 46 mmHg. Pulmonary hypertension is mild . No evidence for hemodynamically significant pericardial effusion. MPS 12/30/2022: Abnormal with a small sized moderate intensity fixed perfusion defect of the apex. Minimal induced ischemia noted. Left ventricular systolic function abnormal without regional wall motion abnormalities. Intermediate risk stress test given severe LV dysfunction. LVEF calculated 26%. Assessment:   Nonischemic cardiomyopathy felt to be tachycardia induced; HFrEF, acute on chronic, LVEF 35 to 40% 12/22 DANILO  GDMT: Toprol XL, Entresto, Aldactone  NYHA Class III / ACC stage C  Acute hypoxic respiratory failure requiring high flow oxygen/asthma/positive human Rhinovirus/Enterovirus respiratory panel -treatment per PCP and pulmonology  Paroxysmal atrial fibrillation. [id number] score - 3; [address] with Eliquis. Atrial fibrillation RVR on admission. Currently in normal sinus rhythm with PVC  Chronic bifascicular block (RBBB and LAFB)  VHD mild to moderate MR, moderate AR, mild TR/mild pulmonary hypertension per TTE 12/2022  Bipolar disorder        Plan:   The patient presents with acute hypoxemic respiratory failure. I believe this is primarily pulmonary. Overall, he appears euvolemic on examination. Continue current cardiac medications. Pressures are borderline today. Will need to stagger Entresto, Aldactone and Toprol-XL. Holding parameters-hold for systolic pressures less than 90 and heart rate in terms of Toprol-XL less than 60. His pressures are currently borderline. Parameters given for cardiac medications, monitor blood pressure. Continue Eliquis for anticoagulation. Continue Lasix 40 mg daily. Appears to have nonischemic cardiomyopathy with no improvement in left ventricular systolic function. Should be considered for cardiac MRI down the line.   He should also be referred to electrophysiology as an outpatient for

## 2023-04-13 NOTE — CARE COORDINATION
Referral to [facility], they will deliver oxygen tank to the room today for discharge. Notified patient and spouse to call as soon as they are home for delivery of concentrator. Faxed orders to [facility]. Spouse here to transport home. For questions I can be reached at [phone].  [place], [place]

## 2023-04-14 ENCOUNTER — CARE COORDINATION (OUTPATIENT)
Dept: CASE MANAGEMENT | Age: 78
End: 2023-04-14

## 2023-04-14 DIAGNOSIS — R09.02 HYPOXIA: Primary | ICD-10-CM

## 2023-04-14 LAB
BACTERIA SPEC RESP CULT: ABNORMAL
ORGANISM: ABNORMAL
ORGANISM: ABNORMAL
SMEAR, RESPIRATORY: ABNORMAL

## 2023-04-14 PROCEDURE — 1111F DSCHRG MED/CURRENT MED MERGE: CPT | Performed by: FAMILY MEDICINE

## 2023-04-14 RX ORDER — ACETAMINOPHEN 160 MG
1 TABLET,DISINTEGRATING ORAL DAILY
COMMUNITY

## 2023-04-17 ENCOUNTER — CARE COORDINATION (OUTPATIENT)
Dept: CARE COORDINATION | Age: 78
End: 2023-04-17

## 2023-04-17 NOTE — CARE COORDINATION
Remote Patient Kit Ordering Note      Date/Time:  4/17/2023 8:18 AM      [x] [facility] confirmed patient shipping address  [x] Patient will receive package over the next 2-4 business days. Someone 21 years or older must be present to sign for UPS delivery. [x] Patient to contact virtual installation-specific phone number listed in the patient instructions. [x] If the patient does not contact HRS within 24 hours, an [employer] [facility] will call the patient directly: If the patient does not answer, HRS will follow up with the clinical team notifying them about the unsuccessful attempt to contact the patient. HRS will make three call attempts to the patient. [x] LPN will contact patient once equipment is active to welcome them to the program.                                                         [x] Hours of RPM monitoring - Monday-Friday 3433-2538                     All questions answered at this time. ACM made aware the RPM kit has been ordered. [facility] notified patient of RPM equipment order.

## 2023-04-18 ENCOUNTER — CARE COORDINATION (OUTPATIENT)
Dept: CARE COORDINATION | Age: 78
End: 2023-04-18

## 2023-04-18 NOTE — CARE COORDINATION
Remote Patient Monitoring Note  Date/Time:  4/18/2023 3:39 PM  Patient Current Location: [place]: [place] [address]  LPN noted red alert (when setting up equipment) received for pulse ox heart rate (124). Background: High Blood-Pressure, CHF, Asthma  Clinical Interventions: Reviewed and followed up on alerts and treatments-Upon chart review noted when setting up equipment Pulse Ox . Pt BP/HR 84 and 89. Plan/Follow Up: Will continue to review, monitor and address alerts with follow up based on severity of symptoms and risk factors.

## 2023-04-19 ENCOUNTER — CARE COORDINATION (OUTPATIENT)
Dept: CARE COORDINATION | Age: 78
End: 2023-04-19

## 2023-04-19 ENCOUNTER — HOSPITAL ENCOUNTER (OUTPATIENT)
Dept: OTHER | Age: 78
Setting detail: THERAPIES SERIES
Discharge: HOME | End: 2023-04-19
Payer: MEDICARE

## 2023-04-19 VITALS
BODY MASS INDEX: 25.18 KG/M2 | WEIGHT: 156 LBS | SYSTOLIC BLOOD PRESSURE: 90 MMHG | OXYGEN SATURATION: 100 % | RESPIRATION RATE: 18 BRPM | DIASTOLIC BLOOD PRESSURE: 54 MMHG | HEART RATE: 48 BPM

## 2023-04-19 LAB
ANION GAP SERPL CALCULATED.3IONS-SCNC: 12 MMOL/L (ref 7–16)
BNP BLD-MCNC: 237 PG/ML (ref 0–450)
BUN SERPL-MCNC: 29 MG/DL (ref 6–23)
CALCIUM SERPL-MCNC: 10 MG/DL (ref 8.6–10.2)
CHLORIDE SERPL-SCNC: 99 MMOL/L (ref 98–107)
CO2 SERPL-SCNC: 25 MMOL/L (ref 22–29)
CREAT SERPL-MCNC: 0.9 MG/DL (ref 0.7–1.2)
GLUCOSE SERPL-MCNC: 133 MG/DL (ref 74–99)
POTASSIUM SERPL-SCNC: 4.1 MMOL/L (ref 3.5–5)
SODIUM SERPL-SCNC: 136 MMOL/L (ref 132–146)

## 2023-04-19 PROCEDURE — 99214 OFFICE O/P EST MOD 30 MIN: CPT

## 2023-04-19 PROCEDURE — 83880 ASSAY OF NATRIURETIC PEPTIDE: CPT

## 2023-04-19 PROCEDURE — 80048 BASIC METABOLIC PNL TOTAL CA: CPT

## 2023-04-19 RX ORDER — METOPROLOL SUCCINATE 25 MG/1
12.5 TABLET, EXTENDED RELEASE ORAL DAILY
COMMUNITY

## 2023-04-19 NOTE — PLAN OF CARE
Problem: Chronic Conditions and Co-morbidities  Goal: Patient's chronic conditions and co-morbidity symptoms are monitored and maintained or improved  Flowsheets (Taken 4/19/2023 0526)  Care Plan - Patient's Chronic Conditions and Co-Morbidity Symptoms are Monitored and Maintained or Improved: Monitor and assess patient's chronic conditions and comorbid symptoms for stability, deterioration, or improvement

## 2023-04-19 NOTE — PROGRESS NOTES
DO [address] [facility]     Given clinic phone number and aware of signs and symptoms to call with any HF change in symptoms.

## 2023-04-20 ENCOUNTER — CARE COORDINATION (OUTPATIENT)
Dept: CARE COORDINATION | Age: 78
End: 2023-04-20

## 2023-04-20 NOTE — CARE COORDINATION
Remote Patient Monitoring Note  Date/Time:  2023 1:20 PM  Patient Current Location: [place] [address]  LPN contacted patient by telephone regarding red alert received for blood pressure heart rate (40). Verified patients name and  as identifiers. Background: High Blood-Pressure, CHF, Asthma  Clinical Interventions: Reviewed and followed up on alerts and treatments-Spoke with Pts spouse Giulia (verified HIPAA in chart) she states Pt asymptomatic, denies lightheadedness, dizziness, SOB, CP fatigue. Cardiologist decreased Toprol to 12.5 mg. Spouse aware. Pulse Ox HR 52.     Plan/Follow Up: Will continue to review, monitor and address alerts with follow up based on severity of symptoms and risk factors.

## 2023-04-21 ENCOUNTER — CARE COORDINATION (OUTPATIENT)
Dept: CASE MANAGEMENT | Age: 78
End: 2023-04-21

## 2023-04-21 ENCOUNTER — CARE COORDINATION (OUTPATIENT)
Dept: CARE COORDINATION | Age: 78
End: 2023-04-21

## 2023-04-21 NOTE — CARE COORDINATION
delivered and she is waiting on these to arrive. She states that the pt has $140 every quarter for his OTC benefit. She states that his next quarter begins 5/1/23 and she plans on choosing from OTC catalog body/bath wipes and possibly bed pads. Noted pt has a f/u with the CHF clinic next wek on 4/25/23 and with his pcp on 5/1/23. Giulia did not voice any current needs/concerns. Ky Escamilla is agreeable to subsequent f/u calls. Addressed changes since last contact:  medications-Metoprolol decreased to 12.5 mg on 4/19/23 per cardiology for low HR  tele monitoring-Pt started RPM  Pt receiving post discharge meals from [employer]. Follow Up  Future Appointments   Date Time Provider Carlos Cornejo   4/25/2023 12:15 PM [facility] CHF ROOM 1 [facility]   5/1/2023 10:00 AM DO Rosario Olvera [facility]   6/29/2023  1:00 PM James Kaiser MD [facility]   7/19/2023 11:00 AM Thea Mo  [facility] Transition Nurse reviewed medical action plan and red flags with family and discussed any barriers to care and/or understanding of plan of care after discharge. Discussed appropriate site of care based on symptoms and resources available to patient including: PCP  Specialist  Urgent care clinics  When to call [address] The family agrees to contact the PCP office for questions related to their healthcare.      Patients top risk factors for readmission: lack of knowledge about disease, medical condition-CHF, COPD, HTN, Asthma, PAF, medication management, multiple health system providers, and polypharmacy  Interventions to address risk factors: Scheduled appointment with PCP-5/1/23, Scheduled appointment with Specialist-See above, and Engage in RPM monitoring, and attend CHF clinic appts       Care Transitions Subsequent and Final Call    Schedule Follow Up Appointment with PCP: Completed  Subsequent and Final Calls  Do you have any ongoing symptoms?: No  Have your

## 2023-04-21 NOTE — CARE COORDINATION
Remote Alert Monitoring Note  Date/Time:  2023 11:39 AM  Patient Current Location: [place] [address]  LPN contacted patient by telephone regarding red alert received for blood pressure reading (89/42), blood pressure heart rate (37), and pulse ox heart rate (38). Verified patients name and  as identifiers. Background: High Blood-Pressure, CHF, Asthma  Refer to 911 immediately if:  Patient unresponsive or unable to provide history  Change in cognition or sudden confusion  Patient unable to respond in complete sentences  Intense chest pain/tightness  Any concern for any clinical emergency  Red Alert: Provider response time of 1 hr required for any red alert requiring intervention  Yellow Alert: Provider response time of 3hr required for any escalated yellow alert  BP Triage  Are you having any Chest Pain? no   Are you having any Shortness of Breath? no   Do you have a headache or have any vision changes? no   Are you having any numbness or tingling? no   Are you having any other health concerns or issues? no   HR Triage  Are you having any Chest Pain? no   Are you having any Shortness of Breath? no   Are you having any dizziness? no   Are you feeling more fatigued or tired than normal? no   Are you having any other health concerns or issues? no   Clinical Interventions: Reviewed and followed up on alerts and treatments-   Spoke with Pts spouse Chema Patton (verified HIPAA in chart) she states Pt has been sleeping a lot. Denies lightheadedness, dizziness, SOB, CP. Cardiologist decreased Toprol to 12.5 mg on 23. Spouse held Toprol today d/t BP reading. Will notify Cardiologist with updated readings. Spouse states that she has had to enter vitals manually and has call out to HRS to troubleshoot. Plan/Follow Up: Will continue to review, monitor and address alerts with follow up based on severity of symptoms and risk factors.

## 2023-04-21 NOTE — CARE COORDINATION
Remote Alert Monitoring Note  Date/Time:  4/21/2023 11:24 AM  Patient Current Location[place] [address]  LPN attempted to contact patient by telephone regarding red alert received for blood pressure reading (89/42), blood pressure heart rate (37), and pulse ox heart rate (38). Background: High Blood-Pressure, CHF, Asthma  Clinical Interventions: Reviewed and followed up on alerts and treatments-LPN left HIPAA compliant message requesting return call. Plan/Follow Up: Will continue to review, monitor and address alerts with follow up based on severity of symptoms and risk factors.

## 2023-04-22 NOTE — CARE COORDINATION
Stop the metoprolol. Blood pressure and heart rate on Monday. I called them and discussed this with him.

## 2023-04-24 ENCOUNTER — TELEPHONE (OUTPATIENT)
Dept: CARDIOLOGY CLINIC | Age: 78
End: 2023-04-24

## 2023-04-24 ENCOUNTER — CARE COORDINATION (OUTPATIENT)
Dept: CARE COORDINATION | Age: 78
End: 2023-04-24

## 2023-04-24 NOTE — TELEPHONE ENCOUNTER
Called wife to obtain BP/P after stopping Metoprolol last week. BP/P last night at 6:00 p.m. was 102/48 (51). He is sleeping so she has not taken it yet today. He can stay off of the metoprolol.

## 2023-04-24 NOTE — CARE COORDINATION
Remote Patient Monitoring Note  Date/Time:  2023 2:42 PM  Patient Current Location: Home: [place] [address]  LPN contacted patient/family by telephone regarding  No Pulse Ox taken > 2 days; No Weight taken > 2 days . Verified patients name and  as identifiers. Background: High Blood-Pressure, CHF, Asthma  Clinical Interventions: Reviewed and followed up on alerts and treatments-Spoke with Pt's spouse, Cadence Melissa (verified HIPAA in chart). She states that she is awaiting new HRS kit. Pt has CHF Clinic appt tomorrow. Plan/Follow Up: Will continue to review, monitor and address alerts with follow up based on severity of symptoms and risk factors.

## 2023-04-25 ENCOUNTER — CARE COORDINATION (OUTPATIENT)
Dept: CARE COORDINATION | Age: 78
End: 2023-04-25

## 2023-04-25 ENCOUNTER — HOSPITAL ENCOUNTER (OUTPATIENT)
Dept: OTHER | Age: 78
Setting detail: THERAPIES SERIES
Discharge: HOME | End: 2023-04-25
Payer: MEDICARE

## 2023-04-25 ENCOUNTER — CARE COORDINATION (OUTPATIENT)
Dept: CASE MANAGEMENT | Age: 78
End: 2023-04-25

## 2023-04-25 DIAGNOSIS — I10 ESSENTIAL HYPERTENSION: ICD-10-CM

## 2023-04-25 DIAGNOSIS — I50.22 CHRONIC SYSTOLIC (CONGESTIVE) HEART FAILURE (HCC): Primary | ICD-10-CM

## 2023-04-25 DIAGNOSIS — J45.41 ASTHMA IN ADULT, MODERATE PERSISTENT, WITH ACUTE EXACERBATION: ICD-10-CM

## 2023-04-25 NOTE — CARE COORDINATION
Remote Alert Monitoring Note  Date/Time:  2023 11:42 AM  Patient Current Location: Home: [place] [address]  LPN contacted patient by telephone regarding red alert received for weight increase (5 lbs x 7 days). Verified patients name and  as identifiers. Background: High Blood-Pressure, CHF, Asthma  Refer to 911 immediately if:  Patient unresponsive or unable to provide history  Change in cognition or sudden confusion  Patient unable to respond in complete sentences  Intense chest pain/tightness  Any concern for any clinical emergency  Red Alert: Provider response time of 1 hr required for any red alert requiring intervention  Yellow Alert: Provider response time of 3hr required for any escalated yellow alert  Clinical Interventions: Reviewed and followed up on alerts and treatments-   Spoke with Pt's spouse, Man Drew (verified HIPAA in chart). She states Pt is asymptomatic, denies SOB, dyspnea, new/increased edema. Spouse indicated that Dr Aria Gonzales discontinued Toprol XL 23 and to take Entresto 24-26 mg 1/2 tab BID. Upon medication review Entresto 24-26 mg should be taken 1 tab BID. LPN placed call to Dr Aria Gonzales office to confirm [facility] dosage. Spouse aware. Call back from Dr Aria Gonzales office, spoke with his MA, Gordon Johnson. Pt to take Entresto 24-26 mg  1 tab BID. Pt spouse aware. Pt stated spouse refused to go to CHF clinic today. Plan/Follow Up: Will continue to review, monitor and address alerts with follow up based on severity of symptoms and risk factors.

## 2023-04-25 NOTE — PLAN OF CARE
Problem: Chronic Conditions and Co-morbidities  Goal: Patient's chronic conditions and co-morbidity symptoms are monitored and maintained or improved  Flowsheets (Taken 4/25/2023 6638)  Care Plan - Patient's Chronic Conditions and Co-Morbidity Symptoms are Monitored and Maintained or Improved: Monitor and assess patient's chronic conditions and comorbid symptoms for stability, deterioration, or improvement

## 2023-04-25 NOTE — CARE COORDINATION
-CTN was contacted by Yuliana Orozco from the [facility] team updating CTN that pt's wife requesting the Complete kit, as the mobile kit is not working right. -CTN routed a message to the [facility] pool requesting Complete kit.

## 2023-04-25 NOTE — CARE COORDINATION
I received the following message from [address] [place] [address]:    Pt is needing a complete kit as the mobile kit is not working right    Order placed to  [employer] and replaced with complete kit.

## 2023-04-25 NOTE — PROGRESS NOTES
Daughter called , pt does NOT want to come to Clinic today. Daughter confirmed  will call back to re schedule.

## 2023-04-25 NOTE — PROGRESS NOTES
4/25/23 12:26 PM       Remote Patient Monitoring Treatment Plan    Received request from ACM/RODOLFO Gee RN  to order complete remote patient monitoring kit for in home monitoring of CHF, HTN, and Asthma and order completed. Patient will be monitoring blood pressure   pulse ox   weight  survey questions. Patient will engage in Remote Patient Monitoring each day to develop the skills necessary for self management. RPM Care Team Responsibilities:   Alerts will be reviewed daily and addressed within 2-4 hours during operational hours (Monday -Friday 9 am-4 pm)  Alert response and intervention documented in patient medical record  Alert response escalated to PCP per protocol and documented in patient medical record  Patient monitored over approximately  days  Discharge from program based on self-management readiness    See care coordination encounters for additional details.       Zach Rivas DNP, FNP-C, Remote Patient Monitoring NP, (Ph) [phone]

## 2023-04-25 NOTE — PROGRESS NOTES
4/25/23 12:24 PM     Remote Patient Order Discontinued    Received request from Miya Lawrence RN  to discontinue mobile kit for remote patient monitoring of CHF, HTN, and Asthma and order completed.      Claude Amy, DNP, FNP-C, Remote Patient Monitoring NP, () [phone]

## 2023-04-26 ENCOUNTER — CARE COORDINATION (OUTPATIENT)
Dept: CARE COORDINATION | Age: 78
End: 2023-04-26

## 2023-04-26 NOTE — CARE COORDINATION
Remote Patient Monitoring Note  Date/Time:  2023 9:36 AM  Patient Current Location: Home: [place] [address]  LPN contacted patient/family by telephone regarding red alert received for weight increase (5 lbs x 7 days). Verified patients name and  as identifiers. Background: High Blood-Pressure, CHF, Asthma  Clinical Interventions: Reviewed and followed up on alerts and treatments-   Spoke with Pts spouse, Andrzej Love (verified HIPAA in chart). She states Pt is asymptomatic, denies SOB, dyspnea, new/increased edema. Pt entering metrics manually, awaiting RPM Full kit. Pt compliant with medications. Pt taking Entresto as ordered. Plan/Follow Up: Will continue to review, monitor and address alerts with follow up based on severity of symptoms and risk factors.

## 2023-04-28 ENCOUNTER — CARE COORDINATION (OUTPATIENT)
Dept: CARE COORDINATION | Age: 78
End: 2023-04-28

## 2023-04-28 ENCOUNTER — TELEPHONE (OUTPATIENT)
Dept: CARDIOLOGY CLINIC | Age: 78
End: 2023-04-28

## 2023-04-28 NOTE — CARE COORDINATION
Remote Alert Monitoring Note  Date/Time:  2023 2:09 PM  Patient Current Location: Home: [place] [address]  LPN contacted patient/family by telephone regarding red alert received for blood pressure reading (85/59) and pulse ox reading (91). Verified patients name and  as identifiers. Background: High Blood-Pressure, CHF, Asthma  Refer to 911 immediately if:  Patient unresponsive or unable to provide history  Change in cognition or sudden confusion  Patient unable to respond in complete sentences  Intense chest pain/tightness  Any concern for any clinical emergency  Red Alert: Provider response time of 1 hr required for any red alert requiring intervention  Yellow Alert: Provider response time of 3hr required for any escalated yellow alert  BP Triage  Are you having any Chest Pain? no   Are you having any Shortness of Breath? no   Do you have a headache or have any vision changes? no   Are you having any numbness or tingling? no   Are you having any other health concerns or issues? no   O2 Triage  Are you having any Chest Pain? no   Are you having any Shortness of Breath? Yes, baseline   Swelling in your hands or feet? no   Are you having any other health concerns or issues? no   Clinical Interventions: Reviewed and followed up on alerts and treatments-   Spoke with Pts spouse, Betsy Chávez (verified HIPAA in chart). She states Pt is asymptomatic, denies new/increased edema, lightheadedness, dizziness. Spouse verified Pt is not taking Toprol XL and she is held  his Entresto today. LPN placed call to Dr Richter Ranks office to check if there are parameters for the [facility] and left voicemail for [facility]. (MA). Pt wearing continuous O2 @ 4L NC, SOB at baseline. Spouse rechecked BP using own monitor BP 97/56 HR 58. Instructed spouse to call PCP, or present to ED if symptoms occur, or become acutely worse. Spouse v/u. Plan/Follow Up:  Will continue to review, monitor and address alerts with

## 2023-05-01 ENCOUNTER — OFFICE VISIT (OUTPATIENT)
Dept: PRIMARY CARE CLINIC | Age: 78
End: 2023-05-01
Payer: MEDICARE

## 2023-05-01 ENCOUNTER — CARE COORDINATION (OUTPATIENT)
Dept: CARE COORDINATION | Age: 78
End: 2023-05-01

## 2023-05-01 VITALS
SYSTOLIC BLOOD PRESSURE: 98 MMHG | BODY MASS INDEX: 26.31 KG/M2 | OXYGEN SATURATION: 99 % | TEMPERATURE: 97.3 F | WEIGHT: 163 LBS | HEART RATE: 58 BPM | DIASTOLIC BLOOD PRESSURE: 62 MMHG

## 2023-05-01 DIAGNOSIS — I48.0 PAF (PAROXYSMAL ATRIAL FIBRILLATION) (HCC): ICD-10-CM

## 2023-05-01 DIAGNOSIS — I50.20 HEART FAILURE WITH REDUCED EJECTION FRACTION (HCC): ICD-10-CM

## 2023-05-01 DIAGNOSIS — F41.8 DEPRESSION WITH ANXIETY: ICD-10-CM

## 2023-05-01 DIAGNOSIS — I42.8 NICM (NONISCHEMIC CARDIOMYOPATHY) (HCC): Primary | ICD-10-CM

## 2023-05-01 PROCEDURE — 1123F ACP DISCUSS/DSCN MKR DOCD: CPT | Performed by: FAMILY MEDICINE

## 2023-05-01 PROCEDURE — 99214 OFFICE O/P EST MOD 30 MIN: CPT | Performed by: FAMILY MEDICINE

## 2023-05-01 RX ORDER — CLONAZEPAM 0.5 MG/1
TABLET ORAL
Qty: 270 TABLET | Refills: 0 | Status: SHIPPED | OUTPATIENT
Start: 2023-05-01 | End: 2023-08-23

## 2023-05-01 SDOH — ECONOMIC STABILITY: FOOD INSECURITY: WITHIN THE PAST 12 MONTHS, YOU WORRIED THAT YOUR FOOD WOULD RUN OUT BEFORE YOU GOT MONEY TO BUY MORE.: NEVER TRUE

## 2023-05-01 SDOH — ECONOMIC STABILITY: HOUSING INSECURITY
IN THE LAST 12 MONTHS, WAS THERE A TIME WHEN YOU DID NOT HAVE A STEADY PLACE TO SLEEP OR SLEPT IN A SHELTER (INCLUDING NOW)?: NO

## 2023-05-01 SDOH — ECONOMIC STABILITY: TRANSPORTATION INSECURITY
IN THE PAST 12 MONTHS, HAS LACK OF TRANSPORTATION KEPT YOU FROM MEETINGS, WORK, OR FROM GETTING THINGS NEEDED FOR DAILY LIVING?: NO

## 2023-05-01 SDOH — ECONOMIC STABILITY: FOOD INSECURITY: WITHIN THE PAST 12 MONTHS, THE FOOD YOU BOUGHT JUST DIDN'T LAST AND YOU DIDN'T HAVE MONEY TO GET MORE.: NEVER TRUE

## 2023-05-01 SDOH — ECONOMIC STABILITY: INCOME INSECURITY: HOW HARD IS IT FOR YOU TO PAY FOR THE VERY BASICS LIKE FOOD, HOUSING, MEDICAL CARE, AND HEATING?: NOT HARD AT ALL

## 2023-05-01 ASSESSMENT — ENCOUNTER SYMPTOMS
SHORTNESS OF BREATH: 1
ABDOMINAL PAIN: 0

## 2023-05-01 NOTE — CARE COORDINATION
Remote Patient Monitoring Note  Date/Time:  2023 12:02 PM  Patient Current Location: Home: [place] [address]  Patient spouse  contacted  LPN  by telephone regarding red alert received for weight increase (5 lbs x 7 days). Verified patients name and  as identifiers. Background: High Blood-Pressure, CHF, Asthma  Clinical Interventions: Reviewed and followed up on alerts and treatments-Pt spouse, Giulia, verified HIPAA contact LPN stating Pt had PCP visit today. Pt asymtomatic, denies CP, SOB (baseline), new/increased edema. Stated Dr Jad Gomez clarified Pt to \"HOLD Entresto if Systolic BP < 526. \"      Plan/Follow Up: Will continue to review, monitor and address alerts with follow up based on severity of symptoms and risk factors.

## 2023-05-01 NOTE — PROGRESS NOTES
month follow-up. Diagnoses and all orders for this visit:    NICM (nonischemic cardiomyopathy) ([facility] [address]    Depression with anxiety  -     clonazePAM (KLONOPIN) 0.5 MG tablet; TAKE 1 TABLET BY MOUTH THREE TIMES DAILY AS NEEDED FOR ANXIETY    Heart failure with reduced ejection fraction (HCC)    PAF (paroxysmal atrial fibrillation) (HCC)    - continue current dose of Klonopin tid  - 2L fluid restriction daily  - 4 L oxygen daily  - follow up with chf clinic  - samples Jardiance 10 mg given # 28  - continue Eliquis    Return in about 3 months (around 8/1/2023) for or for acute problem.     Samuel Carney, DO

## 2023-05-02 ENCOUNTER — CARE COORDINATION (OUTPATIENT)
Dept: CARE COORDINATION | Age: 78
End: 2023-05-02

## 2023-05-02 NOTE — CARE COORDINATION
Remote Patient Monitoring Note  Date/Time:  2023 10:49 AM  Patient Current Location: [place] [address]  LPN contacted patient by telephone regarding red alert received for weight increase (5 lbs x 7 days). Verified patients name and  as identifiers. Background: High Blood-Pressure, CHF, Asthma  Clinical Interventions: Reviewed and followed up on alerts and treatments-   Spoke with Pts spouse, Bhargavi Herrera (verified HIPAA in chart). She states Pt is asymptomatic, denies SOB, dyspnea, new/increased edema. She states that the Pt is feeling better and his appetite is improving. Pt had PCP f/u yesterday and has CHF Clinic appt 23. Plan/Follow Up: Will continue to review, monitor and address alerts with follow up based on severity of symptoms and risk factors.

## 2023-05-03 ENCOUNTER — CARE COORDINATION (OUTPATIENT)
Dept: CASE MANAGEMENT | Age: 78
End: 2023-05-03

## 2023-05-03 NOTE — CARE COORDINATION
[facility] Care Transitions Follow Up Call    Patient Current Location: [address] Transition Nurse contacted the  pt's wife, Randy Jones,  by telephone to follow up after admission on 23. Patient: Ai Farr  Patient : 1945   MRN: [id number]  Reason for Admission: Hypoxia  Discharge Date: 23 RARS: Readmission Risk Score: 11.4      Needs to be reviewed by the provider   Additional needs identified to be addressed with provider: No  none             Method of communication with provider: none. CTN spoke to the pt's wife, Randy Jones (On HIPAA), for a sub care transition call. Guilia was out at the store but could talk briefly. Pt admitted on 23 with dx hypoxia, acute resp failure, and +rhinovirus (+23). Pt presented to ED with c/o cough, sob, and wheezing. Giulia states that she feels the pt is doing [employer] and offered no complaints at this time. She denies that pt has had any c/o sob, chest congestion, wheezing, or increased cough. Pt is on O2 @ 4L ([employer]) and SaO2 levels remaining >90% on current liter flow. Giulia states that pt is currently out with his friends for coffee and that he seems to be doing much better. She states that the pt had a f/u with Dr. Beata Andino (allergist) today and had an allergy shot. She states he will receive allergy shots 2x/wk x 3 months for now. Pt also had labs completed for the [place] today and will f/u with Dr. Debby Thomas at the [place] next week on 23. Pt recently completed a f/u visit with his pcp on 23. Reviewed OV notes. Giulia states that pcp provided pt with samples for his Jardiance 10 mg daily (pt received 30 day supply). She states that she will see if the pt's pcp will be agreeable to send a rx to the [place] for Jardiance. She states that prefers to send a MyChart message to the pt's pcp to inquire. Pt is engaging in RPM monitoring.  Randy Jones is very pleased with the program. She states that she feels that the pt's BP and HR are doing better since

## 2023-05-05 ENCOUNTER — CARE COORDINATION (OUTPATIENT)
Dept: CARE COORDINATION | Age: 78
End: 2023-05-05

## 2023-05-05 NOTE — CARE COORDINATION
Remote Patient Monitoring Note  Date/Time:  2023 10:54 AM  Patient Current Location: [place] [address]  LPN contacted patient by telephone regarding red alert received for weight increase (5 lbs x 7 days). Verified patients name and  as identifiers. Background:  High Blood-Pressure, CHF, Asthma  Clinical Interventions: Reviewed and followed up on alerts and treatments-   Spoke with Pts spouse, Cecilio Edwards (verified HIPAA in chart). She states Pt is asymptomatic, denies SOB, dyspnea, new/increased edema. She states that the Pt's weight is returning to normal and has no concerns. She is in close contact with PCP and [facility]. Pt compliant with medications and taking diuretic as ordered. Plan/Follow Up: Will continue to review, monitor and address alerts with follow up based on severity of symptoms and risk factors.

## 2023-05-08 ENCOUNTER — CARE COORDINATION (OUTPATIENT)
Dept: CARE COORDINATION | Age: 78
End: 2023-05-08

## 2023-05-08 NOTE — CARE COORDINATION
Remote Patient Monitoring Note  Date/Time:  2023 10:39 AM  [facility] [address]  LPN contacted patient by telephone regarding red alert received for weight increase (5 lbs x 7 days). Verified patients name and  as identifiers. Background: High Blood-Pressure, CHF, Asthma  Clinical Interventions: Reviewed and followed up on alerts and treatments-   Spoke with Pts spouse, Franko Huang (verified HIPAA in chart). She states Pt is asymptomatic, denies SOB, dyspnea, new/increased edema. She states that the Pt's weight is returning to normal and has no concerns. She is in close contact with PCP and [address] [place] [address]. Pt compliant with medications and taking diuretic as ordered. Plan/Follow Up: Will continue to review, monitor and address alerts with follow up based on severity of symptoms and risk factors.

## 2023-05-09 ENCOUNTER — CARE COORDINATION (OUTPATIENT)
Dept: CARE COORDINATION | Age: 78
End: 2023-05-09

## 2023-05-09 NOTE — CARE COORDINATION
Remote Alert Monitoring Note  Date/Time:  2023 11:17 AM  Patient Current Location: Home: [facility]   LPN contacted patient/family by telephone regarding red alert received for pulse ox reading (90) and weight increase (3 lbs x 1 day, 5 lbs x 7 days). Verified patients name and  as identifiers. Background: High Blood-Pressure, CHF, Asthma  Refer to 911 immediately if:  Patient unresponsive or unable to provide history  Change in cognition or sudden confusion  Patient unable to respond in complete sentences  Intense chest pain/tightness  Any concern for any clinical emergency  Red Alert: Provider response time of 1 hr required for any red alert requiring intervention  Yellow Alert: Provider response time of 3hr required for any escalated yellow alert  O2 Triage  Are you having any Chest Pain? no   Are you having any Shortness of Breath? no   Swelling in your hands or feet? no   Are you having any other health concerns or issues? no   Clinical Interventions: Reviewed and followed up on alerts and treatments-Spoke with Pt and spouse. Pt asymptomatic denies CP, SOB, new/increased edema. Pt [facility] today and weight was 172 lbs, which he stated was his normal weight prior to hospitalization. Pt stated that his sleep pattern has been off as well as eating habits and medication times. Pt is feeling much improved since discharge and appetite is returning to normal. Pt unable to recheck Pulse/Ox d/t not at home at this time. Plan/Follow Up: Will continue to review, monitor and address alerts with follow up based on severity of symptoms and risk factors.

## 2023-05-10 ENCOUNTER — CARE COORDINATION (OUTPATIENT)
Dept: CARE COORDINATION | Age: 78
End: 2023-05-10

## 2023-05-10 NOTE — CARE COORDINATION
Remote Alert Monitoring Note  Date/Time:  5/10/2023 1:49 PM  Patient Current Location: [place] [address]  LPN contacted patient by telephone regarding red alert received for pulse ox reading (90) and weight increase (5 lbs x 7 days). Verified patients name and  as identifiers. Background: High Blood-Pressure, CHF, Asthma  Refer to 911 immediately if:  Patient unresponsive or unable to provide history  Change in cognition or sudden confusion  Patient unable to respond in complete sentences  Intense chest pain/tightness  Any concern for any clinical emergency  Red Alert: Provider response time of 1 hr required for any red alert requiring intervention  Yellow Alert: Provider response time of 3hr required for any escalated yellow alert  O2 Triage  Are you having any Chest Pain? no   Are you having any Shortness of Breath? no   Swelling in your hands or feet? no   Are you having any other health concerns or issues? no   Clinical Interventions: Reviewed and followed up on alerts and treatments-   Spoke with Pt and spouse. Pt asymptomatic denies CP, SOB, new/increased edema. Pt stated was his weight is closer to his weight prior to hospitalization. Pt stated that his sleep pattern has been off as well as eating habits and medication times. Pt is feeling much improved since discharge and appetite is returning to normal. Upon recheck SpO2 97%  Plan/Follow Up: Will continue to review, monitor and address alerts with follow up based on severity of symptoms and risk factors.

## 2023-05-11 ENCOUNTER — CARE COORDINATION (OUTPATIENT)
Dept: CARE COORDINATION | Age: 78
End: 2023-05-11

## 2023-05-12 ENCOUNTER — CARE COORDINATION (OUTPATIENT)
Dept: CASE MANAGEMENT | Age: 78
End: 2023-05-12

## 2023-05-12 NOTE — CARE COORDINATION
-Warm handoff sent to ACM, Lamonte Milligan, at this time.  -Clinician changed in HRS to Lamonte Milligan. -CTN signing off at this time. -CTN reminded ACM to resolve CT episode when opening CC episode.
Transition Nurse reviewed medical action plan and red flags with family and discussed any barriers to care and/or understanding of plan of care after discharge. Discussed appropriate site of care based on symptoms and resources available to patient including: PCP  Specialist  Urgent care clinics  When to call [facility] . The family agrees to contact the PCP office for questions related to their healthcare. Care Transitions Subsequent and Final Call    Schedule Follow Up Appointment with PCP: Completed  Subsequent and Final Calls  Do you have any ongoing symptoms?: No  Have your medications changed?: No  Do you have any questions related to your medications?: No  Do you currently have any active services?: Yes  Are you currently active with any services?: Other, Outpatient/Community Services  Do you have any needs or concerns that I can assist you with?: No  Identified Barriers: Lack of Education  Care Transitions Interventions     Other Services: Completed (Comment: Enrolled RPM 4/14/23)   CHF Nurse: Completed      Other Interventions:             No further follow-up call indicated based on severity of symptoms and risk factors. CC program referral being sent. CTN signing off and providing warm handoff to [facility]. Clinician in [facility] will be changed to [facility] to resolve CT episode upon opening CC episode.      Ivanna Zhu RN

## 2023-05-12 NOTE — CARE COORDINATION
Remote Alert Monitoring Note  Date/Time:  2023 10:08 AM  Patient Current Location: Home: [facility]  LPN contacted patient by telephone regarding red alert received for weight increase (5 POUNDS in 7 DAYS   168.4). Verified patients name and  as identifiers. Background: ENROLLED IN [facility] for HTN CHF and ASTHMA  Refer to 911 immediately if:  Patient unresponsive or unable to provide history  Change in cognition or sudden confusion  Patient unable to respond in complete sentences  Intense chest pain/tightness  Any concern for any clinical emergency  Red Alert: Provider response time of 1 hr required for any red alert requiring intervention  Yellow Alert: Provider response time of 3hr required for any escalated yellow alert    Weight Scale Triage  Was your weight obtained upon rising/waking today? YES   Was your weight obtained after voiding and/or use of the bathroom today? yes   Did you weigh yourself in the same amount of clothing today, compared to how you typically do? yes   Was the scale bumped or moved prior to today's weight? yes   Is your scale on a flat/hard surface? yes   Did you obtain your weight with shoes on? no   If yes, is this something you normally do during your daily weights? yes   Were you standing up straight on the scale today? yes   Were you leaning on anything while obtaining your weight today? no     Clinical Interventions: Reviewed and followed up on alerts and treatments-Spoke to wife Giulia . Giulia reports pt is doing well. He has no increased edema or dyspnea. Appetite is good. Pt lost 10 pounds in the hospital and is now getting back to his pre hospital weight. Takes all medications as directed. Seen by [address] this week. Pt has an appt with CHF clinic on 23. Plan/Follow Up: Will continue to review, monitor and address alerts with follow up based on severity of symptoms and risk factors.

## 2023-05-18 ENCOUNTER — HOSPITAL ENCOUNTER (OUTPATIENT)
Dept: OTHER | Age: 78
Setting detail: THERAPIES SERIES
Discharge: HOME | End: 2023-05-18
Payer: MEDICARE

## 2023-05-18 VITALS
OXYGEN SATURATION: 99 % | DIASTOLIC BLOOD PRESSURE: 54 MMHG | HEART RATE: 54 BPM | BODY MASS INDEX: 28.08 KG/M2 | SYSTOLIC BLOOD PRESSURE: 107 MMHG | WEIGHT: 174 LBS | RESPIRATION RATE: 18 BRPM

## 2023-05-18 LAB
ANION GAP SERPL CALCULATED.3IONS-SCNC: 8 MMOL/L (ref 7–16)
BNP BLD-MCNC: 239 PG/ML (ref 0–450)
BUN SERPL-MCNC: 14 MG/DL (ref 6–23)
CALCIUM SERPL-MCNC: 9.9 MG/DL (ref 8.6–10.2)
CHLORIDE SERPL-SCNC: 105 MMOL/L (ref 98–107)
CO2 SERPL-SCNC: 23 MMOL/L (ref 22–29)
CREAT SERPL-MCNC: 0.7 MG/DL (ref 0.7–1.2)
GLUCOSE SERPL-MCNC: 99 MG/DL (ref 74–99)
POTASSIUM SERPL-SCNC: 4 MMOL/L (ref 3.5–5)
SODIUM SERPL-SCNC: 136 MMOL/L (ref 132–146)

## 2023-05-18 PROCEDURE — 83880 ASSAY OF NATRIURETIC PEPTIDE: CPT

## 2023-05-18 PROCEDURE — 36415 COLL VENOUS BLD VENIPUNCTURE: CPT

## 2023-05-18 PROCEDURE — 80048 BASIC METABOLIC PNL TOTAL CA: CPT

## 2023-05-18 PROCEDURE — 99214 OFFICE O/P EST MOD 30 MIN: CPT

## 2023-05-18 NOTE — PLAN OF CARE
Problem: Chronic Conditions and Co-morbidities  Goal: Patient's chronic conditions and co-morbidity symptoms are monitored and maintained or improved  5/18/2023 1256 by Camila Bueno RN  Outcome: Progressing  5/18/2023 0659 by Gabriel Rogers RN  Flowsheets (Taken 5/18/2023 8863)  Care Plan - Patient's Chronic Conditions and Co-Morbidity Symptoms are Monitored and Maintained or Improved: Monitor and assess patient's chronic conditions and comorbid symptoms for stability, deterioration, or improvement

## 2023-05-18 NOTE — PROGRESS NOTES
Congestive Heart Failure [address] [place] [address] [address]   1945          Referring Provider: Anil Melissa  Primary Care Physician:   Cardiologist: Anil Melissa  Nephrologist: n/a        History of Present Illness:     Tash Osuna is a 68 y.o. male with a history of HFrEF, most recent EF 40% on 3/28/23    Patient Story:    He does  have dyspnea with exertion, shortness of breath, or decline in overall functional capacity. He does not have orthopnea, PND, nocturnal cough or hemoptysis. He does not have abdominal distention or bloating, early satiety, anorexia/change in appetite. He does has a good urinary response to  oral diuretic. He does not have  lower extremity edema. He denies lightheadedness, dizziness. He denies palpitations, syncope or near syncope. He does not complain of chest pain, pressure, discomfort.          No Known Allergies        Current Outpatient Medications on File Prior to Encounter   Medication Sig Dispense Refill    albuterol sulfate (PROAIR RESPICLICK) 458 (90 Base) MCG/ACT aerosol powder inhalation Inhale 2 puffs into the lungs every 4 hours as needed for Shortness of Breath or Wheezing 1 each 3    clonazePAM (KLONOPIN) 0.5 MG tablet TAKE 1 TABLET BY MOUTH THREE TIMES DAILY AS NEEDED FOR ANXIETY 270 tablet 0    Cholecalciferol (VITAMIN D3) 50 MCG (2000 UT) CAPS Take 1 capsule by mouth daily      empagliflozin (JARDIANCE) 10 MG tablet Take 1 tablet by mouth daily (Patient taking differently: Take 1 tablet by mouth daily Taking 1/2 of 25 mg tab) 30 tablet 0    ipratropium-albuterol (DUONEB) 0.5-2.5 (3) MG/3ML SOLN nebulizer solution Inhale 3 mLs into the lungs every 6 hours as needed for Shortness of Breath or Wheezing 90 each 5    sacubitril-valsartan (ENTRESTO) 24-26 MG per tablet Take 1 tablet by mouth 2 times daily 180 tablet 3    spironolactone (ALDACTONE) 25 MG tablet Take 0.5 tablets by mouth daily 45 tablet 3

## 2023-05-18 NOTE — PLAN OF CARE
Problem: Chronic Conditions and Co-morbidities  Goal: Patient's chronic conditions and co-morbidity symptoms are monitored and maintained or improved  Flowsheets (Taken 5/18/2023 7334)  Care Plan - Patient's Chronic Conditions and Co-Morbidity Symptoms are Monitored and Maintained or Improved: Monitor and assess patient's chronic conditions and comorbid symptoms for stability, deterioration, or improvement

## 2023-05-19 ENCOUNTER — CARE COORDINATION (OUTPATIENT)
Dept: CARE COORDINATION | Age: 78
End: 2023-05-19

## 2023-05-19 NOTE — CARE COORDINATION
Remote Patient Monitoring Note  Date/Time:  5/19/2023 12:04 PM  Patient Current Location[place] [address]  LPN noted red alert received for pulse ox heart rate (49). Background: High Blood-Pressure, CHF, Asthma  Clinical Interventions: Reviewed and followed up on alerts and treatments-Upon chart review Pt's Blood Pressure Heart Rate 50. Within normal parameters. Plan/Follow Up: Will continue to review, monitor and address alerts with follow up based on severity of symptoms and risk factors.

## 2023-05-22 ENCOUNTER — CARE COORDINATION (OUTPATIENT)
Dept: CARE COORDINATION | Age: 78
End: 2023-05-22

## 2023-05-22 NOTE — CARE COORDINATION
Remote Patient Monitoring Note  Date/Time:  2023 8:59 AM  Patient Current Location: [place] [address]  LPN contacted patient by telephone regarding red alert received for weight increase (3 lbs x 1 day). Verified patients name and  as identifiers. Background: High Blood-Pressure, CHF, Asthma  Clinical Interventions: Reviewed and followed up on alerts and treatments-Spoke with Pt's spouse Giulia (verified HIPAA in chart). She states that Pt is in bed, but will check for swelling next time she checks on him. She denied Pt having SOB. Pt wearing 4L NC SpO2  95%. Pt compliant with medications. Taking Lasix and Aldactone as prescribed. Pt active with CHF clinic. Plan/Follow Up: Will continue to review, monitor and address alerts with follow up based on severity of symptoms and risk factors.

## 2023-05-31 ENCOUNTER — CARE COORDINATION (OUTPATIENT)
Dept: CARE COORDINATION | Age: 78
End: 2023-05-31

## 2023-05-31 SDOH — ECONOMIC STABILITY: HOUSING INSECURITY: IN THE LAST 12 MONTHS, HOW MANY PLACES HAVE YOU LIVED?: 1

## 2023-05-31 SDOH — ECONOMIC STABILITY: INCOME INSECURITY: IN THE LAST 12 MONTHS, WAS THERE A TIME WHEN YOU WERE NOT ABLE TO PAY THE MORTGAGE OR RENT ON TIME?: NO

## 2023-05-31 SDOH — ECONOMIC STABILITY: TRANSPORTATION INSECURITY
IN THE PAST 12 MONTHS, HAS THE LACK OF TRANSPORTATION KEPT YOU FROM MEDICAL APPOINTMENTS OR FROM GETTING MEDICATIONS?: NO

## 2023-05-31 ASSESSMENT — SOCIAL DETERMINANTS OF HEALTH (SDOH)
DO YOU BELONG TO ANY CLUBS OR ORGANIZATIONS SUCH AS CHURCH GROUPS UNIONS, FRATERNAL OR ATHLETIC GROUPS, OR SCHOOL GROUPS?: NO
HOW OFTEN DO YOU ATTENT MEETINGS OF THE CLUB OR ORGANIZATION YOU BELONG TO?: NEVER
HOW OFTEN DO YOU GET TOGETHER WITH FRIENDS OR RELATIVES?: THREE TIMES A WEEK
HOW OFTEN DO YOU ATTEND CHURCH OR RELIGIOUS SERVICES?: 1 TO 4 TIMES PER YEAR
IN A TYPICAL WEEK, HOW MANY TIMES DO YOU TALK ON THE PHONE WITH FAMILY, FRIENDS, OR NEIGHBORS?: MORE THAN THREE TIMES A WEEK

## 2023-05-31 NOTE — CARE COORDINATION
Ambulatory Care Coordination Note  2023    [facility] [address]    ACM contacted the patient by telephone. Verified name and  with patient as identifiers. Provided introduction to self, and explanation of the ACM role. ACM: Linden Gregory RN    Challenges to be reviewed by the provider   Additional needs identified to be addressed with provider: No  none               Method of communication with provider: none. Explained the Care Coordination Program to patient. Verbalized understanding and is agreeable to service. Spoke with [facility] who is familiar with calls due to pt was previously followed by CTN. Pt is also enrolled in RPM, however has not been engaged for a few days due to not feeling up to his monitoring. Wife/Giulia states she will get him back on board as soon as she can. He is not always motivated and engaged. Giulia states Holley Lopez has so many things going on with his health and it bothers him. We reviewed all follow up appointments, medications. Reviewed low salt diet, weight gain and how to watch for edema. She states his weight remains around 172-173. She has not noticed any lower leg edema or abdominal swelling at this time. Appetite is fair and he is tolerating. He continues to use his oxygen at 4 LNC/ 24 hours per day. He is followed by the CHF clinic. Will continue to follow and support for needs. CHF:  Denies s/s CHF Exacerbation. Discussed Zone Tool and verbalizes understanding.   Today's weight: 172-173     Follow HF Zone Tool:  Has Caregiver Assistance, Verbalizes Understanding, Needs Reinforcement, and Able to teachback  Daily weights before breakfast and log them:  Has Caregiver Assistance, Verbalizes Understanding, Needs Reinforcement, and Able to teachback  Follow low sodium diet:  Has Caregiver Assistance, Verbalizes Understanding, Needs Reinforcement, and Able to teachback  Report weight gain or loss of greater

## 2023-06-05 ENCOUNTER — CARE COORDINATION (OUTPATIENT)
Dept: CARE COORDINATION | Age: 78
End: 2023-06-05

## 2023-06-05 NOTE — CARE COORDINATION
Remote Alert Monitoring Note  Date/Time:  2023 1:17 PM  Patient [place] Location[place] [address]  LPN contacted patient by telephone regarding red alert received for pulse ox heart rate (45). Verified patients name and  as identifiers. Challenges to be reviewed by the provider   Additional needs identified to be addressed with provider No          Background: High Blood-Pressure, CHF, Asthma  Refer to 911 immediately if:  Patient unresponsive or unable to provide history  Change in cognition or sudden confusion  Patient unable to respond in complete sentences  Intense chest pain/tightness  Any concern for any clinical emergency  Red Alert: Provider response time of 1 hr required for any red alert requiring intervention  Yellow Alert: Provider response time of 3hr required for any escalated yellow alert  HR Triage  Are you having any Chest Pain? no   Are you having any Shortness of Breath? no   Are you having any dizziness? no   Are you feeling more fatigued or tired than normal? no   Are you having any other health concerns or issues? no   Clinical Interventions: Reviewed and followed up on alerts and treatments-Pt asymptomatic, denies CP, SOB, lightheadedness, fatigue. Spoke with Pt spouse Madelyn Conroydeepak (verified HIPAA in chart) She stated that BP cuff not working properly. HRS Support information given for help troubleshooting equipment. Plan/Follow Up: Will continue to review, monitor and address alerts with follow up based on severity of symptoms and risk factors.

## 2023-06-06 ENCOUNTER — CARE COORDINATION (OUTPATIENT)
Dept: CARE COORDINATION | Age: 78
End: 2023-06-06

## 2023-06-06 NOTE — CARE COORDINATION
I received the following e-mail form HRS:      We received a call from the patient in possession of [id number] (PID [id number]). The patient stated they would like to return their equipment. I let the patient know that I would inform their clinical team of this request.     If you would like to submit a return request for this patient's equipment, please do so using [email or web address], if you haven't done so already.

## 2023-06-07 ENCOUNTER — CARE COORDINATION (OUTPATIENT)
Dept: CARE COORDINATION | Age: 78
End: 2023-06-07

## 2023-06-07 ENCOUNTER — CARE COORDINATION (OUTPATIENT)
Dept: CASE MANAGEMENT | Age: 78
End: 2023-06-07

## 2023-06-07 VITALS
BODY MASS INDEX: 28.21 KG/M2 | SYSTOLIC BLOOD PRESSURE: 104 MMHG | HEART RATE: 49 BPM | DIASTOLIC BLOOD PRESSURE: 54 MMHG | WEIGHT: 174.8 LBS | OXYGEN SATURATION: 94 %

## 2023-06-07 DIAGNOSIS — I50.22 CHRONIC SYSTOLIC (CONGESTIVE) HEART FAILURE (HCC): Primary | ICD-10-CM

## 2023-06-07 DIAGNOSIS — J45.41 ASTHMA IN ADULT, MODERATE PERSISTENT, WITH ACUTE EXACERBATION: ICD-10-CM

## 2023-06-07 DIAGNOSIS — I10 ESSENTIAL HYPERTENSION: ICD-10-CM

## 2023-06-07 NOTE — CARE COORDINATION
Remote Alert Monitoring Note  Date/Time:  6/7/2023 9:48 AM  Patient Current Location: Home: [place] [address]  LPN  attempted to contact patient by telephone regarding red alert received for blood pressure heart rate (49). No answer.  Left HIPPA compliant message to return call to this writer  Background: ENROLLED IN RPM for HTN CHF and Asthma

## 2023-06-07 NOTE — PROGRESS NOTES
6/7/23 12:50 PM     Remote Patient Order Discontinued    Received request from [facility], RN  to discontinue order for remote patient monitoring of CHF, HTN, and Asthma and order completed.      Eliot Wilhelm DNP, FNP-C, Remote Patient Monitoring NP, ) [phone]

## 2023-06-07 NOTE — CARE COORDINATION
Left HIPAA compliant message for patient to return call to [phone]. Re: Follow up: Message let for follow up CC and RPM . Pt had an alert for weight gain.  Notified that pt wants to discontinue his RPM. Will continue to reach out., Review MARIANELA Montoya RN  Ambulatory Care Manager  Cell: [phone]

## 2023-06-07 NOTE — CARE COORDINATION
CCSS placed call to patient to arrange RPM kit  through [facility]. Reviewed with patient how to pack equipment in original packing. Verified patient's availability to schedule UPS  time. UPS  time requested. Anticipated  date range 2 to 4 business days.

## 2023-06-07 NOTE — CARE COORDINATION
Remote Alert Monitoring Note    Date/Time:  2023 11:00 AM  Patient Current Location: [place] [address]  LPN contacted patient by telephone regarding red alert received for blood pressure heart rate (49). Verified patients name and  as identifiers. Challenges to be reviewed by the provider   Additional needs identified to be addressed with provider No                  Background: enrolled in RPM for HTN CHF and Asthma  Refer to 911 immediately if:  Patient unresponsive or unable to provide history  Change in cognition or sudden confusion  Patient unable to respond in complete sentences  Intense chest pain/tightness  Any concern for any clinical emergency  Red Alert: Provider response time of 1 hr required for any red alert requiring intervention  Yellow Alert: Provider response time of 3hr required for any escalated yellow alert    HR Triage  Are you having any Chest Pain? no   Are you having any Shortness of Breath? no   Are you having any dizziness? no   Are you feeling more fatigued or tired than normal? no   Are you having any other health concerns or issues? no     Clinical Interventions: Reviewed and followed up on alerts and treatments-spoke to wife Giulia regarding low heart rate   Giulia reports [employer] is doing well. No chest pain or dyspnea. No abnormal fatigue or dizziness. Giulia reports she has requested to discontinue RPM. She packed up equipment today. Message routed to PCP, ACM, Doris Smith and .   Plan/Follow Up: Will continue to review, monitor and address alerts with follow up based on severity of symptoms and risk factors.

## 2023-06-29 ENCOUNTER — OFFICE VISIT (OUTPATIENT)
Dept: NON INVASIVE DIAGNOSTICS | Age: 78
End: 2023-06-29
Payer: OTHER GOVERNMENT

## 2023-06-29 VITALS
OXYGEN SATURATION: 99 % | DIASTOLIC BLOOD PRESSURE: 60 MMHG | RESPIRATION RATE: 18 BRPM | HEART RATE: 71 BPM | SYSTOLIC BLOOD PRESSURE: 100 MMHG | WEIGHT: 176.8 LBS | BODY MASS INDEX: 28.42 KG/M2 | HEIGHT: 66 IN

## 2023-06-29 DIAGNOSIS — I48.0 PAF (PAROXYSMAL ATRIAL FIBRILLATION) (HCC): Primary | ICD-10-CM

## 2023-06-29 PROCEDURE — 1123F ACP DISCUSS/DSCN MKR DOCD: CPT | Performed by: INTERNAL MEDICINE

## 2023-06-29 PROCEDURE — 93000 ELECTROCARDIOGRAM COMPLETE: CPT | Performed by: INTERNAL MEDICINE

## 2023-06-29 PROCEDURE — 99204 OFFICE O/P NEW MOD 45 MIN: CPT | Performed by: INTERNAL MEDICINE

## 2023-06-29 RX ORDER — FUROSEMIDE 20 MG/1
20 TABLET ORAL DAILY
Qty: 90 TABLET | Refills: 3 | Status: SHIPPED | OUTPATIENT
Start: 2023-06-29

## 2023-07-03 ENCOUNTER — CARE COORDINATION (OUTPATIENT)
Dept: CARE COORDINATION | Age: 78
End: 2023-07-03

## 2023-07-03 NOTE — CARE COORDINATION
Ambulatory Care Coordination Note  7/3/2023    Patient Current Location:  Home: [address] [facility] [address] [address]     ACM contacted the family and caregiver by telephone. Verified name and  with family and caregiver as identifiers. Provided introduction to self, and explanation of the ACM role. Challenges to be reviewed by the provider   Additional needs identified to be addressed with provider: No  none               Method of communication with provider: phone. ACM: Connor Chilel, RN    120/73, HR 74, weight 171, Recent readings per family. Pt was doing better however is falling into old habits. He is not moving as much, has good days and bad. Pt's wife Reggie Tavarez is very dedicated and involved in pt's care. She works strongly with him to manage and improve his health. Pt is now wearing a heart monitor and will have for 2 weeks per Dr Greyson Lea. He continues to have episodes of Afib. He did have an episode of dyspnea last pm and used his nebulizer which helps. He takes all of his medication as directed. Giulia is aware of all follow up appointments, including [facility] and [facility]. They have a great rapport with all of [facility] physicians. This ACM reviewed lab work , both past and present with pt's wife, education completed on testing and results. Reviewed medication and heart monitor. Request next call in 2 weeks after seeing CHF clinic and cardiology. CHF:  Denies s/s CHF Exacerbation. Discussed Zone Tool and verbalizes understanding. Today's weight: 171. FOLLOW-UP PLAN:    Continue Care Coordination and Assess Plan Of Care  -CHF Management/Zone Tool  -Current Weight  -Current Blood Pressure  -Falls/Near Falls? -Appointment Reminders    Next Anticipated Outreach by [address] [place] [address] Team:  2 Weeks      Offered patient enrollment in the Remote Patient Monitoring (RPM) program for in-home monitoring:  Decided not to use equipment. Family keeps a record .     Lab Results       None            Care

## 2023-07-12 ENCOUNTER — HOSPITAL ENCOUNTER (OUTPATIENT)
Dept: OTHER | Age: 78
Setting detail: THERAPIES SERIES
Discharge: HOME | End: 2023-07-12
Payer: MEDICARE

## 2023-07-12 VITALS
WEIGHT: 177 LBS | OXYGEN SATURATION: 97 % | DIASTOLIC BLOOD PRESSURE: 55 MMHG | BODY MASS INDEX: 28.57 KG/M2 | HEART RATE: 55 BPM | RESPIRATION RATE: 18 BRPM | SYSTOLIC BLOOD PRESSURE: 104 MMHG

## 2023-07-12 LAB
ANION GAP SERPL CALCULATED.3IONS-SCNC: 9 MMOL/L (ref 7–16)
BNP BLD-MCNC: 163 PG/ML (ref 0–450)
BUN SERPL-MCNC: 22 MG/DL (ref 6–23)
CALCIUM SERPL-MCNC: 10.4 MG/DL (ref 8.6–10.2)
CHLORIDE SERPL-SCNC: 106 MMOL/L (ref 98–107)
CO2 SERPL-SCNC: 28 MMOL/L (ref 22–29)
CREAT SERPL-MCNC: 0.9 MG/DL (ref 0.7–1.2)
GLUCOSE SERPL-MCNC: 98 MG/DL (ref 74–99)
POTASSIUM SERPL-SCNC: 4.6 MMOL/L (ref 3.5–5)
SODIUM SERPL-SCNC: 143 MMOL/L (ref 132–146)

## 2023-07-12 PROCEDURE — 36415 COLL VENOUS BLD VENIPUNCTURE: CPT

## 2023-07-12 PROCEDURE — 83880 ASSAY OF NATRIURETIC PEPTIDE: CPT

## 2023-07-12 PROCEDURE — 99214 OFFICE O/P EST MOD 30 MIN: CPT

## 2023-07-12 PROCEDURE — 80048 BASIC METABOLIC PNL TOTAL CA: CPT

## 2023-07-12 NOTE — PROGRESS NOTES
Congestive Heart Failure [address] [facility] [address] [facility] [address]   1945          Referring Provider: Michoacano Garcia  Primary Care Physician:   Cardiologist: Michoacano Garcia  Nephrologist: n/a        History of Present Illness:     Otto Silveira is a 68 y.o. male with a history of HFrEF, most recent EF 35-40%. Patient Story:    He does   not have dyspnea with exertion, shortness of breath, or decline in overall functional capacity. He does not have orthopnea, PND, nocturnal cough or hemoptysis. He does not have abdominal distention or bloating, early satiety, anorexia/change in appetite. He does has a good urinary response to  oral diuretic. He does not have  lower extremity edema. He denies lightheadedness, dizziness. He denies palpitations, syncope or near syncope. He does not complain of chest pain, pressure, discomfort.          No Known Allergies        Current Outpatient Medications on File Prior to Encounter   Medication Sig Dispense Refill    furosemide (LASIX) 20 MG tablet Take 1 tablet by mouth daily 90 tablet 3    albuterol sulfate (PROAIR RESPICLICK) 942 (90 Base) MCG/ACT aerosol powder inhalation Inhale 2 puffs into the lungs every 4 hours as needed for Shortness of Breath or Wheezing 1 each 3    clonazePAM (KLONOPIN) 0.5 MG tablet TAKE 1 TABLET BY MOUTH THREE TIMES DAILY AS NEEDED FOR ANXIETY 270 tablet 0    Cholecalciferol (VITAMIN D3) 50 MCG (2000 UT) CAPS Take 1 capsule by mouth daily      empagliflozin (JARDIANCE) 10 MG tablet Take 1 tablet by mouth daily (Patient taking differently: Take 1 tablet by mouth daily Taking 1/2 of 25 mg tab) 30 tablet 0    ipratropium-albuterol (DUONEB) 0.5-2.5 (3) MG/3ML SOLN nebulizer solution Inhale 3 mLs into the lungs every 6 hours as needed for Shortness of Breath or Wheezing 90 each 5    sacubitril-valsartan (ENTRESTO) 24-26 MG per tablet Take 1 tablet by mouth 2 times daily 180 tablet 3

## 2023-07-12 NOTE — PLAN OF CARE
Problem: Chronic Conditions and Co-morbidities  Goal: Patient's chronic conditions and co-morbidity symptoms are monitored and maintained or improved  Flowsheets (Taken 7/12/2023 8628)  Care Plan - Patient's Chronic Conditions and Co-Morbidity Symptoms are Monitored and Maintained or Improved: Monitor and assess patient's chronic conditions and comorbid symptoms for stability, deterioration, or improvement

## 2023-07-19 ENCOUNTER — OFFICE VISIT (OUTPATIENT)
Dept: CARDIOLOGY CLINIC | Age: 78
End: 2023-07-19
Payer: OTHER GOVERNMENT

## 2023-07-19 VITALS
BODY MASS INDEX: 29.04 KG/M2 | HEART RATE: 65 BPM | RESPIRATION RATE: 16 BRPM | WEIGHT: 180.7 LBS | HEIGHT: 66 IN | SYSTOLIC BLOOD PRESSURE: 110 MMHG | DIASTOLIC BLOOD PRESSURE: 60 MMHG | OXYGEN SATURATION: 94 %

## 2023-07-19 DIAGNOSIS — I34.0 NONRHEUMATIC MITRAL VALVE REGURGITATION: ICD-10-CM

## 2023-07-19 DIAGNOSIS — I48.0 PAF (PAROXYSMAL ATRIAL FIBRILLATION) (HCC): ICD-10-CM

## 2023-07-19 DIAGNOSIS — I35.1 NONRHEUMATIC AORTIC VALVE INSUFFICIENCY: ICD-10-CM

## 2023-07-19 DIAGNOSIS — I50.20 HFREF (HEART FAILURE WITH REDUCED EJECTION FRACTION) (HCC): Primary | ICD-10-CM

## 2023-07-19 DIAGNOSIS — I50.22 CHRONIC SYSTOLIC (CONGESTIVE) HEART FAILURE (HCC): ICD-10-CM

## 2023-07-19 DIAGNOSIS — I42.8 NICM (NONISCHEMIC CARDIOMYOPATHY) (HCC): ICD-10-CM

## 2023-07-19 PROBLEM — I38 VHD (VALVULAR HEART DISEASE): Status: RESOLVED | Noted: 2023-04-10 | Resolved: 2023-07-19

## 2023-07-19 PROCEDURE — 1123F ACP DISCUSS/DSCN MKR DOCD: CPT | Performed by: INTERNAL MEDICINE

## 2023-07-19 PROCEDURE — 93000 ELECTROCARDIOGRAM COMPLETE: CPT | Performed by: INTERNAL MEDICINE

## 2023-07-19 PROCEDURE — 99214 OFFICE O/P EST MOD 30 MIN: CPT | Performed by: INTERNAL MEDICINE

## 2023-07-19 NOTE — PROGRESS NOTES
Nares are clear & not bleeding. Moist mucosa. Normal lips formation. No external masses   NEURO: no tremors, full ROM x 4, EOMI. SKIN:  Warm, dry and intact. Normal turgor. EKG: Sinus rhythm, 65 bpm, nl axis, nonspecific ST - T wave changes. Right bundle branch block. Assessment:   Nonischemic cardiomyopathy as outlined above. No decompensation at this time. Repeat echo shows ejection fraction maintaining at 40%. Paroxysmal atrial fibrillation. maintaining sinus rhythm at this time. Moderate aortic regurgitation  Mild to moderate mitral regurgitation  Asthma  Prostate cancer  Bipolar      Recommendations:  I reviewed his home vitals and weights. I discussed with his wife and him the importance of following his daily weights. I recommended that if his weight gets to 174 or greater that he should take an extra afternoon dose of his Lasix on that day only. His beta-blocker had to be discontinued due to low heart rates and low blood pressures. We discussed all of his medications in great detail and discussed all of the above in great detail. Thank you for allowing me to participate in your patient's care. [address] [place] [address]  Interventional Cardiology    Note: This report was completed using computerized voice recognition software. Every effort has been made to ensure accuracy, however; and invert and computerized transcription errors may be present.

## 2023-08-01 ENCOUNTER — OFFICE VISIT (OUTPATIENT)
Dept: PRIMARY CARE CLINIC | Age: 78
End: 2023-08-01
Payer: MEDICARE

## 2023-08-01 VITALS
TEMPERATURE: 97.4 F | RESPIRATION RATE: 20 BRPM | SYSTOLIC BLOOD PRESSURE: 104 MMHG | HEIGHT: 66 IN | OXYGEN SATURATION: 99 % | BODY MASS INDEX: 28.45 KG/M2 | DIASTOLIC BLOOD PRESSURE: 68 MMHG | WEIGHT: 177 LBS | HEART RATE: 80 BPM

## 2023-08-01 DIAGNOSIS — J45.909 MODERATE ASTHMA WITHOUT COMPLICATION, UNSPECIFIED WHETHER PERSISTENT: Primary | ICD-10-CM

## 2023-08-01 DIAGNOSIS — F41.8 DEPRESSION WITH ANXIETY: ICD-10-CM

## 2023-08-01 PROCEDURE — 99213 OFFICE O/P EST LOW 20 MIN: CPT | Performed by: FAMILY MEDICINE

## 2023-08-01 PROCEDURE — 1123F ACP DISCUSS/DSCN MKR DOCD: CPT | Performed by: FAMILY MEDICINE

## 2023-08-01 RX ORDER — CLONAZEPAM 0.5 MG/1
TABLET ORAL
Qty: 270 TABLET | Refills: 0 | Status: SHIPPED | OUTPATIENT
Start: 2023-08-01 | End: 2023-11-23

## 2023-08-01 ASSESSMENT — ENCOUNTER SYMPTOMS
DIFFICULTY BREATHING: 0
SPUTUM PRODUCTION: 0
COUGH: 0
WHEEZING: 0
CHEST TIGHTNESS: 0
SHORTNESS OF BREATH: 1

## 2023-08-01 NOTE — PROGRESS NOTES
Effort: Pulmonary effort is normal.      Breath sounds: Normal breath sounds. Musculoskeletal:      Cervical back: Neck supple. Lymphadenopathy:      Cervical: No cervical adenopathy. Skin:     General: Skin is warm and dry. Neurological:      Mental Status: He is alert and oriented to person, place, and time. Psychiatric:         Mood and Affect: Mood normal.       ASSESSMENT AND PLAN:    Ruben Artis was seen today for asthma and other. Diagnoses and all orders for this visit:    Moderate asthma without complication, unspecified whether persistent    Depression with anxiety  -     clonazePAM (KLONOPIN) 0.5 MG tablet; TAKE 1 TABLET BY MOUTH THREE TIMES DAILY AS NEEDED FOR ANXIETY    Other orders  -     neomycin-polymyxin-hydrocortisone (CORTISPORIN) 3.5-99981-4 otic solution; Place 4 drops in ear(s) 3 times daily for 10 days Instill into painful Ear    - continue Spiriva and Symbicort   - see psych at [facility] today  - recommend counseling. Return in about 3 months (around 11/1/2023) for or for acute problem.     Too Carney, DO

## 2023-08-28 NOTE — ED PROVIDER NOTES
[facility]        Pt Name: Maykel Espitia  MRN: [id number]  [address] [facility] 1945  Date of evaluation: 8/28/2023  Provider: Shauna Gruber DO  PCP: Pilar Herrera DO  Note Started: 11:03 AM EDT 8/28/23    CHIEF COMPLAINT       Chief Complaint   Patient presents with    Cardiac Arrest       HISTORY OF PRESENT ILLNESS: 1 or more Elements   History From: EMS        Maykel Espitia is a 66 y.o. male who presents to the emergency department for witnessed cardiac arrest.  Patient presents in cardiac arrest.  Per EMS he was reportedly down for approximately 40 minutes of CPR prior to arrival.  EMS states patient initially had a witnessed cardiac arrest and a one-point was defibrillated for V-fib and given 300 mg of amiodarone. ROSC was obtained momentarily followed by arrest.  Patient noted to be in PEA on the monitor. ACLS was continued on arrival.    Nursing Notes were all reviewed and agreed with or any disagreements were addressed in the HPI. REVIEW OF EXTERNAL NOTE :       Previous echo 3/28/2023 reviewed. EF 40%. REVIEW OF SYSTEMS :           Positives and Pertinent negatives as per HPI.      SURGICAL HISTORY     Past Surgical History:   Procedure Laterality Date    CHOLECYSTECTOMY      COLONOSCOPY  12/21/2004    HERNIA REPAIR      PILONIDAL CYST EXCISION      TONSILLECTOMY         CURRENTMEDICATIONS       Previous Medications    ACYCLOVIR (ZOVIRAX) 200 MG CAPSULE    Take 1 capsule by mouth daily    ALBUTEROL SULFATE (PROAIR RESPICLICK) 784 (90 BASE) MCG/ACT AEROSOL POWDER INHALATION    Inhale 2 puffs into the lungs every 4 hours as needed for Shortness of Breath or Wheezing    APIXABAN (ELIQUIS) 5 MG TABS TABLET    Take 1 tablet by mouth 2 times daily    CHOLECALCIFEROL (VITAMIN D3) 50 MCG (2000 UT) CAPS    Take 1 capsule by mouth daily    CLONAZEPAM (KLONOPIN) 0.5 MG TABLET    TAKE 1 TABLET BY

## 2023-08-28 NOTE — TELEPHONE ENCOUNTER
Patient's wife called office stating that they think patient is back in AFIB. Patient having symptoms of sob. I offered to have patient to come in for a lab EKG to confirm if he was in AFIB today at 1:15 pm and patients wife didn't want to wait that long. She wants to take him to the ER.

## 2023-08-28 NOTE — CODE DOCUMENTATION
Problem: At Risk for Falls  Goal: # Patient does not fall  Outcome: Outcome Not Met, Continue to Monitor  Goal: # Takes action to control fall-related risks  Outcome: Outcome Not Met, Continue to Monitor  Goal: # Verbalizes understanding of fall risk/precautions  Description: Document education using the patient education activity  Outcome: Outcome Not Met, Continue to Monitor     Problem: Cardiac Rhythm Disturbances with or without Devices  Goal: Hemodynamic stability achieved/maintained  Outcome: Outcome Not Met, Continue to Monitor     Problem: Diabetes  Goal: Glycemic balance achieved/maintained  Description: Goal is to maintain blood sugar within range with no episodes of hypoglycemia  Outcome: Outcome Not Met, Continue to Monitor     Problem: Breathing Pattern Ineffective  Goal: Air exchange is effective, demonstrated by Sp02 sat of greater then or = 92% (or as ordered)  Outcome: Outcome Not Met, Continue to Monitor      300mL NS last hour of life.

## 2023-08-28 NOTE — CODE DOCUMENTATION
EMS reports giving 5 rounds of epi, 300 of amnio, 50 of bicarb, and shocked once for v fib. 1000 code started for EMS. Intubated at 1010.

## 2023-08-28 NOTE — ED NOTES
250 [facility] at bedside to  patient . Patients belongings taken home with family.      Lurdes Carpenter RN  08/28/23 [id number]

## 2023-08-28 NOTE — ED NOTES
Mark Schultz from [facility] at bedside to speak with family , wife states she does not want patient to follow with [facility] would like patient picked up by  home at this time      Bina Rai  [phone]

## 2024-01-01 NOTE — CARE COORDINATION
Remote Patient Monitoring Note  Date/Time:  5/11/2023 9:15 AM  Patient Current Location: Home: [place] [address]  LPN noted red alert received for weight increase (5 lbs x 7 days). Background: High Blood-Pressure, CHF, Asthma  Clinical Interventions: Reviewed and followed up on alerts and treatments-   Upon chart review Pt appetite improving and weights are returning to Pre hospitalization numbers. Pt's weight currently stable at 166.4 lbs. Plan/Follow Up: Will continue to review, monitor and address alerts with follow up based on severity of symptoms and risk factors.
Statement Selected

## 2025-08-20 ENCOUNTER — CARE COORDINATION (OUTPATIENT)
Dept: CARE COORDINATION | Age: 80
End: 2025-08-20